# Patient Record
Sex: FEMALE | NOT HISPANIC OR LATINO
[De-identification: names, ages, dates, MRNs, and addresses within clinical notes are randomized per-mention and may not be internally consistent; named-entity substitution may affect disease eponyms.]

---

## 2017-07-22 ENCOUNTER — HEALTH MAINTENANCE LETTER (OUTPATIENT)
Age: 32
End: 2017-07-22

## 2024-01-22 ENCOUNTER — DOCUMENTATION ONLY (OUTPATIENT)
Dept: FAMILY MEDICINE | Facility: CLINIC | Age: 39
End: 2024-01-22

## 2024-01-25 ENCOUNTER — OFFICE VISIT (OUTPATIENT)
Dept: FAMILY MEDICINE | Facility: CLINIC | Age: 39
End: 2024-01-25
Payer: COMMERCIAL

## 2024-01-25 VITALS
HEIGHT: 65 IN | DIASTOLIC BLOOD PRESSURE: 70 MMHG | SYSTOLIC BLOOD PRESSURE: 120 MMHG | WEIGHT: 135 LBS | HEART RATE: 85 BPM | OXYGEN SATURATION: 100 % | BODY MASS INDEX: 22.49 KG/M2 | RESPIRATION RATE: 20 BRPM

## 2024-01-25 DIAGNOSIS — N89.8 VAGINAL DISCHARGE: ICD-10-CM

## 2024-01-25 DIAGNOSIS — Z13.9 SCREENING FOR CONDITION: ICD-10-CM

## 2024-01-25 DIAGNOSIS — Z32.00 ENCOUNTER FOR CONFIRMATION OF PREGNANCY TEST RESULT WITH PHYSICAL EXAMINATION: Primary | ICD-10-CM

## 2024-01-25 LAB
CLUE CELLS: ABNORMAL
HCG UR QL: POSITIVE
TRICHOMONAS, WET PREP: ABNORMAL
WBC'S/HIGH POWER FIELD, WET PREP: ABNORMAL
YEAST, WET PREP: ABNORMAL

## 2024-01-25 PROCEDURE — 81025 URINE PREGNANCY TEST: CPT

## 2024-01-25 PROCEDURE — 87210 SMEAR WET MOUNT SALINE/INK: CPT

## 2024-01-25 PROCEDURE — 99203 OFFICE O/P NEW LOW 30 MIN: CPT | Mod: GC

## 2024-01-25 RX ORDER — PYRIDOXINE HCL (VITAMIN B6) 25 MG
25 TABLET ORAL 4 TIMES DAILY PRN
Qty: 30 TABLET | Refills: 3 | Status: SHIPPED | OUTPATIENT
Start: 2024-01-25 | End: 2024-07-15

## 2024-01-25 RX ORDER — PRENATAL VIT/IRON FUM/FOLIC AC 27MG-0.8MG
1 TABLET ORAL DAILY
Qty: 90 TABLET | Refills: 3 | Status: SHIPPED | OUTPATIENT
Start: 2024-01-25 | End: 2024-07-15

## 2024-01-25 NOTE — PROGRESS NOTES
Assessment & Plan     (Z32.00) Encounter for confirmation of pregnancy test result with physical examination  (primary encounter diagnosis)  Comment: Pregnancy test positive, desires pregnancy. Father known and wishes to be involved.  with one spontaneous  in 2nd gestation. First baby vaginal, then twins born via horizontal c section at 32 weeks who stayed in the NICU for a month, and then an uncomplicated . LMP about 12/10/24. Gestation about 6.5 weeks. Will get 8 week dating US. Orders provided for prenatal vitamin and vitamin B6  Plan:   - HCG qualitative urine,  - Prenatal Vit-Fe Fumarate-FA (PRENATAL MULTIVITAMIN W/IRON) 27-0.8 MG tablet,   - US OB <14 WKS SINGLE OR FIRST GESTATION,   - pyridOXINE (VITAMIN B6) 25 MG tablet            (N89.8) Vaginal discharge  Comment: Patient with a couple weeks of stable white discharge. Not painful, irritating, malodorous, or itchy. Not changing over this time period. Wet prep negative.   Plan:    - Wet prep negative   - Continue to monitor   - Follow up if it becomes more bothersome or painful       No follow-ups on file.    Sharon Ricardo is a 38 year old, presenting for the following health issues:  Establish Care (And ob care ), STD (Discharge ), Patient Request for Note/Letter (Lft work early for appt), and OTHER (Work restricted )        2024     3:55 PM   Additional Questions   Roomed by torres CARRERO     Lives nearby. Work as  at [employer]. Living with twins 13 and son at 10. 18 year old as well lives with father. Cat litter.     3 positive tests started like 10 days ago.     LMP: around 12/10/23. Male partner and interested in caring for daughter.     Prenatal multivitamin. No regular medications.     Vaginal, twin , vaginal. Twin twins premature - 32 weeks. NICU for about a month. 1 misscarriage.     Lots of nausea throughout preganncy until 6 months.         Work note for leaving work at 1 PM today    White  "discharge - Couple weeks, stable, not itching or irritating. No pain with urination ro sex.         Review of Systems  Constitutional, HEENT, cardiovascular, pulmonary, gi and gu systems are negative, except as otherwise noted.      Objective    /70   Pulse 85   Resp 20   Ht 1.651 m (5' 5\")   Wt 61.2 kg (135 lb)   LMP 12/18/2023 (Approximate)   SpO2 100%   BMI 22.47 kg/m    Body mass index is 22.47 kg/m .  Physical Exam   GENERAL: Healthy, alert and no distress  EYES: Eyes grossly normal to inspection.  No discharge or erythema, or obvious scleral/conjunctival abnormalities.  RESP:  Lungs clear throughout. No wheeze or crackles.   CV: Heart RRR. No murmur  NEURO: Cranial nerves grossly intact.  Mentation and speech appropriate for age.  PSYCH: Mentation appears normal, affect normal/bright, judgement and insight intact, normal speech and appearance well-groomed.      Positive pregnancy. Negative wet prep.         Signed Electronically by: Santos Herndon MD    "

## 2024-01-29 NOTE — PROGRESS NOTES
Faculty Supervision of Residents   I have examined this patient and the medical care has been evaluated and discussed with the resident. See resident note outlining our discussion.    Denise Epstein MD

## 2024-02-02 NOTE — PROGRESS NOTES
OB intake completed via phone on 2024 with patient. Haleigh is a 39 yo, American female, , with history of one miscarriage, first trimester, in . All pregnancies were complicated by hyperemesis throughout most of pregnancy. First and third pregnancies were delivered via normal, spontaneous, vaginal deliveries. Second pregnancy was twin gestation, delivered via  at 32 weeks gestation, went into active labor.     Current pregnancy was unplanned, desired by only Haleigh. FOB is not in agreement to keep the pregnancy and is no longer in a relationship with patient. LMP reported as 2023. Haleigh will plan to request a paternity test be done once baby is born. Patient endorses use of THC, last use 2-3 weeks ago, social use. Lyubov EL

## 2024-02-08 ENCOUNTER — ANCILLARY PROCEDURE (OUTPATIENT)
Dept: ULTRASOUND IMAGING | Facility: CLINIC | Age: 39
End: 2024-02-08
Payer: COMMERCIAL

## 2024-02-08 ENCOUNTER — OFFICE VISIT (OUTPATIENT)
Dept: FAMILY MEDICINE | Facility: CLINIC | Age: 39
End: 2024-02-08
Payer: COMMERCIAL

## 2024-02-08 VITALS
BODY MASS INDEX: 23.17 KG/M2 | OXYGEN SATURATION: 99 % | RESPIRATION RATE: 20 BRPM | TEMPERATURE: 99.1 F | SYSTOLIC BLOOD PRESSURE: 106 MMHG | HEART RATE: 70 BPM | HEIGHT: 64 IN | DIASTOLIC BLOOD PRESSURE: 68 MMHG

## 2024-02-08 DIAGNOSIS — Z30.09 ENCOUNTER FOR COUNSELING REGARDING CONTRACEPTION: ICD-10-CM

## 2024-02-08 DIAGNOSIS — Z13.9 SCREENING FOR CONDITION: ICD-10-CM

## 2024-02-08 DIAGNOSIS — O20.0 THREATENED MISCARRIAGE IN EARLY PREGNANCY: Primary | ICD-10-CM

## 2024-02-08 PROCEDURE — 76817 TRANSVAGINAL US OBSTETRIC: CPT | Mod: TC | Performed by: RADIOLOGY

## 2024-02-08 PROCEDURE — 99214 OFFICE O/P EST MOD 30 MIN: CPT | Performed by: FAMILY MEDICINE

## 2024-02-08 PROCEDURE — 76801 OB US < 14 WKS SINGLE FETUS: CPT | Mod: TC | Performed by: RADIOLOGY

## 2024-02-08 NOTE — PROGRESS NOTES
"  Assessment & Plan       Threatened miscarriage in early pregnancy  Discussed most likely SAB in process given the higher risk sexual encounter matches later dates and aren't consistent with today's ultrasound as well as that it would be difficult for her ot have had + pregnancy test 1/16 after intercourse 1/10 and she does not usually miss menses. She is likely not planning to continue the pregnancy either way, but wants to know if it is a viable pregnancy for sure and the dating prior to decision. We discussed serial HCGs, but she preferred to get a repeat US next week. She would like to restart depo when this is done. Follow-up in 2 weeks with provider.  - US OB <14 Weeks w Transvaginal Single; Future      I spent a total of 35 minutes on the day of the visit.   Time spent by me doing chart review, history and exam, documentation and further activities per the note            No follow-ups on file.    Sharon Ricardo is a 38 year old, presenting for the following health issues:  Follow Up (U/S measurement)    HPI   Presents for concern for miscarriage. Had first dating US today. By estimated LMP would be 8-9+ weeks gestation. US had gestational sac and no cardiac activity.     LMP was at the beginning of December sometime.    Last time had unprotected intercourse was before Christmas, positive pregnancy test on Jan 16 after her period was a couple weeks late. She did also have sex with a different partner using a condom Travis 10. Typically her cycle is very regular with menses at the beginning of each month. She had one day of nausea a few days ago, some breast tenderness. No bleeding or cramping.                   Objective    /68   Pulse 70   Temp 99.1  F (37.3  C)   Resp 20   Ht 1.626 m (5' 4\")   LMP 12/18/2023 (Approximate)   SpO2 99%   BMI 23.17 kg/m    Body mass index is 23.17 kg/m .  Physical Exam               Signed Electronically by: Jaz Manzo MD    "

## 2024-02-22 ENCOUNTER — OFFICE VISIT (OUTPATIENT)
Dept: FAMILY MEDICINE | Facility: CLINIC | Age: 39
End: 2024-02-22
Payer: COMMERCIAL

## 2024-02-22 VITALS
OXYGEN SATURATION: 95 % | TEMPERATURE: 99.3 F | BODY MASS INDEX: 22.66 KG/M2 | HEART RATE: 93 BPM | WEIGHT: 136 LBS | DIASTOLIC BLOOD PRESSURE: 72 MMHG | SYSTOLIC BLOOD PRESSURE: 134 MMHG | HEIGHT: 65 IN

## 2024-02-22 DIAGNOSIS — Z12.4 CERVICAL CANCER SCREENING: ICD-10-CM

## 2024-02-22 DIAGNOSIS — O02.1 MISSED ABORTION: Primary | ICD-10-CM

## 2024-02-22 DIAGNOSIS — Z11.4 SCREENING FOR HIV (HUMAN IMMUNODEFICIENCY VIRUS): ICD-10-CM

## 2024-02-22 DIAGNOSIS — Z11.59 NEED FOR HEPATITIS C SCREENING TEST: ICD-10-CM

## 2024-02-22 PROCEDURE — S0190 MIFEPRISTONE, ORAL, 200 MG: HCPCS | Performed by: FAMILY MEDICINE

## 2024-02-22 PROCEDURE — 99214 OFFICE O/P EST MOD 30 MIN: CPT | Performed by: FAMILY MEDICINE

## 2024-02-22 RX ORDER — MISOPROSTOL 200 UG/1
800 TABLET ORAL ONCE
Qty: 4 TABLET | Refills: 1 | Status: CANCELLED | OUTPATIENT
Start: 2024-02-22 | End: 2024-02-22

## 2024-02-22 RX ORDER — MIFEPRISTONE 200 MG/1
200 TABLET ORAL ONCE
Status: COMPLETED | OUTPATIENT
Start: 2024-02-22 | End: 2024-02-22

## 2024-02-22 RX ORDER — ONDANSETRON 4 MG/1
4 TABLET, FILM COATED ORAL EVERY 6 HOURS PRN
Qty: 10 TABLET | Refills: 0 | Status: SHIPPED | OUTPATIENT
Start: 2024-02-22 | End: 2024-02-22 | Stop reason: SINTOL

## 2024-02-22 RX ORDER — IBUPROFEN 800 MG/1
800 TABLET, FILM COATED ORAL EVERY 8 HOURS PRN
Qty: 21 TABLET | Refills: 0 | Status: SHIPPED | OUTPATIENT
Start: 2024-02-22 | End: 2024-02-23

## 2024-02-22 RX ORDER — IBUPROFEN 800 MG/1
800 TABLET, FILM COATED ORAL EVERY 8 HOURS PRN
Qty: 21 TABLET | Refills: 0 | Status: CANCELLED | OUTPATIENT
Start: 2024-02-22

## 2024-02-22 RX ORDER — MISOPROSTOL 200 UG/1
800 TABLET ORAL ONCE
Qty: 4 TABLET | Refills: 1 | Status: SHIPPED | OUTPATIENT
Start: 2024-02-22 | End: 2024-02-22

## 2024-02-22 RX ADMIN — MIFEPRISTONE 200 MG: 200 TABLET ORAL at 16:06

## 2024-02-22 NOTE — PATIENT INSTRUCTIONS
MISCARRIAGE MANAGEMENT USING MEDICATIONS     HOW DOES IT WORK?   Pills called mifepristone and misoprostol can help an early pregnancy loss to end faster.     HOW WELL DOES THE MEDICATION WORK?   These pills work 84% of the time to complete the miscarriage within two days and 89% of the time within a week. If you can t get mifepristone, misoprostol works 67% of the time within two days and 84% of the time within a week when used alone.     IS IT SAFE?   Yes, pills for pregnancy loss are safe. Using pills does not lower your chance of getting pregnant again.     WHAT DO I DO?   You took one tablet of 200 mg mifepristone today during your visit.  About 24 hours after taking the mifepristone, use the misoprostol at home. Choose a time when you have had a good meal and plenty of rest.   Swallow ibuprofen (600 mg total) one hour before using the misoprostol. This will help with side effects.   Vaginal Use of Misoprostol  Wash your hands and lie down. Push the four misoprostol tablets one at a time up into the vagina as far as you can using your finger. It doesn t matter where in the vagina you put the pills. Each misoprostol pill contains 200 micrograms.   Keep lying down for 30 minutes.   After 30 minutes, you can move around as usual. If the tablets come out after 30 minutes, it is OK. Your body has absorbed the medication.        Buccal Use of Misoprostol  Place 2 tablets of misoprostol in each cheek (between your cheek and your gums)  Let these tablets dissolve, whatever has not dissolved within 30 minutes can be swallowed with a large glass of water  If you do not start to bleed within 24 hours give yourself a second dose of misoprostol. Do this by repeating step 4 or 5 above.     WHAT HAPPENS NEXT?  Cramps and bleeding with clots are an expected part of this process. The cramps and bleeding may be stronger than your normal period. The cramps usually start 2 to 4 hours after you use the misoprostol pills and may  last 3 to 5 hours. You may start to have vaginal bleeding before you use the pills I have given you. This heavy bleeding is not risky. It means the pills are working. The bleeding often lasts 1 to 2 weeks, and may stop and start a few times.   Nausea, diarrhea, or chills: These symptoms should get better a few hours after using the pills and should not last longer than 24 hours.   You need to have an appointment in the clinic in 1-2 weeks. This was made for you today. At this visit we will ensure that the treatment was effective. If it was not effective, we will talk about next steps.        WHAT CAN I TAKE FOR PAIN, NAUSEA, DIARRHEA?   Pain:   Take ibuprofen (Motrin or Advil) 800 mg every 8 hours as needed for pain. Take this with food to avoid an upset stomach.   You may receive a stronger, narcotic pain medicine. You can use this if you still have pain after taking ibuprofen. Follow the directions on the label.   Comfort: A hot pack may help with cramps. You can also drink some hot tea. Rest in a soothing place.   Nausea  Take ondansetron or promethazine as needed for nausea and vomiting as needed for nausea and vomiting.   Diarrhea  Take Loperamide as needed for diarrhea. Take 2 capsules after the first loose bowel movement. Can take 1 capsule after each additional loose stool. Do not take more than 8 capsules in a day.    WHAT IF IT TAKES TOO LONG OR DOESN T WORK?   If it doesn t work or you feel it is taking too long, please call the clinic and we can discuss the next steps.    WHEN SHOULD I CALL OR RETURN TO MY HEALTH CARE PROVIDER?   Your bleeding soaks through more than 2 maxi pads per hour for 2 hours in a row.  You have a fever on an oral thermometer of 100.4 degrees Fahrenheit   You have severe stomach area (abdominal) pain   You continue to feel sick 24 hours after taking the misoprostol, this includes stomach pain or discomfort, weakness, nausea, vomiting, or diarrhea    The clinic phone is answered 24  hours per day. If it is after clinic hours, leave a message with the answering service and a physician will call you back. Please call if you have any questions, think something is going wrong, or think you have an emergency. No question is too small. If you do not receive a call back within an hour, go to the nearest emergency room.     HOW SOON CAN I GET PREGNANT AGAIN?   You can get pregnant soon after the pregnancy loss ends. If you want to try again, take a prenatal vitamin daily. If you are not ready to be pregnant at this time, please see your clinician for your birth control options.     FEELINGS AFTER EARLY PREGNANCY LOSS   Most women feel mixed emotions after a miscarriage. It often depends upon how much time you had to adjust to the news of the pregnancy loss. These up and down feelings are partly from the changes in hormones. Please understand that nothing you did caused the pregnancy loss. It is NOT caused by stress, sports, food, or sex. Feeling emotional at this time is normal. If you think your emotions are not what they should be, please talk to us.

## 2024-02-22 NOTE — PROGRESS NOTES
"  Confirmed Early Pregnancy Loss - Outpatient Visit    Subjective:  Haleigh Caraballo is a 38 year old female coming today for management of likely early pregnancy loss. She has noticed the following:  - loss of pregnancy symptoms  - lower abdominal pain but no bleeding  Patient diagnosed with early pregnancy loss at the following time/location: [facility] earlier today confirmed via ultrasound, with fetal pole and no cardiac activity that is unchanged since 2/9/24    ROS: All other review of symptoms was otherwise negative except as noted in HPI    Dating:  LMP: Patient's last menstrual period was 12/18/2023 (approximate).  Ultrasound results:   vaginal  Intrauterine gestational sac seen: no  Gestational sac summary: fetal pole seen, CRL: 0.5 cm, EGA: 6 weeks + 2 days  Fetal/Embryonic cardiac activity: absent  Adnexa: Not evaluated    Serum beta hCG: was assessed x 1 2/9 and was consistent with presumed GA at that time  Hemoglobin: NA  RH Status (if >8wks GA): Not required      Objective:  /72   Pulse 93   Temp 99.3  F (37.4  C) (Tympanic)   Ht 1.648 m (5' 4.88\")   Wt 61.7 kg (136 lb)   LMP 12/18/2023 (Approximate)   SpO2 95%   Breastfeeding Unknown   BMI 22.71 kg/m    Vitals were reviewed and were normal    Gen: well-appearing, cooperative, well-groomed      Assessment and Plan:  First-trimester pregnancy loss - this based on meeting [place] ultrasound criteria     We reviewed the patient's options at length including the risks and benefits or expectant management, medical management using misoprostol alone, mifepristone and misoprostol, and aspiration procedure with local anesthesia and suction evacuation. Patient IS vitally stable and IS appropriate to manage as an outpatient.    The patient has elected and is appropriate for medical management with mifepristone and misoprostol.     Prior to the administration/prescribing of mifepristone, the patient received the medication guide and " signed the patient agreement.      Mifepristone administered in clinic. Patient plans to take misoprostol by buccal route. A refill was provided of misoprostol as patient has had minimal symptoms.    Medication Management  - confirmed patient is within gestational age limit (11w6d or less)  - obtain serum hCG (if applicable)  - obtain hemoglobin and type and screen (if patent is >8wk GA and if not already known, or if not performed within the window of onset of bleeding or diagnosis of EPL)  - reviewed expected and serious side effects from mifepristone  - reviewed how to take misoprostol and expected side effects  - reviewed that mifepristone/misoprostol may not be effective and patient may require surgical management of miscarriage.   - confirmed and addressed any drug-drug interactions with patient's concurrent medicines, including vitamins and herbal supplements  - provided prescription of ibuprofen for cramping, loperamide for diarrhea and a medicine for nausea  - call or return to clinic in 1-2 weeks if no bleeding or obvious passage of POC's has occurred  - chart routed to RN team who will follow up with patient via telephone in 1-2 days (or next business day) after clinic visit to assess patient     33 minutes spent on day of visit reviewing the chart, in documentation, and face to face counseling and evaluation with the patient.     Jaz Manzo MD

## 2024-02-22 NOTE — LETTER
RETURN TO WORK/SCHOOL FORM    2/22/2024    Re: Haleigh Caraballo  1985      To Whom It May Concern:     Haleigh Caraballo was seen in clinic today.  She may return to work without restrictions on 2/28/24.  Please excuse her from work until that time.             Jaz Manzo MD  2/22/2024 3:18 PM

## 2024-02-23 RX ORDER — IBUPROFEN 800 MG/1
800 TABLET, FILM COATED ORAL EVERY 8 HOURS PRN
Qty: 21 TABLET | Refills: 1 | Status: SHIPPED | OUTPATIENT
Start: 2024-02-23

## 2024-02-23 RX ORDER — ONDANSETRON 4 MG/1
4 TABLET, FILM COATED ORAL EVERY 6 HOURS PRN
Qty: 10 TABLET | Refills: 0 | Status: SHIPPED | OUTPATIENT
Start: 2024-02-23 | End: 2024-03-07

## 2024-02-23 RX ORDER — ONDANSETRON 4 MG/1
4 TABLET, FILM COATED ORAL EVERY 6 HOURS PRN
Qty: 10 TABLET | Refills: 0 | OUTPATIENT
Start: 2024-02-23 | End: 2024-08-08

## 2024-02-26 DIAGNOSIS — N89.8 VAGINAL DISCHARGE: Primary | ICD-10-CM

## 2024-02-26 RX ORDER — METRONIDAZOLE 7.5 MG/G
1 GEL VAGINAL DAILY
Qty: 35 G | Refills: 0 | Status: SHIPPED | OUTPATIENT
Start: 2024-02-26 | End: 2024-03-04

## 2024-02-26 NOTE — PROGRESS NOTES
BV diagnosed in hospital, desired vaginal gel treatment and sent for post SAB if needed.    Jaz Manzo MD on 2/26/2024 at 12:28 PM

## 2024-02-28 ENCOUNTER — TELEPHONE (OUTPATIENT)
Dept: FAMILY MEDICINE | Facility: CLINIC | Age: 39
End: 2024-02-28
Payer: COMMERCIAL

## 2024-02-28 NOTE — TELEPHONE ENCOUNTER
Writer did not receive note to follow up with patient, chart not routed to writer. Writer performing follow up on OB panel of patients,  had noticed patient was in on 2/22/2024. After review of office visit notes, writer called patient for follow up.  Per Haleigh, given one tablet of Mifeprisone 200mg while in clinic, went to pharmacy and picked up Misoprostol 200mg tablets x 4 tablets. Administered Misoprostol as prescribed. Following day, Friday, 2/23/2024 around 6pm, had minor abdominal cramping then noticed what felt like a large amount of gush from vagina while was on the toilet. Haleigh looked in toilet but was not able to identify if there were blood clots, tissue or products of conception.  Thereafter had normal, vaginal bleeding similar to menses cycle. Denies any further abdominal pain/cramping, no back pain or fever. Otherwise feel ok. Today, is at work, only experiencing light spotting but still using regular pad.  Per Dr Tompkins's recommendation for follow up in 1-2 weeks, was able to schedule with patient a follow up appointment for 3/7/2024 with Dr Gonzalez. Dr Tompkins was not available, Haleigh is fine with seeing another provider. Encounter routed to inform Dr Tompkins of the updated information above. Thank you. Lyubov EL

## 2024-03-07 ENCOUNTER — OFFICE VISIT (OUTPATIENT)
Dept: FAMILY MEDICINE | Facility: CLINIC | Age: 39
End: 2024-03-07
Payer: COMMERCIAL

## 2024-03-07 VITALS
OXYGEN SATURATION: 100 % | RESPIRATION RATE: 18 BRPM | BODY MASS INDEX: 22.2 KG/M2 | HEART RATE: 82 BPM | SYSTOLIC BLOOD PRESSURE: 111 MMHG | TEMPERATURE: 98.5 F | HEIGHT: 66 IN | DIASTOLIC BLOOD PRESSURE: 74 MMHG | WEIGHT: 138.12 LBS

## 2024-03-07 DIAGNOSIS — S39.94XA INJURY OF VULVA, INITIAL ENCOUNTER: ICD-10-CM

## 2024-03-07 DIAGNOSIS — O02.1 MISSED ABORTION: Primary | ICD-10-CM

## 2024-03-07 PROBLEM — H52.13 MYOPIC ASTIGMATISM OF BOTH EYES: Status: ACTIVE | Noted: 2019-03-11

## 2024-03-07 PROBLEM — A59.9 TRICHOMONIASIS: Status: ACTIVE | Noted: 2023-05-18

## 2024-03-07 PROBLEM — H52.203 MYOPIC ASTIGMATISM OF BOTH EYES: Status: ACTIVE | Noted: 2019-03-11

## 2024-03-07 PROCEDURE — 36415 COLL VENOUS BLD VENIPUNCTURE: CPT

## 2024-03-07 PROCEDURE — 99214 OFFICE O/P EST MOD 30 MIN: CPT | Mod: GC

## 2024-03-07 PROCEDURE — 84702 CHORIONIC GONADOTROPIN TEST: CPT

## 2024-03-07 RX ORDER — MISOPROSTOL 200 UG/1
TABLET ORAL
COMMUNITY
Start: 2024-02-23 | End: 2024-07-15

## 2024-03-07 NOTE — PROGRESS NOTES
Preceptor Attestation:  Patient's case reviewed and discussed with Nancy Gonzalez MD resident and I evaluated the patient. I agree with written assessment and plan of care.  Supervising Physician:  MICHAEL SYKES MD  [facility]

## 2024-03-07 NOTE — PROGRESS NOTES
"  Assessment & Plan     Missed   Treated for spontaneous miscarriage on . Passed clots and tissue two days afterwards. Mild bleeding for about a week after but this has now subsided. Will get a hcg quant to assure complete clearance of tissue.   - hCG Quantitative Pregnancy; Future  - hCG Quantitative Pregnancy    Injury of vulva, initial encounter  Left labial injury from shaving. Two small <1 cm lacerations in process of healing. No other lesions noted. Discussed likely healing in the next week. If not improving within the next week will return for further evaluation.       Sharon Ricardo is a 38 year old, presenting for the following health issues:  RECHECK (F/u )    HPI   Seen on  and diagnosed with first trimester pregnancy loss and prescribed mifepristone/misoprostol. Was called by our RN team and patient believed she passed clots on . Hadn't had any bleeding since that time.  with one spontaneous  in 2nd gestation. First baby vaginal, then twins born via horizontal c section at 32 weeks who stayed in the NICU for a month, and then an uncomplicated .     Since that time:  Bleeding or cramping: minor bleeding from cut but no period bleeding or cramping  Desire for more children: yes  Birth control: not interested    Bump on vagina -- shaving. Last week. Never had it before. Painful to touch. No fevers or chills. Wonders about razor bump or injury. Currently sexually active.           Objective    /74 (BP Location: Right arm, Patient Position: Sitting, Cuff Size: Adult Regular)   Pulse 82   Temp 98.5  F (36.9  C)   Resp 18   Ht 1.665 m (5' 5.55\")   Wt 62.7 kg (138 lb 1.9 oz)   LMP 2023 (Approximate)   SpO2 100%   BMI 22.60 kg/m    Body mass index is 22.6 kg/m .  Physical Exam     GENERAL: Healthy, alert and no distress  EYES: Eyes grossly normal to inspection.  No discharge or erythema, or obvious scleral/conjunctival abnormalities.  HENT: Normal " cephalic/atraumatic.  External ears, nose and mouth without ulcers or lesions.  No nasal drainage visible.  RESP: No audible wheeze, cough, or visible cyanosis.  No visible retractions or increased work of breathing.    MS: No gross musculoskeletal defects noted.  Normal range of motion.  No visible edema.  : two small lacerations to left lower labia in process of healing. No active bleeding. No other bumps or lesions noted.   NEURO: Cranial nerves grossly intact.  Mentation and speech appropriate for age.  PSYCH: Mentation appears normal, affect normal/bright, judgement and insight intact, normal speech and appearance well-groomed.            Signed Electronically by: Nancy Gonzalez MD

## 2024-03-08 LAB — HCG INTACT+B SERPL-ACNC: 146 MIU/ML

## 2024-05-20 ENCOUNTER — OFFICE VISIT (OUTPATIENT)
Dept: FAMILY MEDICINE | Facility: CLINIC | Age: 39
End: 2024-05-20
Payer: COMMERCIAL

## 2024-05-20 VITALS
TEMPERATURE: 98 F | BODY MASS INDEX: 21.83 KG/M2 | WEIGHT: 131 LBS | OXYGEN SATURATION: 98 % | RESPIRATION RATE: 18 BRPM | HEART RATE: 71 BPM | HEIGHT: 65 IN | SYSTOLIC BLOOD PRESSURE: 110 MMHG | DIASTOLIC BLOOD PRESSURE: 75 MMHG

## 2024-05-20 DIAGNOSIS — N89.8 VAGINAL DISCHARGE: Primary | ICD-10-CM

## 2024-05-20 LAB
CLUE CELLS: ABNORMAL
TRICHOMONAS, WET PREP: ABNORMAL
WBC'S/HIGH POWER FIELD, WET PREP: ABNORMAL
YEAST, WET PREP: ABNORMAL

## 2024-05-20 PROCEDURE — 87491 CHLMYD TRACH DNA AMP PROBE: CPT

## 2024-05-20 PROCEDURE — 87210 SMEAR WET MOUNT SALINE/INK: CPT

## 2024-05-20 PROCEDURE — 87591 N.GONORRHOEAE DNA AMP PROB: CPT

## 2024-05-20 PROCEDURE — 99213 OFFICE O/P EST LOW 20 MIN: CPT | Mod: GC

## 2024-05-20 NOTE — PROGRESS NOTES
Preceptor Attestation:  Patient's case reviewed and discussed with the resident, Elizabeth Montoya MD, and I personally evaluated the patient. I agree with written assessment and plan of care.    Supervising Physician:  Cheyenne Soria MD   [facility]

## 2024-05-20 NOTE — PROGRESS NOTES
"  Assessment & Plan     Vaginal discharge  Patient has had a few episodes of white thick vaginal discharge in the past two weeks, now resolved. No other symptoms. No new sexual partners. Wet prep negative. G/C in process. Likely physiologic discharge.   - Wet prep  - Gonorrhea/chlamydia     No follow-ups on file.    Sharon Ricardo is a 38 year old, presenting for the following health issues:  Vaginal Problem (Discharge is not concerned about other STD's)        5/20/2024     3:36 PM   Additional Questions   Roomed by Ety   Accompanied by Self         5/20/2024    Information    services provided? No     HPI     White vaginal discharge (thick, creamy) a couple times in the last 2 weeks.   No itchiness, pain, burning.   No pain with intercourse.   No fevers, chills.   No dysuria, abdominal pain.   Same sexual partner for the past several months.     Not on birth control \"because it doesn't work for me.\"   Was on Depo and patch but always had spotting.   Had IUD but took out due to pain/cramping.       Objective    /75 (BP Location: Right arm, Patient Position: Sitting, Cuff Size: Adult Regular)   Pulse 71   Temp 98  F (36.7  C) (Oral)   Resp 18   Ht 1.638 m (5' 4.5\")   Wt 59.4 kg (131 lb)   SpO2 98%   BMI 22.14 kg/m    Body mass index is 22.14 kg/m .  Physical Exam   GENERAL: alert and no distress  NECK: no adenopathy, no asymmetry, masses, or scars  RESP: lungs clear to auscultation - no rales, rhonchi or wheezes  CV: regular rate and rhythm, normal S1 S2, no S3 or S4, no murmur, click or rub, no peripheral edema  ABDOMEN: soft, nontender, no hepatosplenomegaly, no masses and bowel sounds normal  MS: no gross musculoskeletal defects noted, no edema        Signed Electronically by: Elizabeth Montoya MD    "

## 2024-05-21 LAB
C TRACH DNA SPEC QL PROBE+SIG AMP: NEGATIVE
N GONORRHOEA DNA SPEC QL NAA+PROBE: NEGATIVE

## 2024-07-15 ENCOUNTER — OFFICE VISIT (OUTPATIENT)
Dept: FAMILY MEDICINE | Facility: CLINIC | Age: 39
End: 2024-07-15
Payer: COMMERCIAL

## 2024-07-15 VITALS
DIASTOLIC BLOOD PRESSURE: 63 MMHG | SYSTOLIC BLOOD PRESSURE: 101 MMHG | TEMPERATURE: 98.9 F | OXYGEN SATURATION: 100 % | HEIGHT: 65 IN | WEIGHT: 131 LBS | HEART RATE: 76 BPM | BODY MASS INDEX: 21.83 KG/M2 | RESPIRATION RATE: 18 BRPM

## 2024-07-15 DIAGNOSIS — O02.1 MISSED ABORTION: ICD-10-CM

## 2024-07-15 DIAGNOSIS — Z13.9 SCREENING FOR CONDITION: ICD-10-CM

## 2024-07-15 DIAGNOSIS — Z32.00 ENCOUNTER FOR CONFIRMATION OF PREGNANCY TEST RESULT WITH PHYSICAL EXAMINATION: Primary | ICD-10-CM

## 2024-07-15 PROCEDURE — 84702 CHORIONIC GONADOTROPIN TEST: CPT

## 2024-07-15 PROCEDURE — 99214 OFFICE O/P EST MOD 30 MIN: CPT | Mod: GC

## 2024-07-15 PROCEDURE — 36415 COLL VENOUS BLD VENIPUNCTURE: CPT

## 2024-07-15 RX ORDER — PRENATAL VIT/IRON FUM/FOLIC AC 27MG-0.8MG
1 TABLET ORAL DAILY
Qty: 90 TABLET | Refills: 3 | Status: SHIPPED | OUTPATIENT
Start: 2024-07-15

## 2024-07-15 RX ORDER — ONDANSETRON 4 MG/1
4 TABLET, FILM COATED ORAL EVERY 8 HOURS PRN
Qty: 10 TABLET | Refills: 0 | Status: SHIPPED | OUTPATIENT
Start: 2024-07-15

## 2024-07-15 RX ORDER — PYRIDOXINE HCL (VITAMIN B6) 25 MG
25 TABLET ORAL 4 TIMES DAILY PRN
Qty: 30 TABLET | Refills: 3 | Status: SHIPPED | OUTPATIENT
Start: 2024-07-15

## 2024-07-15 NOTE — PROGRESS NOTES
"  Assessment & Plan     (Z32.00) Encounter for confirmation of pregnancy test result with physical examination  (primary encounter diagnosis)  Comment:  mom here for ED follow up and confirmation of pregnancy. Had HCG of 5k 4 days ago in ED. LMP 6/3/24, EDC 24, YAMILE 3/10/25. Patient has 4 boys, 18, twins at 14, and 10. Has had 2 1st trimester miscarriages.  Patient not currently having any symptoms of abdominal pain, chest pain, difficulty breathing, nausea or vomiting.  In the past she has struggled with first trimester nausea and would like to have something on hand for that.  This is a desired pregnancy.  Father is known but does not live with Haleigh.  Father is the same father is with her other 4 boys.  Will start her on a prenatal multivitamin, hCG quant, dating ultrasound, routine visit at 12 weeks.  Plan:   - hCG Quantitative Pregnancy,   - ondansetron (ZOFRAN) 4 MG tablet,   - US OB < 14 weeks, single, for dating ([id number])   -vitamin b6 for nausea up to 4 times daily     -will have RN team call to schedule 12 week initial OB visit      No follow-ups on file.    Subjective   Haleigh is a 39 year old, presenting for the following health issues:  RECHECK (Follow-up, from emergency dept.) and Pregnancy Test (positive)      7/15/2024     8:09 AM   Additional Questions   Roomed by Royce   Accompanied by self         7/15/2024    Information    services provided? No        HPI         LMP: 6/3/23    3/10/2025  2024    4 boys - 18, twins 14, 10.  3 boys at home -   Father known-      6w0d.     No abodminal pain or cramping. No nausea. No chest pain or difficulty breathing.           Objective    /63 (BP Location: Right arm, Patient Position: Sitting)   Pulse 76   Temp 98.9  F (37.2  C) (Oral)   Resp 18   Ht 1.65 m (5' 4.96\")   Wt 59.4 kg (131 lb)   LMP 2023 (Approximate)   SpO2 100%   BMI 21.83 kg/m    Body mass index is 21.83 kg/m .  Physical Exam "   GENERAL: Healthy, alert and no distress  EYES: Eyes grossly normal to inspection.  No discharge or erythema, or obvious scleral/conjunctival abnormalities.  RESP:  Lungs clear throughout. No wheeze or crackles.   CV: Heart RRR. No murmur  Abdomen: Soft, nontender, nondistended.  MSK: No gross deformity. Normal tone.  SKIN: Visible skin clear. No significant rash, abnormal pigmentation or lesions.  NEURO: Cranial nerves grossly intact.  Mentation and speech appropriate for age.  PSYCH: Mentation appears normal, affect normal/bright, judgement and insight intact, normal speech and appearance well-groomed.      No results found for this or any previous visit (from the past 24 hour(s)).        Signed Electronically by: Santos Herndon MD

## 2024-07-15 NOTE — PROGRESS NOTES
Preceptor Attestation:  Patient's case reviewed and discussed with the resident, Santos Herndon MD, and I personally evaluated the patient. I agree with written assessment and plan of care.    Supervising Physician:  Cheyenne Soria MD   [facility]

## 2024-07-16 ENCOUNTER — TELEPHONE (OUTPATIENT)
Dept: FAMILY MEDICINE | Facility: CLINIC | Age: 39
End: 2024-07-16
Payer: COMMERCIAL

## 2024-07-16 LAB — HCG INTACT+B SERPL-ACNC: ABNORMAL MIU/ML

## 2024-07-16 NOTE — TELEPHONE ENCOUNTER
Writer called, no answer, left message for Haleigh to call me back. Need to completed OB intake and schedule NOB with Dr Herndon or Dr Otoole. Thank you. Lyubov EL

## 2024-07-16 NOTE — TELEPHONE ENCOUNTER
Haleigh walked into clinic, beta quant results reviewed with patient. OB intake completed while patient was present in clinic.    Name: Haleigh  Age: 39  : , first pregnancy- full term,,  second preg- twin gestation, premature rupture of membranes at 32 weeks gestation, third preg- full term, , one miscarriage 2024, no .  Preferred language: English,  female  Birthplace: [place]  Level of education: 12th   status: single, has been FOB/Boyfriend x 1 year, diff FOB of other four children. 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    FOB name: would prefer to not disclose at this time  FOB age: in his 30's  FOB occupation: n/a  FOB phone number: n/a    Emergency contact name: Sherrie Wagoner  Emergency contact number: [phone]  Emergency contact relation: mother of patient    Previous pregnancy complications: hyperemesis requiring IV hydration. 1st preg- nvsd, 2nd-  @ 32wks,premature rom, 3rd- nvsd. Would like to attempt trial of labor with this preg.    Unplanned, Desired- by patient and FOB/boyfriend, per ADAM Ricardo states he will be supportive.       Financial support: Yes  Support system:Yes: patient's mother and father of baby.    Substance/Alcohol use prior to pregnancy or knowing pregnant:Yes: end of 2024, Juanito Diaz and Serg, 2 drinks, no longer drinking since found out pregnant.  Current substance/alcohol use:No  History of psychiatric concerns:No  No genetic risk identified in either side of the family.  Rlee RN    Average Risk Category  No significant risk factors: No    At Risk Category (up to 3)  Teen pregnancy: No  Poor social situation: No  Domestic abuse: No  Financial difficulties: No  Smoker: No  H/O  deliver: Yes  H/O drug abuse: No  Non-English speaking: No  Advanced maternal  age: Yes  GDM risks: No  Previous C/S: Yes  H/O PIH: No  H/O STIs: No  H/O mental health concerns: No  Onset care > 20 weeks: No  Other: AT RISK     High Risk Category (4 or more At Risk or)  Diabetes/GDM: No  Multiple gestation: No  Chronic hypertension: No  Significant hx of asthma: No  Fetal demise > 20 weeks: No  Positive tox screen: No  Current mental health treatment: No  Other: NONE    Risk: At Risk   Date determined: 7/16/2024  Lyubov EL

## 2024-07-26 ENCOUNTER — TELEPHONE (OUTPATIENT)
Dept: NURSING | Facility: CLINIC | Age: 39
End: 2024-07-26
Payer: COMMERCIAL

## 2024-07-26 NOTE — TELEPHONE ENCOUNTER
*US is indicated prior to MTOP due to patient being on OCP. She is starting a new job on Monday and will be working M-F 8am-4pm. Offered many US appointments, unable to schedule due to patient declining all options that RN was able to see. Gave her central scheduling phone number to see if they can assist her in finding an US. Also gave her [facility] cost of care department phone number as she had many questions about cost and payment plans.  [facility] phone number also provided to patient.     She see's Dr Herndon at [facility] and is requesting to see him. Did see him on 7/15, pregnancy was desired at that time. Has upcoming appointment with him on 24 and prior to hanging up the call the patient stated she will just go see him. Will route chart to Dr Herndon as FYI.     **NOTE: at this time (24 10:29am) patient has not been scheduled for US or MTOP, please connect her with [facility] MTOP RN group if she calls back so we can make sure she is scheduled appropriately      Pre-Medication  Assessment:    Haleigh Caraballo    When was the first day of your last period? Patient's last menstrual period was 2024. Patient is sure of LMP date. 7w4d    When was your positive pregnancy test? unsure       Was the test more than 52 days ago (before 24)? No    Were you using hormonal birth control, an IUD or emergency contraception when you got pregnant? YES - ULTRASOUND NEEDED    Have you ever been diagnosed with an ectopic pregnancy? No    Have you ever had a tubal ligation or sterilization procedure? No    Have you ever been diagnosed with pelvic inflammatory disease? No    Are you having one-sided pelvic pain? No    How long are your typical menstrual cycles /  How many days from the start of one period to the start of the next one?  Bleeds for 5-7 days, cycle comes once a month, always on time/schedule     During the past 3 months, has the time between periods varied from your typical cycles by  more than a few days? No, cycles have been regular.    Was LMP more than 10 weeks (70 days) ago (before 5/17/24)? No    Did your last period start earlier or later than you would have expected? No    Was your last period shorter than usual? No    Was the amount of bleeding/cramping in your last period normal for you? Yes    Have you had any other recent bleeding that was not normal for you? No      Have you ever been diagnosed with:    An allergy or intolerance to mifepristone or misoprostol?  No    Chronic renal failure?  No    Hemorrhagic disorders? No    Inherited porphyria? No    Have you used systemic corticosteroid therapy long term?  No    Are you currently on anticoagulation therapy (excluding aspirin)?  No    Based on the responses given by the patient, an ultrasound is indicated.    Patient denies all contraindications, will proceed with scheduling medication termination of pregnancy appointment and ultrasound appointment.      Anitha Roach RN

## 2024-07-30 ENCOUNTER — PATIENT OUTREACH (OUTPATIENT)
Dept: CARE COORDINATION | Facility: CLINIC | Age: 39
End: 2024-07-30
Payer: COMMERCIAL

## 2024-07-30 NOTE — PROGRESS NOTES
Clinic Care Coordination Contact  Follow Up Progress Note      Assessment:  The pt was recently in the ED, I called to check up on the pt and help the pt setup a ED follow up. I called and talked to the pt, pt stated that she still the same, pt wanted a follow up. I was able to get the pt in on 08/01/2024 at 4:00pm with .       Care Gaps:    Health Maintenance Due   Topic Date Due    ADVANCE CARE PLANNING  Never done    GLUCOSE  Never done    HIV SCREENING  Never done    HEPATITIS C SCREENING  Never done    YEARLY PREVENTIVE VISIT  11/13/2008    HPV IMMUNIZATION (3 - 3-dose series) 07/20/2016    PAP  11/22/2019    DTAP/TDAP/TD IMMUNIZATION (5 - Td or Tdap) 05/07/2023    COVID-19 Vaccine (1 - 2023-24 season) Never done

## 2024-08-01 ENCOUNTER — TELEPHONE (OUTPATIENT)
Dept: FAMILY MEDICINE | Facility: CLINIC | Age: 39
End: 2024-08-01

## 2024-08-01 ENCOUNTER — ANCILLARY PROCEDURE (OUTPATIENT)
Dept: ULTRASOUND IMAGING | Facility: CLINIC | Age: 39
End: 2024-08-01
Attending: FAMILY MEDICINE
Payer: COMMERCIAL

## 2024-08-01 ENCOUNTER — OFFICE VISIT (OUTPATIENT)
Dept: FAMILY MEDICINE | Facility: CLINIC | Age: 39
End: 2024-08-01
Payer: COMMERCIAL

## 2024-08-01 VITALS
RESPIRATION RATE: 22 BRPM | OXYGEN SATURATION: 100 % | BODY MASS INDEX: 21.51 KG/M2 | TEMPERATURE: 98.8 F | DIASTOLIC BLOOD PRESSURE: 77 MMHG | HEIGHT: 64 IN | HEART RATE: 67 BPM | WEIGHT: 126 LBS | SYSTOLIC BLOOD PRESSURE: 117 MMHG

## 2024-08-01 DIAGNOSIS — Z32.00 ENCOUNTER FOR CONFIRMATION OF PREGNANCY TEST RESULT WITH PHYSICAL EXAMINATION: ICD-10-CM

## 2024-08-01 DIAGNOSIS — K59.00 CONSTIPATION, UNSPECIFIED CONSTIPATION TYPE: ICD-10-CM

## 2024-08-01 DIAGNOSIS — O21.9 PREGNANCY RELATED NAUSEA AND VOMITING, ANTEPARTUM: Primary | ICD-10-CM

## 2024-08-01 PROCEDURE — 99213 OFFICE O/P EST LOW 20 MIN: CPT | Mod: GC

## 2024-08-01 PROCEDURE — 76801 OB US < 14 WKS SINGLE FETUS: CPT | Mod: TC | Performed by: RADIOLOGY

## 2024-08-01 RX ORDER — ONDANSETRON 8 MG/1
8 TABLET, FILM COATED ORAL EVERY 8 HOURS PRN
Qty: 90 TABLET | Refills: 2 | Status: SHIPPED | OUTPATIENT
Start: 2024-08-01

## 2024-08-01 NOTE — LETTER
August 1, 2024      Re: Haleigh Caraballo   1985    To Whom It May Concern:    This is to confirm that the above patient was seen on 8/1/2024.  Please excuse Haleigh for missing today and tomorrow.     Haleigh Caraballo is able to return to work on Monday August 5.    Thank you for your cooperation in this matter.  Please do not hesitate to contact me if you have any further questions.    Sincerely,      MELANIE KWONG

## 2024-08-01 NOTE — PROGRESS NOTES
Assessment & Plan     (O21.9) Pregnancy related nausea and vomiting, antepartum  (primary encounter diagnosis)  Comment: 8 weeks gestation complaining of nausea and vomiting frequently that started few days ago.  Patient was seen in the ED 7/29 for the same concern.  She has been taking metoclopramide and Zofran 4 mg and does not feel like it was helping.  She notes history of hyperemesis during her prior pregnancy requiring IV fluid infusions.  She notes at this point with her previous pregnancies her N/V was worse.  She is currently able to take in fluids but struggles with food.  Advised to continue frequent hydration and avoid getting hungry by eating small bland snacks like crackers frequently and small meals.  Return precautions and signs of dehydration discussed.  She has follow-up with her primary OB.  Will increase dose of zofran in the meantime. Anticipate she will need home IV fluid infusion set up if N/v continue to worsen over the next few weeks.  Plan: ondansetron (ZOFRAN) 8 MG tablet            (K59.00) Constipation, unspecified constipation type  Comment: Lower abdominal pain and constipation.  Likely given low oracowl intake and Zofran.  Advised to take MiraLAX.    Plan: polyethylene glycol (MIRALAX) 17 GM/Dose powder            Sharon Ricardo is a 39 year old, presenting for the following health issues:  ER F/U      8/1/2024     4:15 PM   Additional Questions   Roomed by Surendra   Accompanied by FLORECITA         8/1/2024    Information    services provided? No        HPI   Patient presented to the ED 7/9/24 with right lower abdominal pain and increased vaginal discharge where she was found to have a positive urine and blood pregnancy test. Since then, she had continued abdominal pain. She has OB follow-up scheduled with her PCP, Dr. Herndon, upcoming on 8/9/24.  She denies vaginal discharge.  Today she presents c/o nausea and vomiting frequently.  Patient reports history of  "hyperemesis with her previous pregnancies.  She states she is currently around 8 weeks and feels like the nausea and emesis has been less significant so far compared to her prior pregnancy.  Of note her last pregnancy was 11 years ago.  She has required IV fluid infusions with most of her pregnancies.         Review of Systems  Constitutional, HEENT, cardiovascular, pulmonary, gi and gu systems are negative, except as otherwise noted.      Objective    /77   Pulse 67   Temp 98.8  F (37.1  C)   Resp 22   Ht 1.626 m (5' 4\")   Wt 57.2 kg (126 lb)   LMP 06/03/2024   SpO2 100%   BMI 21.63 kg/m    Body mass index is 21.63 kg/m .  Physical Exam   GENERAL: alert and no distress  RESP: lungs clear to auscultation - no rales, rhonchi or wheezes  CV: regular rate and rhythm, normal S1 S2, no murmur  ABDOMEN: soft, nontender, no masses and bowel sounds normal  MS: no gross musculoskeletal defects noted, no edema          Signed Electronically by: BASSEM Lind PGY3    "

## 2024-08-01 NOTE — TELEPHONE ENCOUNTER
As per chart review, writer called patient for further discussion. Per RN note 7/26/2024 patient considering MTOP. Haleigh's clinic appointment for this afternoon is scheduled incorrectly (type of appt, reason for appt, MD), in addition provider is not a MTOP participant. Left a message for Haleigh to call writer back for further discussion. Anticipate rescheduling this afternoon's appointment if above information is correct. If would like to continue pregnancy, Haleigh already has NOB appointment scheduled with Dr Herndon, her assigned OB provider at [facility] 8/9/2024 at recommended gestational age. Thank you. Lyubov EL

## 2024-08-02 NOTE — TELEPHONE ENCOUNTER
8/1/2024 @ 425pm discussed in person, while in clinic with Haleigh, who has decided to keep the pregnancy. Will complete ultrasound today to assess gestational age of pregnancy. Will also be seen for nausea following ED visit, nausea not much improved even with medication given. Lyubov EL

## 2024-08-03 RX ORDER — POLYETHYLENE GLYCOL 3350 17 G/17G
17 POWDER, FOR SOLUTION ORAL DAILY
Qty: 510 G | Refills: 2 | Status: SHIPPED | OUTPATIENT
Start: 2024-08-03

## 2024-08-05 NOTE — PROGRESS NOTES
Faculty Supervision of Residents   I have examined this patient and the medical care has been evaluated and discussed with the resident. See resident note outlining our discussion.    Denise Eptsein MD

## 2024-08-14 ENCOUNTER — OFFICE VISIT (OUTPATIENT)
Dept: FAMILY MEDICINE | Facility: CLINIC | Age: 39
End: 2024-08-14
Payer: COMMERCIAL

## 2024-08-14 VITALS
WEIGHT: 131 LBS | DIASTOLIC BLOOD PRESSURE: 64 MMHG | HEART RATE: 81 BPM | BODY MASS INDEX: 21.83 KG/M2 | HEIGHT: 65 IN | RESPIRATION RATE: 22 BRPM | SYSTOLIC BLOOD PRESSURE: 102 MMHG | OXYGEN SATURATION: 100 % | TEMPERATURE: 98.3 F

## 2024-08-14 DIAGNOSIS — Z11.59 NEED FOR HEPATITIS C SCREENING TEST: ICD-10-CM

## 2024-08-14 DIAGNOSIS — Z12.4 CERVICAL CANCER SCREENING: ICD-10-CM

## 2024-08-14 DIAGNOSIS — Z11.4 SCREENING FOR HIV (HUMAN IMMUNODEFICIENCY VIRUS): Primary | ICD-10-CM

## 2024-08-14 DIAGNOSIS — Z34.81 ENCOUNTER FOR SUPERVISION OF OTHER NORMAL PREGNANCY IN FIRST TRIMESTER: ICD-10-CM

## 2024-08-14 LAB
ABO/RH(D): NORMAL
ANTIBODY SCREEN: NEGATIVE
ERYTHROCYTE [DISTWIDTH] IN BLOOD BY AUTOMATED COUNT: 13.3 % (ref 10–15)
GROUP B STREPTOCOCCUS (EXTERNAL): POSITIVE
HBV SURFACE AG SERPL QL IA: NONREACTIVE
HCT VFR BLD AUTO: 36 % (ref 35–47)
HCV AB SERPL QL IA: NONREACTIVE
HGB BLD-MCNC: 11.5 G/DL (ref 11.7–15.7)
HIV 1+2 AB+HIV1 P24 AG SERPL QL IA: NONREACTIVE
MCH RBC QN AUTO: 30.6 PG (ref 26.5–33)
MCHC RBC AUTO-ENTMCNC: 31.9 G/DL (ref 31.5–36.5)
MCV RBC AUTO: 96 FL (ref 78–100)
PLATELET # BLD AUTO: 325 10E3/UL (ref 150–450)
RBC # BLD AUTO: 3.76 10E6/UL (ref 3.8–5.2)
RUBV IGG SERPL QL IA: 3.27 INDEX
RUBV IGG SERPL QL IA: POSITIVE
SPECIMEN EXPIRATION DATE: NORMAL
SPECIMEN EXPIRATION DATE: NORMAL
T PALLIDUM AB SER QL: NONREACTIVE
WBC # BLD AUTO: 6.4 10E3/UL (ref 4–11)

## 2024-08-14 PROCEDURE — 86762 RUBELLA ANTIBODY: CPT

## 2024-08-14 PROCEDURE — 86780 TREPONEMA PALLIDUM: CPT

## 2024-08-14 PROCEDURE — 99207 PR FIRST OB VISIT: CPT | Mod: GC

## 2024-08-14 PROCEDURE — 85027 COMPLETE CBC AUTOMATED: CPT

## 2024-08-14 PROCEDURE — 87340 HEPATITIS B SURFACE AG IA: CPT

## 2024-08-14 PROCEDURE — 86900 BLOOD TYPING SEROLOGIC ABO: CPT

## 2024-08-14 PROCEDURE — 36415 COLL VENOUS BLD VENIPUNCTURE: CPT

## 2024-08-14 PROCEDURE — 83655 ASSAY OF LEAD: CPT | Mod: 90

## 2024-08-14 PROCEDURE — 87389 HIV-1 AG W/HIV-1&-2 AB AG IA: CPT

## 2024-08-14 PROCEDURE — 99000 SPECIMEN HANDLING OFFICE-LAB: CPT

## 2024-08-14 PROCEDURE — 86850 RBC ANTIBODY SCREEN: CPT

## 2024-08-14 PROCEDURE — 87591 N.GONORRHOEAE DNA AMP PROB: CPT

## 2024-08-14 PROCEDURE — 87086 URINE CULTURE/COLONY COUNT: CPT

## 2024-08-14 PROCEDURE — 87491 CHLMYD TRACH DNA AMP PROBE: CPT

## 2024-08-14 PROCEDURE — 86901 BLOOD TYPING SEROLOGIC RH(D): CPT

## 2024-08-14 PROCEDURE — 87088 URINE BACTERIA CULTURE: CPT

## 2024-08-14 PROCEDURE — 86803 HEPATITIS C AB TEST: CPT

## 2024-08-14 NOTE — LETTER
August 21, 2024      Haleigh Caraballo  [address] [place] [address]        Dear ,    We are writing to inform you of your test results.    No abnormal results on the labs that we have done.     Resulted Orders   Culture Urine   Result Value Ref Range    Culture (A)      <1,000 CFU/mL Streptococcus agalactiae (Group B Streptococcus)      Comment:      This organism is susceptible to ampicillin, penicillin, vancomycin and the cephalosporins. If treatment is required AND your patient is allergic to penicillin, contact the Microbiology Lab within 5 days to request susceptibility testing.   Chlamydia trachomatis PCR  URINE   Result Value Ref Range    Chlamydia trachomatis Negative Negative      Comment:      A negative result by transcription mediated amplification does not preclude the presence of C. trachomatis infection because results are dependent on proper and adequate collection, absence of inhibitors and sufficient rRNA to be detected.   Neisseria gonorrhoeae PCR  URINE   Result Value Ref Range    Neisseria gonorrhoeae Negative Negative      Comment:      Negative for N. gonorrhoeae rRNA by transcription mediated amplification. A negative result by transcription mediated amplification does not preclude the presence of C. trachomatis infection because results are dependent on proper and adequate collection, absence of inhibitors and sufficient rRNA to be detected.   Hepatitis C Screen Reflex to HCV RNA Quant and Genotype   Result Value Ref Range    Hepatitis C Antibody Nonreactive Nonreactive      Comment:      A nonreactive screening test result does not exclude the possibility of exposure to or infection with HCV. Nonreactive screening test results in individuals with prior exposure to HCV may be due to antibody levels below the limit of detection of this assay or lack of reactivity to the HCV antigens used in this assay. Patients with recent HCV infections (<3 months from time of  "exposure) may have false-negative HCV antibody results due to the time needed for seroconversion (average of 8 to 9 weeks).   ABO/Rh Type-HML   Result Value Ref Range    ABO/RH(D) O POS     SPECIMEN EXPIRATION DATE [id number]    Antibody Screen   Result Value Ref Range    Antibody Screen Negative Negative    SPECIMEN EXPIRATION DATE [id number]    CBC with Plt   Result Value Ref Range    WBC Count 6.4 4.0 - 11.0 10e3/uL    RBC Count 3.76 (L) 3.80 - 5.20 10e6/uL    Hemoglobin 11.5 (L) 11.7 - 15.7 g/dL    Hematocrit 36.0 35.0 - 47.0 %    MCV 96 78 - 100 fL    MCH 30.6 26.5 - 33.0 pg    MCHC 31.9 31.5 - 36.5 g/dL    RDW 13.3 10.0 - 15.0 %    Platelet Count 325 150 - 450 10e3/uL   Hepatitis B Surface Ag   Result Value Ref Range    Hepatitis B Surface Antigen Nonreactive Nonreactive   HIV Ag/Ab Screen Cascade   Result Value Ref Range    HIV Antigen Antibody Combo Nonreactive Nonreactive      Comment:      Negative HIV-1 p24 antigen and HIV-1/2 antibody screening test results usually indicate the absence of HIV-1 and HIV-2 infection. However, such negative results do not rule-out acute HIV infection.  If acute HIV-1 or HIV-2 infection is suspected, detection of HIV-1 or HIV-2 RNA  is recommended.    Lead, Blood   Result Value Ref Range    Lead Venous Blood <2.0 <=4.9 ug/dL      Comment:      INTERPRETIVE INFORMATION: Lead, Blood (Venous)    Analysis performed by Inductively Coupled Plasma-Mass   Spectrometry (ICP-MS).    Elevated results may be due to skin or collection-related   contamination, including the use of a noncertified   lead-free tube. If contamination concerns exist due to   elevated levels of blood lead, confirmation with a second   specimen collected in a certified lead-free tube is   recommended.    Information sources for blood lead reference intervals and   interpretive comments include the CDC's \"Childhood Lead   Poisoning Prevention: Recommended Actions Based on Blood   Lead Level\" and the " "\"Adult Blood Lead Epidemiology and   Surveillance: Reference Blood Lead Levels (BLLs) for Adults   in the [place].\" Thresholds and time intervals for retesting,   medical evaluation, and response vary by state and   regulatory body. Contact your State Department of Health   and/or applicable regulatory agency for specific guidance   on medical management  recommendations.    This test was developed and its performance characteristics   determined by [facility]. It has not been cleared or   approved by the U.S. Food and Drug Administration. This   test was performed in a CLIA-certified laboratory and is   intended for clinical purposes.         Group          Concentration   Comment    Children       3.5-19.9 ug/dL  Children under the age of 6                                 years are the most vulnerable                                 to the harmful effects of                                  lead exposure. Environmental                                  investigation and exposure                                  history to identify potential                                 sources of lead. Biological                                  and nutritional monitoring                                 are recommended. Follow-up                                  blood lead monitoring is                                  recommended.                                 20-44.9 ug/dL   Lead hazard reduction and                                  prompt medical evaluation are                                 recommended. Contact a                                  Pediatric Environmental                                  Health Specialty Unit or                                  poison control center for                                  guidance.                   Greater than    Critical. Immediate medical                  44.9 ug/dL      evaluation, including                                  detailed neurological exam is                  "                recommended. Consider                                  chelation therapy when                                 symptoms of lead toxicity   are                                  present. Contact a Pediatric                                  Environmental Health                                  Specialty Unit or poison                                  control center for                                   assistance.    Adult          5-19.9 ug/dL    Medical removal is                                  recommended for pregnant                                  women or those who are trying                                 or may become pregnant.                                  Adverse health effects are                                  possible. Reduced lead                                  exposure and increased blood                                  lead monitoring are                                  recommended.                    20-69.9 ug/dL   Adverse health effects are                                  indicated. Medical removal                                  from lead exposure is                                  required by OSHA if blood                                  lead level exceeds 50 ug/dL.                                 Prompt medical evaluation is                                 recommended.                    Greater than    Critical. Immediate medical                   69.9 ug/dL      evaluation is recommended.                                  Consider chelation therapy                                 when symptoms of lead                                  toxicity are present.  Performed By: [email or web address]  [address] [place] [address]  : Sachin Flaherty MD, PhD  CLIA Number: [id number]   Rubella Antibody IgG   Result Value Ref Range    Rubella Cierra IgG Instrument Value 3.27 <0.90 Index    Rubella Antibody IgG Positive       Comment:      Suggests  previous exposure or immunization and probable immunity.   Syphilis Screen Cascade   Result Value Ref Range    Treponema Antibody Total Nonreactive Nonreactive       If you have any questions or concerns, please call the clinic at the number listed above.       Sincerely,      Robin Ryan MD

## 2024-08-14 NOTE — LETTER
RETURN TO WORK/SCHOOL FORM    8/14/2024    Re: Haleigh Caraballo  1985      To Whom It May Concern:     Haleigh Caraballo was seen in clinic today..  She may return to work with restrictions on 8/15/24          Restrictions:  limit the amount of time on feet, heavy lifting (>20lbs), and prolonged periods of standing.      Grant Hassan MD  8/14/2024 9:28 AM

## 2024-08-14 NOTE — PROGRESS NOTES
Haleigh Caraballo is a 39 year old  female who presents to clinic for a new OB visit.      Her last menstrual period was Beginning of  ().  She had a dating US on 24 and was estimated to be at 8w3d gestation.    Estimated Date of Delivery: Mar 10, 2025 via 8w1d US    This was not a planned pregnancy    She has not had bleeding since her LMP. She has not had any abdominal pain.  She has had mild cramping since her LMP. She has had nausea and has been taking doxylamine with some relief. Weigh loss has not occurred.     OTHER CONCERNS:   none    Social History  Occupation: [employer]ans [employer]  Other children: 4 children at home, youngest is 11  FOB: In a non-abusive monogamous sexual relationship with FOB but he is Not involved    Obstetric History     No history of hypertensive disorders of pregnancy, GDM, PPH.       Pre Term Labor Risk Assessment  Is the patient's age <18 or >40? No  Patint's BMI is Body mass index is 21.8 kg/m .. Does patient have a BMI < 18.5? No  If previous pregnancy, was delivery within previous 6 months? No  Have you ever delivered a baby prior to 37 weeks gestation? Yes Twins C/S at 32weeks  Did conception for this pregnancy occur via In Vitro Fertilization? No  Summary: Patient is not high risk for  Labor for  labor.    Patient Active Problem List   Diagnosis    Marijuana abuse    Multiple gestation with malpresentation of one fetus or more    Myopic astigmatism of both eyes     delivery    Previous  delivery affecting pregnancy    Trichomoniasis       OB History    Para Term  AB Living   5 3 2 1 1 4   SAB IAB Ectopic Multiple Live Births   1 0 0 1 4      # Outcome Date GA Lbr Ras/2nd Weight Sex Type Anes PTL Lv   5 Current            4 SAB 2024 6w0d    SAB      3 Term  40w0d   M    ELLA   2A   32w0d   M -SEC  Y ELLA      Birth Comments: PROM, Twin gestation, delivered via    2B    32w0d   M -SEC  Y ELLA   1 Term  40w0d   M   N ELLA       No past medical history on file.    No past surgical history on file.    Current Outpatient Medications   Medication Sig Dispense Refill    ibuprofen (ADVIL/MOTRIN) 800 MG tablet Take 1 tablet (800 mg) by mouth every 8 hours as needed for other (Cramping) Take with food (Patient not taking: Reported on 2024) 21 tablet 1    ondansetron (ZOFRAN) 4 MG tablet Take 1 tablet (4 mg) by mouth every 8 hours as needed for nausea (Patient not taking: Reported on 2024) 10 tablet 0    ondansetron (ZOFRAN) 8 MG tablet Take 1 tablet (8 mg) by mouth every 8 hours as needed for nausea (Patient not taking: Reported on 2024) 90 tablet 2    polyethylene glycol (MIRALAX) 17 GM/Dose powder Take 17 g by mouth daily (Patient not taking: Reported on 2024) 510 g 2    Prenatal Vit-Fe Fumarate-FA (PRENATAL MULTIVITAMIN W/IRON) 27-0.8 MG tablet Take 1 tablet by mouth daily (Patient not taking: Reported on 2024) 90 tablet 3    pyridOXINE (VITAMIN B6) 25 MG tablet Take 1 tablet (25 mg) by mouth 4 times daily as needed (Patient not taking: Reported on 2024) 30 tablet 3       Do you have a history of any of the following medical conditions?     Does get frequent UTIs    Condition Yes/No   Hypertension No   Heart disease, mitral valve prolapse, rheumatic fever No   Asthma or another chronic lung disease No   An autoimmune disorder No   Kidney disease No   Frequent urinary tract infections No   Migraine headaches No   Stroke, loss of sensation/function, seizures, or other neuro problem No   Diabetes No   Thyroid problems or have you taken thyroid medication No   Hepatitis, liver disease, jaundice No   Blood clots, phlebitis, pulmonary embolism or varicose veins No   Excessive bleeding after surgery or dental work No   Heavy menstrual periods requiring treatment No   Anemia No   Blood transfusions No        Would you refuse a blood transfusion? No   Breast  problems No   Abnormalities of the uterus No   Abnormal pap smear No   Have you been treated for an emotional disturbance? No   Have you been physically, sexually, or emotionally hurt by someone? No   Have you been in a major accident or suffered serious trauma? No   Have you had surgical procedures? No        Have you ever had any complications from anesthesia? No   Have you ever been hospitalized for a nonsurgical reason? No     Notes for positives:  Frequent UTIs    Prenatal Genetic Screening   Do you have a history of any of the following Yes/No        A metabolic disorder (e.g. Insulin-dependent DM, PKU) No        Recurrent pregnancy loss No     Do you, the baby's father, or anyone in your families have Yes/No        Thalassemia AND MCV <80 No        Hemophilia No        Neural tube defect No        Congenital heart defect No        Sickle cell disease or trait No        Muscular dystrophy No        Cystic fibrosis No        Mental retardation or autism No        Down's syndrome No        Syd-Sach's disease No        Yasir's chorea No        Any other inherited genetic or chromosomal disorder No        A child with birth defects not listed above No     Infection History   At high risk for coming in contact with HIV No   Ever treated for tuberculosis No   Ever received the BCG vaccine for tuberculosis No   Ever had genital herpes (or has your partner) No   Had a rash or viral illness since LMP No   Ever had a sexually transmitted infection No   Ever had chicken pox or the vaccine Yes   Ever had any other serious infectious disease No   Up to date with immunizations Yes   STI History None          PERSONAL/SOCIAL HISTORY  Social History     Socioeconomic History    Marital status: Single     Spouse name: None    Number of children: None    Years of education: None    Highest education level: None   Tobacco Use    Smoking status: Never     Passive exposure: Never    Smokeless tobacco: Never     Social  "Determinants of Health     Interpersonal Safety: Low Risk  (2024)    Interpersonal Safety     Do you feel physically and emotionally safe where you currently live?: Yes     Within the past 12 months, have you been hit, slapped, kicked or otherwise physically hurt by someone?: No     Within the past 12 months, have you been humiliated or emotionally abused in other ways by your partner or ex-partner?: No     Have you used any tobacco products, alcohol or any other drugs during this pregnancy before or after your knew you were pregnant? No    REVIEW OF SYSTEMS    No other significant symptoms    PHYSICAL EXAM:  /64   Pulse 81   Temp 98.3  F (36.8  C)   Resp 22   Ht 1.651 m (5' 5\")   Wt 59.4 kg (131 lb)   LMP 2024   SpO2 100%   BMI 21.80 kg/m       GENERAL:  Pleasant pregnant female, alert, well groomed.  SKIN:  Warm and dry, without lesions or rashes  EYES:  no obvious abnormalities  NECK:  Thyroid without enlargement and nodules.  No cervical lymphadenopathy.  LUNGS:  Clear to auscultation.  HEART:  RRR without murmur.  ABDOMEN: Soft without masses , tenderness or organomegaly.    MUSCULOSKELETAL:  Full range of motion.  EXTREMITIES:  No edema. No significant varicosities on limited skin exam  UTERUS: Unable to palpate above pubic symphysis       ASSESSMENT/PLAN  Patient Active Problem List   Diagnosis    Marijuana abuse    Multiple gestation with malpresentation of one fetus or more    Myopic astigmatism of both eyes     delivery    Previous  delivery affecting pregnancy    Trichomoniasis       Haleigh Caraballo is a 39 year old  female at Unknown here for initial OB visit. Prenatal course uncomplicated thus far. OB history significant for twins delivered by  followed by one  and one spontaneous  in 2024    Routine prenatal care   - Routine prenatal labs today: CBC with platelets, type and screen, rubella, HIV, RPR, hepatitis B, " gonorrhea/chlamydia, urinalysis with culture.   - Consider A1C if obesity or history of GDM  - Initiate ASA 81 mg daily at 12 weeks gestation for preeclampsia ppx if one high risk factor or two moderate risk factors   - Pap smear due, declined by patient today.   - Early ultrasound for dating yes.   - Anatomy scan ordered for 18-20 weeks gestation `no    Referral(s): none    2010  A Pfannenstiel-type transverse lower abdominal incision was made and carried through the subcutaneous tissue to the rectus fascia. The rectus fascia was incised in a curvilinear transverse fashion and elevated from the underlying rectus muscles. The rectus muscles were  in the midline. The peritoneal cavity was identified and entered without difficulty.   A transverse curvilinear incision was made in the vesicouterine peritoneum. The bladder flap was developed and the bladder was retracted free from the intact lower uterine segment. A transverse curvilinear incision was made in the lower uterine segment.   The mother was taken to the recovery room in good condition. The infants were taken to the NICU in stable condition.     2013  She progressed along a normal labor pattern to have a  of a 6# 2oz male infant. Postpartum course was uncomplicated.       TOLAC counseling was not done, Predicted chance of vaginal birth after :94.8% (via [email or web address]). Previous  for twin gestation.  Operative report reviewed today and transverse uterine incision confirmed. Unable to determine appropriateness at this time for TOLAC     Discussed:  - Influenza vaccine not available at this time.  - Genetic screening with NIPT (cell-free DNA) desired by patient.  - Discussed taking prenatal vitamin daily. Patient has not been because they make her feel more sick.  Discussed the importance, and she was open to trying a flintstone vitamin.  - Healthy habits including not  using tobacco or alcohol, exercising regularly, and maintaining healthy diet  - Information given on tips for dealing with nausea, healthy habits, exposures, safety, prenatal appointment schedule, and when to call the doctor.      Will follow up in 4 weeks for routine prenatal care.    Grant Hassan MD   [facility]     Precepted with: Dr. Ryan

## 2024-08-14 NOTE — LETTER
August 15, 2024      Haleigh Caraballo  [address] [place] [address]        Dear ,    We are writing to inform you of your test results.    gonorrhea and chlamydia screens are negative     Resulted Orders   Culture Urine   Result Value Ref Range    Culture Culture in progress    Chlamydia trachomatis PCR  URINE   Result Value Ref Range    Chlamydia trachomatis Negative Negative      Comment:      A negative result by transcription mediated amplification does not preclude the presence of C. trachomatis infection because results are dependent on proper and adequate collection, absence of inhibitors and sufficient rRNA to be detected.   Neisseria gonorrhoeae PCR  URINE   Result Value Ref Range    Neisseria gonorrhoeae Negative Negative      Comment:      Negative for N. gonorrhoeae rRNA by transcription mediated amplification. A negative result by transcription mediated amplification does not preclude the presence of C. trachomatis infection because results are dependent on proper and adequate collection, absence of inhibitors and sufficient rRNA to be detected.   Hepatitis C Screen Reflex to HCV RNA Quant and Genotype   Result Value Ref Range    Hepatitis C Antibody Nonreactive Nonreactive      Comment:      A nonreactive screening test result does not exclude the possibility of exposure to or infection with HCV. Nonreactive screening test results in individuals with prior exposure to HCV may be due to antibody levels below the limit of detection of this assay or lack of reactivity to the HCV antigens used in this assay. Patients with recent HCV infections (<3 months from time of exposure) may have false-negative HCV antibody results due to the time needed for seroconversion (average of 8 to 9 weeks).   ABO/Rh Type-HML   Result Value Ref Range    ABO/RH(D) O POS     SPECIMEN EXPIRATION DATE [id number]    Antibody Screen   Result Value Ref Range    Antibody Screen Negative Negative     SPECIMEN EXPIRATION DATE [id number]    CBC with Plt   Result Value Ref Range    WBC Count 6.4 4.0 - 11.0 10e3/uL    RBC Count 3.76 (L) 3.80 - 5.20 10e6/uL    Hemoglobin 11.5 (L) 11.7 - 15.7 g/dL    Hematocrit 36.0 35.0 - 47.0 %    MCV 96 78 - 100 fL    MCH 30.6 26.5 - 33.0 pg    MCHC 31.9 31.5 - 36.5 g/dL    RDW 13.3 10.0 - 15.0 %    Platelet Count 325 150 - 450 10e3/uL   Hepatitis B Surface Ag   Result Value Ref Range    Hepatitis B Surface Antigen Nonreactive Nonreactive   HIV Ag/Ab Screen Cascade   Result Value Ref Range    HIV Antigen Antibody Combo Nonreactive Nonreactive      Comment:      Negative HIV-1 p24 antigen and HIV-1/2 antibody screening test results usually indicate the absence of HIV-1 and HIV-2 infection. However, such negative results do not rule-out acute HIV infection.  If acute HIV-1 or HIV-2 infection is suspected, detection of HIV-1 or HIV-2 RNA  is recommended.    Rubella Antibody IgG   Result Value Ref Range    Rubella Cierra IgG Instrument Value 3.27 <0.90 Index    Rubella Antibody IgG Positive       Comment:      Suggests previous exposure or immunization and probable immunity.   Syphilis Screen Cascade   Result Value Ref Range    Treponema Antibody Total Nonreactive Nonreactive       If you have any questions or concerns, please call the clinic at the number listed above.       Sincerely,      Robin Ryan MD

## 2024-08-15 LAB
C TRACH DNA SPEC QL NAA+PROBE: NEGATIVE
N GONORRHOEA DNA SPEC QL NAA+PROBE: NEGATIVE

## 2024-08-16 LAB
BACTERIA UR CULT: ABNORMAL
LEAD BLDV-MCNC: <2 UG/DL

## 2024-08-19 ENCOUNTER — TELEPHONE (OUTPATIENT)
Dept: FAMILY MEDICINE | Facility: CLINIC | Age: 39
End: 2024-08-19
Payer: COMMERCIAL

## 2024-08-19 NOTE — TELEPHONE ENCOUNTER
Test Results        Who ordered the test:  Dr. Hassan    Type of test: Lab    Date of test:  08/14/2024      What are your questions/concerns?:  Patient has questions about labs - wants to know what other results are pending, please review and call back thank you.    Okay to leave a detailed message?: No

## 2024-08-21 NOTE — TELEPHONE ENCOUNTER
"Patient returned PCS call - relayed results from Dr. Hassan:    \" Team, please update our patient     No abnormal results on the labs that we have done.       The prenatal genetic screen has not resulted yet.     Thanks!\"    No questions. Hung up.  "

## 2024-08-26 LAB — SCANNED LAB RESULT: NORMAL

## 2024-09-18 ENCOUNTER — OFFICE VISIT (OUTPATIENT)
Dept: FAMILY MEDICINE | Facility: CLINIC | Age: 39
End: 2024-09-18
Payer: COMMERCIAL

## 2024-09-18 VITALS
OXYGEN SATURATION: 100 % | SYSTOLIC BLOOD PRESSURE: 99 MMHG | DIASTOLIC BLOOD PRESSURE: 63 MMHG | HEART RATE: 83 BPM | RESPIRATION RATE: 18 BRPM

## 2024-09-18 DIAGNOSIS — Z34.82 ENCOUNTER FOR SUPERVISION OF OTHER NORMAL PREGNANCY IN SECOND TRIMESTER: ICD-10-CM

## 2024-09-18 DIAGNOSIS — Z12.4 CERVICAL CANCER SCREENING: Primary | ICD-10-CM

## 2024-09-18 PROCEDURE — 99207 PR PRENATAL VISIT: CPT | Mod: GC

## 2024-09-18 NOTE — PROGRESS NOTES
Preceptor Attestation:   Patient seen, evaluated and discussed with the resident. I have verified the content of the note, which accurately reflects my assessment of the patient and the plan of care.    Supervising Physician:Jaz Manzo MD    [facility]

## 2024-09-18 NOTE — PROGRESS NOTES
"Haleigh Caraballo is a 39 year old  female who presents to clinic for a follow up OB visit. She is currently 15w2d with an estimated date of delivery of Mar 10, 2025 via 1st trimester US. She denies headache, chest pain, shortness of breath, abdominal pain/contractions, vaginal bleeding, or abnormal discharge. Prenatal multivitamin - 1/7 days. Flinestone multivitamin gummies if she can't.    New concerns today:   No concerns. A little nauseous once in a while.     OB History    Para Term  AB Living   5 3 2 1 1 4   SAB IAB Ectopic Multiple Live Births   1 0 0 1 4      # Outcome Date GA Lbr Ras/2nd Weight Sex Type Anes PTL Lv   5 Current            4 SAB 2024 6w0d    SAB      3 Term  40w0d   M    ELLA   2A   32w0d   M -SEC  Y ELLA      Birth Comments: PROM, Twin gestation, delivered via    2B   32w0d   M -SEC  Y ELLA   1 Term  40w0d   M   N ELLA       Physical exam:  BP 99/63   Pulse 83   Resp 18   LMP 2024   SpO2 100%   Wt Readings from Last 2 Encounters:   24 59.4 kg (131 lb)   24 57.2 kg (126 lb)       General: alert, female in no acute distress  Abdomen: gravid uterus appropriate for gestation age above pubic symphysis, non-tender, FHTs 140s with accel into the 150s  Extremities: no edema    Prenatal labs  Blood type: O POS  Hgb:   Hemoglobin   Date Value Ref Range Status   2024 11.5 (L) 11.7 - 15.7 g/dL Final     Pap: No results found for: \"PAP\"  Hep B, HIV, syphilis, gonorrhea, chlamydia negative  Rubella immune  UA/Ucx positive for GBS < 1K    Assessment/Plan:  Patient Active Problem List   Diagnosis    Marijuana abuse    Multiple gestation with malpresentation of one fetus or more    Myopic astigmatism of both eyes     delivery    Previous  delivery affecting pregnancy    Trichomoniasis       Haleigh Caraballo is a 39 year old  female at 15w2d here for initial OB visit. Prenatal course " complicated by GBS positive Urine Cx. OB history significant for  twin delivery, GBS positive urine culture this pregnancy.     Routine prenatal care   - Reviewed prenatal labs. Normal other than positive urine culture for GBS  - NIPT normal   - Discussed warning signs to watch for and expectations for second trimester.   - Anatomy US ordered for 19 weeks gestation  - Continue to try to take prenatal daily   If you have any bleeding or leakage of fluids.   If you have uterine cramping that does not resolve with rest and fluids.  If you have a headache not resolved with tylenol, right upper abdominal pain, or sudden onset of swelling.      Follow up in 4 weeks for routine prenatal visit.     Santos Herndon MD   [facility]    Precepted with: Dr. Manzo

## 2024-09-24 ENCOUNTER — PATIENT OUTREACH (OUTPATIENT)
Dept: CARE COORDINATION | Facility: CLINIC | Age: 39
End: 2024-09-24
Payer: COMMERCIAL

## 2024-09-24 NOTE — PROGRESS NOTES
Clinic Care Coordination Contact  Follow Up Progress Note      Assessment:  The pt was recently in the ED, I called to check up on the pt and help the pt setup a ED follow up. I called the pt,but got her vm, so I left a vm for the pt to give me a call back. The pt has a follow up on 10/01/2024 at 9:40am with .    Care Gaps:    Health Maintenance Due   Topic Date Due    ADVANCE CARE PLANNING  Never done    GLUCOSE  Never done    YEARLY PREVENTIVE VISIT  11/13/2008    HPV IMMUNIZATION (3 - 3-dose series) 07/20/2016    PAP  11/22/2019    DTAP/TDAP/TD IMMUNIZATION (5 - Td or Tdap) 05/07/2023    INFLUENZA VACCINE (1) 09/01/2024    COVID-19 Vaccine (1 - 2024-25 season) Never done

## 2024-10-17 ENCOUNTER — ANCILLARY PROCEDURE (OUTPATIENT)
Dept: ULTRASOUND IMAGING | Facility: CLINIC | Age: 39
End: 2024-10-17
Attending: FAMILY MEDICINE
Payer: COMMERCIAL

## 2024-10-17 DIAGNOSIS — Z34.82 ENCOUNTER FOR SUPERVISION OF OTHER NORMAL PREGNANCY IN SECOND TRIMESTER: ICD-10-CM

## 2024-10-17 PROCEDURE — 76805 OB US >/= 14 WKS SNGL FETUS: CPT | Mod: TC | Performed by: RADIOLOGY

## 2024-10-18 ENCOUNTER — OFFICE VISIT (OUTPATIENT)
Dept: FAMILY MEDICINE | Facility: CLINIC | Age: 39
End: 2024-10-18
Payer: COMMERCIAL

## 2024-10-18 ENCOUNTER — PATIENT OUTREACH (OUTPATIENT)
Dept: CARE COORDINATION | Facility: CLINIC | Age: 39
End: 2024-10-18

## 2024-10-18 VITALS
SYSTOLIC BLOOD PRESSURE: 102 MMHG | OXYGEN SATURATION: 98 % | HEIGHT: 64 IN | TEMPERATURE: 98 F | WEIGHT: 142.08 LBS | HEART RATE: 93 BPM | BODY MASS INDEX: 24.26 KG/M2 | DIASTOLIC BLOOD PRESSURE: 63 MMHG | RESPIRATION RATE: 20 BRPM

## 2024-10-18 DIAGNOSIS — O21.0 HYPEREMESIS GRAVIDARUM: Primary | ICD-10-CM

## 2024-10-18 DIAGNOSIS — Z34.82 ENCOUNTER FOR SUPERVISION OF OTHER NORMAL PREGNANCY IN SECOND TRIMESTER: ICD-10-CM

## 2024-10-18 PROCEDURE — 99207 PR PRENATAL VISIT: CPT | Mod: GC

## 2024-10-18 RX ORDER — METOCLOPRAMIDE 5 MG/1
10 TABLET ORAL 3 TIMES DAILY PRN
Qty: 90 TABLET | Refills: 1 | Status: SHIPPED | OUTPATIENT
Start: 2024-10-18

## 2024-10-18 RX ORDER — PYRIDOXINE HCL (VITAMIN B6) 25 MG
25 TABLET ORAL 4 TIMES DAILY PRN
Qty: 30 TABLET | Refills: 3 | Status: SHIPPED | OUTPATIENT
Start: 2024-10-18

## 2024-10-18 NOTE — PROGRESS NOTES
Preceptor Attestation:  Patient's case reviewed and discussed with the resident, Santos Herndon MD, and I personally evaluated the patient. I agree with written assessment and plan of care.    Supervising Physician:  Racheal Pina MD   [facility]

## 2024-10-18 NOTE — PATIENT INSTRUCTIONS
Nausea:    Take vitamin b6 four times daily  Take unisom at night  Take reglan 1-2 tablets up to three times daily  Zofran as needed after that    Consider signing up for text messages to learn about how you and your baby are developing during pregnancy: [email or web address]      Fetal Survey Ultrasound  We will arrange for an ultrasound around 18 weeks gestation. These are done in clinic on . Many families look forward to this visit as a chance to try and determine the babies gender - however, it is an important exam to ensure that baby is developing and growing normally!    Consider childbirth education classes  Childbirth classes are a great way to learn what to expect from the remainder of pregnancy, tips for preparing and handling the stresses of labor, and learning more about your . Classes are also great for learning more about breastfeeding! Discuss options for classes with your doctor if you are interested.       [facility] Information  If you have any further concerns or wish to schedule another appointment, please call our office at [phone] during normal business hours (8-5, M-F).     For uncomplicated pregnancies, you will be seeing your doctor once a month until you are 28 weeks, then you will see your doctor twice a month. You will begin weekly visits at 36 weeks until you deliver.     If you have urgent medical questions that cannot wait, you may call [phone] at any time of day. Call your doctor if you experience any bleeding or abdominal cramping early in pregnancy.     If you have a medical emergency, please call [phone].    When to call or come in to the hospital  If you have any bleeding or leakage of fluids.   If you have lower abdominal/uterine cramping not relieved with rest and fluids.  If you have a headache not resolved with tylenol, right upper abdominal pain, or sudden onset of swelling.  You know your body best. Never hesitate to call or go to the  hospital if something doesn't feel right!  [facility]  Address: [address] [place] [address]  Phone: [phone]

## 2024-10-18 NOTE — PROGRESS NOTES
"Haleigh Caraballo is a 39 year old  female who presents to clinic for a follow up OB visit. She is currently 19w4d with an estimated date of delivery of Mar 10, 2025 via 1st trimester US. She denies headache, chest pain, shortness of breath, abdominal pain/contractions, vaginal bleeding, or abnormal discharge. Taking multivitamin daily .     New concerns today:   Nausea and vomiting  ED yesterday - united     Zofran, reglan    Something rectal for nausea.     ED twice in one month.  Once the month previous    Nausea/vomit - worst in the morning. Yellow. Sometimes some blood.     Urinalysis - UTI previously - 3-4 weeks ago infection.     Sprite,   Vitamin B6.     Nausea: Zofran and reglan  - rectal agent -     B6 - high doses - qid  Doxylamine    Crackers.      Wt Readings from Last 10 Encounters:   10/18/24 64.4 kg (142 lb 1.3 oz)   24 59.4 kg (131 lb)   24 57.2 kg (126 lb)   07/15/24 59.4 kg (131 lb)   24 59.4 kg (131 lb)   24 62.7 kg (138 lb 1.9 oz)   24 61.7 kg (136 lb)   24 61.2 kg (135 lb)   02 63.7 kg (140 lb 8 oz) (80%, Z= 0.84)*     * Growth percentiles are based on CDC (Girls, 2-20 Years) data.         OB History    Para Term  AB Living   5 3 2 1 1 4   SAB IAB Ectopic Multiple Live Births   1 0 0 1 4      # Outcome Date GA Lbr Ras/2nd Weight Sex Type Anes PTL Lv   5 Current            4 SAB 2024 6w0d    SAB      3 Term  40w0d   M    ELLA   2A   32w0d   M -SEC  Y ELLA      Birth Comments: PROM, Twin gestation, delivered via    2B   32w0d   M -SEC  Y ELLA   1 Term  40w0d   M   N ELLA       Physical exam:  /63   Pulse 93   Temp 98  F (36.7  C) (Tympanic)   Resp 20   Ht 1.635 m (5' 4.37\")   Wt 64.4 kg (142 lb 1.3 oz)   LMP 2024   SpO2 98%   BMI 24.11 kg/m    Wt Readings from Last 2 Encounters:   10/18/24 64.4 kg (142 lb 1.3 oz)   24 59.4 kg (131 lb)       General: alert, " Conjunctivae and eyelids appear normal,  Sclerae : White without injection  "female in no acute distress  Abdomen: gravid uterus appropriate for gestation age above pubic symphysis, non-tender, FHTs 140s  19-20  Extremities: no edema    Prenatal labs  Blood type: O POS  Hgb:   Hemoglobin   Date Value Ref Range Status   2024 11.5 (L) 11.7 - 15.7 g/dL Final     Pap: No results found for: \"PAP\"  Hep B, HIV, syphilis, gonorrhea, chlamydia, HIV negative  Rubella immune  UA/Ucx normal positive for GBS < 1k    Assessment/Plan:  Patient Active Problem List   Diagnosis    Marijuana abuse    Multiple gestation with malpresentation of one fetus or more    Myopic astigmatism of both eyes     delivery    Previous  delivery affecting pregnancy    Trichomoniasis       Haleigh Caraballo is a 39 year old  female at 19w4d here for initial OB visit. Prenatal course complicated by hyperemesis gravidum and lack of prenatal multivitamin use . OB history significant for  delivery of twins     Routine prenatal care   - Reviewed pre- labs. Follow up as indicated but does have anemia of pregnancy now.  -Normal genetic screening, indicated female  -Growth ultrasound at 20 weeks consistent with dates, normal but unable to fully visualize left outflow tract and will repeat ultrasound in 2 weeks  -Hyperemesis complicating pregnancy: Experiences another pregnancies up until 6 months.  Has been to the ER a few times in the last month for this.   -Vitamin B6 4 times a day   -Unisom at night   -Reglan 5 to 10 mg 3 times daily.  Recommended scheduling this to start and then tapering back as needed   -Zofran as fourth agent   -Advised to eat more frequent small snacks to not exacerbate her nausea and to keep her blood sugar up   -Closer follow-up for this, 2-week follow-up    Patient wants an epidural for pain control during labor    Follow up in 2 weeks for routine prenatal visit.     Santos Herndon MD   [facility]    Precepted with: Dr. Pina          "

## 2024-10-18 NOTE — PROGRESS NOTES
Clinic Care Coordination Contact  Follow Up Progress Note      Assessment:  The pt was recently in the ED, I called to check up on the pt, and help the pt setup a ED follow up. I called the pt,but got her vm, so I left a vm for the pt to give me a call back. The pt has a follow up today at 9:20am with .     Care Gaps:    Health Maintenance Due   Topic Date Due    ADVANCE CARE PLANNING  Never done    GLUCOSE  Never done    YEARLY PREVENTIVE VISIT  11/13/2008    HPV IMMUNIZATION (3 - 3-dose series) 07/20/2016    PAP  11/22/2019    DTAP/TDAP/TD IMMUNIZATION (5 - Td or Tdap) 05/07/2023    INFLUENZA VACCINE (1) 09/01/2024    COVID-19 Vaccine (1 - 2024-25 season) Never done

## 2024-10-21 ENCOUNTER — TELEPHONE (OUTPATIENT)
Dept: FAMILY MEDICINE | Facility: CLINIC | Age: 39
End: 2024-10-21
Payer: COMMERCIAL

## 2024-10-21 NOTE — TELEPHONE ENCOUNTER
[facility] phone call message- patient requesting results:    Test: Ultrasound    Date of test: 10/17/24    Additional Comments: Patient called requesting for ultrasound results. No annotation yet. Please call with results thank you.    OK to leave a message on voice mail? Yes    Primary language: English      needed? No    Call taken on October 21, 2024 at 11:37 AM by Tamika Canales

## 2024-10-31 ENCOUNTER — ANCILLARY PROCEDURE (OUTPATIENT)
Dept: ULTRASOUND IMAGING | Facility: CLINIC | Age: 39
End: 2024-10-31
Attending: FAMILY MEDICINE
Payer: COMMERCIAL

## 2024-10-31 DIAGNOSIS — O21.0 HYPEREMESIS GRAVIDARUM: ICD-10-CM

## 2024-10-31 PROCEDURE — 76816 OB US FOLLOW-UP PER FETUS: CPT | Mod: TC | Performed by: RADIOLOGY

## 2024-11-08 ENCOUNTER — OFFICE VISIT (OUTPATIENT)
Dept: FAMILY MEDICINE | Facility: CLINIC | Age: 39
End: 2024-11-08
Payer: COMMERCIAL

## 2024-11-08 VITALS
BODY MASS INDEX: 30.19 KG/M2 | HEIGHT: 60 IN | RESPIRATION RATE: 22 BRPM | DIASTOLIC BLOOD PRESSURE: 65 MMHG | SYSTOLIC BLOOD PRESSURE: 101 MMHG | WEIGHT: 153.8 LBS | OXYGEN SATURATION: 98 % | TEMPERATURE: 98.3 F | HEART RATE: 94 BPM

## 2024-11-08 DIAGNOSIS — K21.9 GASTROESOPHAGEAL REFLUX DISEASE WITHOUT ESOPHAGITIS: ICD-10-CM

## 2024-11-08 DIAGNOSIS — Z34.82 ENCOUNTER FOR SUPERVISION OF OTHER NORMAL PREGNANCY IN SECOND TRIMESTER: Primary | ICD-10-CM

## 2024-11-08 RX ORDER — FAMOTIDINE 20 MG/1
20 TABLET, FILM COATED ORAL 2 TIMES DAILY
Qty: 90 TABLET | Refills: 0 | Status: SHIPPED | OUTPATIENT
Start: 2024-11-08

## 2024-11-08 RX ORDER — FAMOTIDINE 20 MG/1
20 TABLET, FILM COATED ORAL 2 TIMES DAILY
Qty: 30 TABLET | Refills: 0 | Status: SHIPPED | OUTPATIENT
Start: 2024-11-08 | End: 2024-11-08

## 2024-11-08 NOTE — PROGRESS NOTES
Haleigh Caraballo is a 39 year old  female who presents to clinic for a follow up OB visit. She is currently 22w4d with an estimated date of delivery of Mar 10, 2025 via  [facility] [[email or web address]]. She denies headache, chest pain, shortness of breath, abdominal pain/contractions, vaginal bleeding, or abnormal discharge. She has felt baby move. Taking multivitamin daily.     New concerns today:     Reflux type pains.     Nausea much better - Vitamin B6. Unisom at night. Reglan 2 tablets daily.     OB History    Para Term  AB Living   5 3 2 1 1 4   SAB IAB Ectopic Multiple Live Births   1 0 0 1 4      # Outcome Date GA Lbr Ras/2nd Weight Sex Type Anes PTL Lv   5 Current            4 SAB 2024 6w0d    SAB      3 Term  40w0d   M    ELLA   2A   32w0d   M -SEC  Y ELLA      Birth Comments: PROM, Twin gestation, delivered via    2B   32w0d   M -SEC  Y ELLA   1 Term  40w0d   M   N ELLA       Physical exam:  /65   Pulse 94   Temp 98.3  F (36.8  C) (Oral)   Resp 22   Ht 1.524 m (5')   Wt 69.8 kg (153 lb 12.8 oz)   LMP 2024   SpO2 98%   Breastfeeding No   BMI 30.04 kg/m      General: alert, female in no acute distress  Abdomen: gravid uterus appropriate for gestation age at 21-22 cm above pubic symphysis, non-tender, FHTs 140s with accel to 150s.   Extremities: no edema    Assessment/Plan:  Patient Active Problem List   Diagnosis    Marijuana abuse    Multiple gestation with malpresentation of one fetus or more    Myopic astigmatism of both eyes     delivery    Previous  delivery affecting pregnancy    Trichomoniasis       Haleigh Caraballo is a 39 year old  female at 22w4d here for OB visit. Prenatal course complicated by hyperemesis gravidum and lack of prenatal multivitamin use . OB history significant for  delivery of twins.     Routine prenatal care   - Information given on gestational diabetes.   - Reviewed pre-fredi  labs. Follow up as indicated but does have anemia of pregnancy now.  -Normal genetic screening, indicated female  -Growth ultrasound at 20 weeks consistent with dates, normal but unable to fully visualize left outflow tract and will repeat ultrasound in 2 weeks  -Hyperemesis complicating pregnancy: Experiences another pregnancies up until 6 months.  Has been to the ER a few times in the last month for this.               -Vitamin B6 4 times a day               -Unisom at night               -Reglan 5 to 10 mg 3 times daily prn (only using 1 time daily right now).                 -Zofran as fourth agent               -Advised to eat more frequent small snacks to not exacerbate her nausea and to keep her blood sugar up  - CBC and RPR normal last visit   - Blood type O+. Rhogam not indicated.  - TDAP to be given at 26-36 wks  - Discussed signs and symptoms of  labor.   - Evaluate for decreased fetal movement.   If you notice a decrease in your baby's movement. As a rule, if she perceives fewer than 10 movements in two hours at rest  If your begin to experience contractions that are 5 minutes or less apart and lasting for over 45 seconds, or if contractions are becoming more painful.  If you have any bleeding or leakage of fluids.   If you have a headache not resolved with tylenol, right upper abdominal pain, or sudden onset of swelling.    Follow up in 4 weeks for routine prenatal visit.     Santos Herndon MD   [facility] Family Medicine Resident    Precepted with: Dr. Pina

## 2024-11-08 NOTE — PATIENT INSTRUCTIONS
[facility]  [email or web address]  [facility]    [address], [place] [address]    Consider signing up for text messages to learn about how you and your baby are developing during pregnancy: [email or web address]        Gestational Diabetes Screen  At your next visit, you will be screened for gestational diabetes. You will drink a sugary drink and then have your blood drawn to see how your body responds to a sugar load. This test takes about an hour to complete - please plan your schedule accordingly!    The Benefits of Breastfeeding   Breastfeeding gives your new baby the very best start. It supplies nutrition, comfort, and love. Breastfeeding is the healthiest choice for babies during the first year of life and beyond. It s healthy for Mom, too. Breastfeeding may be challenging at first. But with support, you and your baby can succeed together.     Good for Baby   Breastmilk is the ideal food for babies. It has all the nutrients your little one needs to grow healthy and strong. Here are some of the many benefits for your baby:   Breastfeeding provides contact that babies love. Frequent skin-to-skin time with Mom is calming and comforting.   Breastmilk is full of antibodies. These are substances that help your baby fight infection. Breastmilk reduces your baby's risk of respiratory illnesses, ear infections, and diarrhea.   Breastfeeding reduces your baby s risk of allergies, colds, and many other diseases.   Breastmilk changes as your baby grows, meeting her changing needs.   Breastmilk is easy for your baby to digest.   Breastmilk contains DHA, a fat that is good for your baby s developing brain and eyes.   Breastmilk decreases the risk of sudden infant death syndrome (SIDS).    Good for Mom   For many women, breastfeeding is an amazing experience. It creates a strong bond between mother and baby. Other benefits for Mom include:   You can feel proud knowing that  your baby is growing healthy and strong because of your milk.   Breastmilk is convenient. It s free, clean, and always the right temperature.   Breastfeeding burns calories. This can help you lose pregnancy weight faster.   Breastfeeding releases hormones that contract the uterus. This helps the uterus return to its normal size after childbirth.   Mothers who breastfeed have a decreased risk of ovarian and breast cancers.    There are many people who can help you learn to breastfeed. A lactation consultant is a healthcare provider specially trained to help breastfeeding moms. A lactation consultant will visit you after delivery and is available after your baby is born - if you'd like to meet with lactation prior to delivery, let your doctor know!       [facility] Information  If you have any further concerns or wish to schedule another appointment, please call our office at [phone] during normal business hours (8-5, M-F).     For uncomplicated pregnancies, you will be seeing your doctor once a month until you are 28 weeks, then you will see your doctor twice a month. You will begin weekly visits at 36 weeks until you deliver.     If you have urgent medical questions that cannot wait, you may call [phone] at any time of day.     If you have a medical emergency, please call [phone].    When to call or come in to the hospital  If you notice a decrease in your baby's movement.   If your begin to experience contractions that are 5 minutes or less apart and lasting for over 45 seconds, or if contractions are becoming more painful.  If you have any bleeding or leakage of fluids.   If you have a headache not resolved with tylenol, right upper abdominal pain, or sudden onset of swelling.  You know your body best. Never hesitate to call or go to the hospital if something doesn't feel right!  [facility]  Address: [address] [place] [address]. [place] [address]  Phone: [phone]

## 2024-11-08 NOTE — Clinical Note
[facility] & Dr. Herndon -   Do you know if this patient got the flu shot that was ordered? I don't see that it was documented and want to clarify before removing it from the visit.   Thanks!  Racheal Pina MD

## 2024-11-11 ENCOUNTER — TELEPHONE (OUTPATIENT)
Dept: FAMILY MEDICINE | Facility: CLINIC | Age: 39
End: 2024-11-11
Payer: COMMERCIAL

## 2024-11-11 NOTE — TELEPHONE ENCOUNTER
Calling patient to clarify if she was giving the FluShot or not on her visit 11/09/24 - 4pm visit with Katrina.

## 2024-11-18 ENCOUNTER — HOSPITAL ENCOUNTER (EMERGENCY)
Facility: HOSPITAL | Age: 39
Discharge: HOME | End: 2024-11-18
Attending: EMERGENCY MEDICINE | Admitting: EMERGENCY MEDICINE
Payer: COMMERCIAL

## 2024-11-18 VITALS
TEMPERATURE: 98.4 F | WEIGHT: 150 LBS | HEART RATE: 79 BPM | OXYGEN SATURATION: 99 % | SYSTOLIC BLOOD PRESSURE: 90 MMHG | DIASTOLIC BLOOD PRESSURE: 59 MMHG | BODY MASS INDEX: 25.61 KG/M2 | RESPIRATION RATE: 18 BRPM | HEIGHT: 64 IN

## 2024-11-18 DIAGNOSIS — O21.0 HYPEREMESIS GRAVIDARUM: ICD-10-CM

## 2024-11-18 LAB
ANION GAP SERPL CALCULATED.3IONS-SCNC: 10 MMOL/L (ref 7–15)
BASOPHILS # BLD AUTO: 0 10E3/UL (ref 0–0.2)
BASOPHILS NFR BLD AUTO: 0 %
BUN SERPL-MCNC: 7.2 MG/DL (ref 6–20)
CALCIUM SERPL-MCNC: 8.6 MG/DL (ref 8.8–10.4)
CHLORIDE SERPL-SCNC: 105 MMOL/L (ref 98–107)
CREAT SERPL-MCNC: 0.6 MG/DL (ref 0.51–0.95)
EGFRCR SERPLBLD CKD-EPI 2021: >90 ML/MIN/1.73M2
EOSINOPHIL # BLD AUTO: 0 10E3/UL (ref 0–0.7)
EOSINOPHIL NFR BLD AUTO: 0 %
ERYTHROCYTE [DISTWIDTH] IN BLOOD BY AUTOMATED COUNT: 13.2 % (ref 10–15)
GLUCOSE SERPL-MCNC: 83 MG/DL (ref 70–99)
HCO3 SERPL-SCNC: 24 MMOL/L (ref 22–29)
HCT VFR BLD AUTO: 34.1 % (ref 35–47)
HGB BLD-MCNC: 11.1 G/DL (ref 11.7–15.7)
HOLD SPECIMEN: NORMAL
HOLD SPECIMEN: NORMAL
IMM GRANULOCYTES # BLD: 0.1 10E3/UL
IMM GRANULOCYTES NFR BLD: 1 %
LYMPHOCYTES # BLD AUTO: 2.4 10E3/UL (ref 0.8–5.3)
LYMPHOCYTES NFR BLD AUTO: 29 %
MAGNESIUM SERPL-MCNC: 1.7 MG/DL (ref 1.7–2.3)
MCH RBC QN AUTO: 30.7 PG (ref 26.5–33)
MCHC RBC AUTO-ENTMCNC: 32.6 G/DL (ref 31.5–36.5)
MCV RBC AUTO: 94 FL (ref 78–100)
MONOCYTES # BLD AUTO: 0.5 10E3/UL (ref 0–1.3)
MONOCYTES NFR BLD AUTO: 6 %
NEUTROPHILS # BLD AUTO: 5.3 10E3/UL (ref 1.6–8.3)
NEUTROPHILS NFR BLD AUTO: 64 %
NRBC # BLD AUTO: 0 10E3/UL
NRBC BLD AUTO-RTO: 0 /100
PLATELET # BLD AUTO: 271 10E3/UL (ref 150–450)
POTASSIUM SERPL-SCNC: 3.6 MMOL/L (ref 3.4–5.3)
RBC # BLD AUTO: 3.62 10E6/UL (ref 3.8–5.2)
SODIUM SERPL-SCNC: 139 MMOL/L (ref 135–145)
WBC # BLD AUTO: 8.3 10E3/UL (ref 4–11)

## 2024-11-18 PROCEDURE — 36415 COLL VENOUS BLD VENIPUNCTURE: CPT | Performed by: EMERGENCY MEDICINE

## 2024-11-18 PROCEDURE — 82565 ASSAY OF CREATININE: CPT | Performed by: EMERGENCY MEDICINE

## 2024-11-18 PROCEDURE — 84132 ASSAY OF SERUM POTASSIUM: CPT | Performed by: EMERGENCY MEDICINE

## 2024-11-18 PROCEDURE — 83735 ASSAY OF MAGNESIUM: CPT | Performed by: EMERGENCY MEDICINE

## 2024-11-18 PROCEDURE — 96374 THER/PROPH/DIAG INJ IV PUSH: CPT

## 2024-11-18 PROCEDURE — 96361 HYDRATE IV INFUSION ADD-ON: CPT

## 2024-11-18 PROCEDURE — 96375 TX/PRO/DX INJ NEW DRUG ADDON: CPT

## 2024-11-18 PROCEDURE — 85041 AUTOMATED RBC COUNT: CPT | Performed by: EMERGENCY MEDICINE

## 2024-11-18 PROCEDURE — 85004 AUTOMATED DIFF WBC COUNT: CPT | Performed by: EMERGENCY MEDICINE

## 2024-11-18 PROCEDURE — 80048 BASIC METABOLIC PNL TOTAL CA: CPT | Performed by: EMERGENCY MEDICINE

## 2024-11-18 PROCEDURE — 99284 EMERGENCY DEPT VISIT MOD MDM: CPT | Mod: 25

## 2024-11-18 PROCEDURE — 258N000003 HC RX IP 258 OP 636: Performed by: EMERGENCY MEDICINE

## 2024-11-18 PROCEDURE — 250N000011 HC RX IP 250 OP 636: Performed by: EMERGENCY MEDICINE

## 2024-11-18 RX ORDER — METOCLOPRAMIDE HYDROCHLORIDE 5 MG/ML
10 INJECTION INTRAMUSCULAR; INTRAVENOUS ONCE
Status: COMPLETED | OUTPATIENT
Start: 2024-11-18 | End: 2024-11-18

## 2024-11-18 RX ORDER — DIPHENHYDRAMINE HYDROCHLORIDE 50 MG/ML
12.5 INJECTION INTRAMUSCULAR; INTRAVENOUS ONCE
Status: COMPLETED | OUTPATIENT
Start: 2024-11-18 | End: 2024-11-18

## 2024-11-18 RX ADMIN — DIPHENHYDRAMINE HYDROCHLORIDE 12.5 MG: 50 INJECTION, SOLUTION INTRAMUSCULAR; INTRAVENOUS at 14:21

## 2024-11-18 RX ADMIN — SODIUM CHLORIDE 1000 ML: 9 INJECTION, SOLUTION INTRAVENOUS at 14:17

## 2024-11-18 RX ADMIN — METOCLOPRAMIDE 10 MG: 5 INJECTION, SOLUTION INTRAMUSCULAR; INTRAVENOUS at 14:16

## 2024-11-18 ASSESSMENT — COLUMBIA-SUICIDE SEVERITY RATING SCALE - C-SSRS
1. IN THE PAST MONTH, HAVE YOU WISHED YOU WERE DEAD OR WISHED YOU COULD GO TO SLEEP AND NOT WAKE UP?: NO
6. HAVE YOU EVER DONE ANYTHING, STARTED TO DO ANYTHING, OR PREPARED TO DO ANYTHING TO END YOUR LIFE?: NO
2. HAVE YOU ACTUALLY HAD ANY THOUGHTS OF KILLING YOURSELF IN THE PAST MONTH?: NO

## 2024-11-18 ASSESSMENT — ACTIVITIES OF DAILY LIVING (ADL)
ADLS_ACUITY_SCORE: 0
ADLS_ACUITY_SCORE: 0

## 2024-11-18 NOTE — ED NOTES
Mother states they moved here in July and daughter is having a hard time. She reports pt has been depressed and anxious. Superficial cuts noted to left wrist. Pt denies any SI.    Pt able to eat and drink. States feeling better. Dr. Moody notified

## 2024-11-18 NOTE — ED PROVIDER NOTES
EMERGENCY DEPARTMENT ENCOUNTER      NAME: Haleigh Caraballo  AGE: 39 year old female  YOB: 1985  MRN: [id number]  EVALUATION DATE & TIME: 11/18/2024  1:34 PM    PCP: Santos Herndon    ED PROVIDER: Parisa Moody MD      Chief Complaint   Patient presents with    Vomiting         FINAL IMPRESSION:  1. Hyperemesis gravidarum          ED COURSE & MEDICAL DECISION MAKING:    Pertinent Labs & Imaging studies reviewed. (See chart for details)  39 year old female presents to the Emergency Department for evaluation of nausea and vomiting, inability to keep anything down.  Patient is a G4, P3 currently 24 weeks pregnant.  She develops lower abdominal cramping when she is vomiting, otherwise has no overt abdominal pain.  She has had no diarrhea, no abnormal vaginal bleeding.  She reports that she has rectal Phenergan, oral Reglan, oral ondansetron, oral hydroxyzine at home.  She has been using these without relief of her symptoms.  She reports that she has had to come to the ER every other month or so for symptom control.  She reports that IV Reglan is usually helpful.  Plan for liter of fluids, IV Reglan, IV diphenhydramine, reassess for improvement.  She has no abdominal tenderness on exam.  Patient is comfortable with discharge home with antiemetics and antivirals to help with the symptoms given normal laboratory studies.    2:01 PM I met with the patient, obtained history, performed an initial exam, and discussed options and plan for diagnostics and treatment here in the ED.   3:05 PM I reassessed the patient.  She is feeling improved.  She is willing to try p.o. challenge to see if she can keep things down and discharged home.  3:37 PM patient able to tolerate oral intake, will discharge home with recommendations to follow-up closely with primary care provider.    At the conclusion of the encounter I discussed the results of all of the tests and the disposition. The questions were answered. The  patient or family acknowledged understanding and was agreeable with the care plan.       Medical Decision Making  Obtained supplemental history:Supplemental history obtained?: Documented in chart  Reviewed external records: External records reviewed?: Documented in chart  Care impacted by chronic illness:Documented in Chart  Did you consider but not order tests?: Work up considered but not performed and documented in chart, if applicable  Did you interpret images independently?: Independent interpretation of ECG and images noted in documentation, when applicable.  Consultation discussion with other provider:Did you involve another provider (consultant, , pharmacy, etc.)?: No  Discharge. No recommendations on prescription strength medication(s). See documentation for any additional details.    MIPS: Not Applicable        MEDICATIONS GIVEN IN THE EMERGENCY:  Medications   metoclopramide (REGLAN) injection 10 mg (10 mg Intravenous $Given 24 1416)   diphenhydrAMINE (BENADRYL) injection 12.5 mg (12.5 mg Intravenous $Given 24 1421)   sodium chloride 0.9% BOLUS 1,000 mL (1,000 mLs Intravenous $New Bag 24 1417)       NEW PRESCRIPTIONS STARTED AT TODAY'S ER VISIT  New Prescriptions    No medications on file          =================================================================    HPI    Patient information was obtained from: patient     Use of : N/A         Haleigh Caraballo is a 39 year old female currently pregnant who presents to this ED via walk-in for evaluation of nausea and vomiting.    Patient is  currently 24 weeks pregnant and has had persistent nausea and vomiting throughout the duration of her pregnancy.  She has been prescribed an assortment of antiemetics, which she has tried without relief.  Patient notes that sometimes her symptoms become so unmanageable that she seeks evaluation in the ED, approximately once every other month throughout her pregnancy.  With her  "nausea and vomiting, she endorses lower abdominal cramping but otherwise denies any abdominal pain.  Patient also endorses some intermittent streaks of bright red blood in her emesis.    Denies abnormal vaginal bleeding, black or bright red bloody stools, or any other concerns at this time.  Patient does not take any blood thinners.      PAST MEDICAL HISTORY:  No past medical history on file.    PAST SURGICAL HISTORY:  No past surgical history on file.        CURRENT MEDICATIONS:    doxylamine (UNISOM) 25 MG TABS tablet  famotidine (PEPCID) 20 MG tablet  metoclopramide (REGLAN) 5 MG tablet  ondansetron (ZOFRAN) 8 MG tablet  pyridOXINE (VITAMIN B6) 25 MG tablet        ALLERGIES:  No Known Allergies    FAMILY HISTORY:  No family history on file.    SOCIAL HISTORY:   Social History     Socioeconomic History    Marital status: Single   Tobacco Use    Smoking status: Never     Passive exposure: Never    Smokeless tobacco: Never     Social Drivers of Health     Interpersonal Safety: Low Risk  (8/14/2024)    Interpersonal Safety     Do you feel physically and emotionally safe where you currently live?: Yes     Within the past 12 months, have you been hit, slapped, kicked or otherwise physically hurt by someone?: No     Within the past 12 months, have you been humiliated or emotionally abused in other ways by your partner or ex-partner?: No       VITALS:  /59   Pulse 92   Temp 98.4  F (36.9  C) (Oral)   Resp 16   Ht 1.626 m (5' 4\")   Wt 68 kg (150 lb)   LMP 06/03/2024   SpO2 99%   BMI 25.75 kg/m      PHYSICAL EXAM    Constitutional: Well developed, Well nourished, NAD  HENT: Normocephalic, Atraumatic, Bilateral external ears normal, Oropharynx normal, mucous membranes moist, Nose normal.   Neck- Normal range of motion, No tenderness, Supple, No stridor.  Eyes: PERRL, EOMI, Conjunctiva normal, No discharge.   Respiratory: Normal breath sounds, No respiratory distress  Cardiovascular: Normal heart rate, Regular " rhythm  GI: Bowel sounds normal, Soft, Mild diffuse abdominal tenderness.  Musculoskeletal: No edema. Good range of motion in all major joints. No tenderness to palpation or major deformities noted.   Integument: Warm, Dry, No erythema, No rash  Neurologic: Alert & oriented x 3, Normal motor function, Normal sensory function, No focal deficits noted. Normal gait.   Psychiatric: Affect normal, Judgment normal, Mood normal.      LAB:  All pertinent labs reviewed and interpreted.  Results for orders placed or performed during the hospital encounter of 11/18/24   Basic metabolic panel   Result Value Ref Range    Sodium 139 135 - 145 mmol/L    Potassium 3.6 3.4 - 5.3 mmol/L    Chloride 105 98 - 107 mmol/L    Carbon Dioxide (CO2) 24 22 - 29 mmol/L    Anion Gap 10 7 - 15 mmol/L    Urea Nitrogen 7.2 6.0 - 20.0 mg/dL    Creatinine 0.60 0.51 - 0.95 mg/dL    GFR Estimate >90 >60 mL/min/1.73m2    Calcium 8.6 (L) 8.8 - 10.4 mg/dL    Glucose 83 70 - 99 mg/dL   CBC with platelets and differential   Result Value Ref Range    WBC Count 8.3 4.0 - 11.0 10e3/uL    RBC Count 3.62 (L) 3.80 - 5.20 10e6/uL    Hemoglobin 11.1 (L) 11.7 - 15.7 g/dL    Hematocrit 34.1 (L) 35.0 - 47.0 %    MCV 94 78 - 100 fL    MCH 30.7 26.5 - 33.0 pg    MCHC 32.6 31.5 - 36.5 g/dL    RDW 13.2 10.0 - 15.0 %    Platelet Count 271 150 - 450 10e3/uL    % Neutrophils 64 %    % Lymphocytes 29 %    % Monocytes 6 %    % Eosinophils 0 %    % Basophils 0 %    % Immature Granulocytes 1 %    NRBCs per 100 WBC 0 <1 /100    Absolute Neutrophils 5.3 1.6 - 8.3 10e3/uL    Absolute Lymphocytes 2.4 0.8 - 5.3 10e3/uL    Absolute Monocytes 0.5 0.0 - 1.3 10e3/uL    Absolute Eosinophils 0.0 0.0 - 0.7 10e3/uL    Absolute Basophils 0.0 0.0 - 0.2 10e3/uL    Absolute Immature Granulocytes 0.1 <=0.4 10e3/uL    Absolute NRBCs 0.0 10e3/uL   Extra Blue Top Tube   Result Value Ref Range    Hold Specimen JIC    Extra Red Top Tube   Result Value Ref Range    Hold Specimen JIC    Result Value  Ref Range    Magnesium 1.7 1.7 - 2.3 mg/dL       RADIOLOGY:  Reviewed all pertinent imaging. Please see official radiology report.  No orders to display           I, Ro Patterson, am serving as a scribe to document services personally performed by Parisa Moody MD, based on my observation and the provider's statements to me. I, Parisa Moody MD, attest that Ro Patterson is acting in a scribe capacity, has observed my performance of the services and has documented them in accordance with my direction.    Parisa Moody MD  Emergency Medicine  [facility] EMERGENCY DEPARTMENT  [address] [place] [id number] [phone]       Parisa Moody MD  11/18/24 [id number]

## 2024-11-18 NOTE — ED TRIAGE NOTES
Patient presents here for evaluation of intractable vomiting that has occurred over the past 24 hours. She is 24 weeks gestation and has had frequent vomiting throughout the pregnancy. Zofran has not been effective recently. No vaginal bleeding or drainage.      Triage Assessment (Adult)       Row Name 11/18/24 1315          Triage Assessment    Airway WDL WDL        Respiratory WDL    Respiratory WDL WDL        Skin Circulation/Temperature WDL    Skin Circulation/Temperature WDL WDL        Cardiac WDL    Cardiac WDL WDL        Peripheral/Neurovascular WDL    Peripheral Neurovascular WDL WDL        Cognitive/Neuro/Behavioral WDL    Cognitive/Neuro/Behavioral WDL WDL

## 2024-12-04 ENCOUNTER — APPOINTMENT (OUTPATIENT)
Dept: ULTRASOUND IMAGING | Facility: HOSPITAL | Age: 39
End: 2024-12-04
Attending: EMERGENCY MEDICINE
Payer: COMMERCIAL

## 2024-12-04 ENCOUNTER — HOSPITAL ENCOUNTER (EMERGENCY)
Facility: HOSPITAL | Age: 39
Discharge: LEFT BEFORE DISCHARGE | End: 2024-12-04
Attending: EMERGENCY MEDICINE
Payer: COMMERCIAL

## 2024-12-04 VITALS
RESPIRATION RATE: 16 BRPM | WEIGHT: 154 LBS | OXYGEN SATURATION: 98 % | BODY MASS INDEX: 26.43 KG/M2 | SYSTOLIC BLOOD PRESSURE: 114 MMHG | HEART RATE: 84 BPM | TEMPERATURE: 98.5 F | DIASTOLIC BLOOD PRESSURE: 64 MMHG

## 2024-12-04 DIAGNOSIS — R10.31 RIGHT LOWER QUADRANT ABDOMINAL PAIN: ICD-10-CM

## 2024-12-04 LAB
ALBUMIN UR-MCNC: 100 MG/DL
ANION GAP SERPL CALCULATED.3IONS-SCNC: 14 MMOL/L (ref 7–15)
APPEARANCE UR: ABNORMAL
BASOPHILS # BLD AUTO: 0 10E3/UL (ref 0–0.2)
BASOPHILS NFR BLD AUTO: 0 %
BILIRUB UR QL STRIP: NEGATIVE
BUN SERPL-MCNC: 10.5 MG/DL (ref 6–20)
CALCIUM SERPL-MCNC: 8.7 MG/DL (ref 8.8–10.4)
CHLORIDE SERPL-SCNC: 100 MMOL/L (ref 98–107)
CLUE CELLS: ABNORMAL
COLOR UR AUTO: YELLOW
CREAT SERPL-MCNC: 0.72 MG/DL (ref 0.51–0.95)
EGFRCR SERPLBLD CKD-EPI 2021: >90 ML/MIN/1.73M2
EOSINOPHIL # BLD AUTO: 0 10E3/UL (ref 0–0.7)
EOSINOPHIL NFR BLD AUTO: 0 %
ERYTHROCYTE [DISTWIDTH] IN BLOOD BY AUTOMATED COUNT: 13.2 % (ref 10–15)
FLUAV RNA SPEC QL NAA+PROBE: NEGATIVE
FLUBV RNA RESP QL NAA+PROBE: NEGATIVE
GLUCOSE SERPL-MCNC: 81 MG/DL (ref 70–99)
GLUCOSE UR STRIP-MCNC: NEGATIVE MG/DL
HCO3 SERPL-SCNC: 23 MMOL/L (ref 22–29)
HCT VFR BLD AUTO: 35.4 % (ref 35–47)
HGB BLD-MCNC: 11.8 G/DL (ref 11.7–15.7)
HGB UR QL STRIP: NEGATIVE
IMM GRANULOCYTES # BLD: 0.1 10E3/UL
IMM GRANULOCYTES NFR BLD: 1 %
KETONES UR STRIP-MCNC: 100 MG/DL
LACTATE SERPL-SCNC: 1.3 MMOL/L (ref 0.7–2)
LEUKOCYTE ESTERASE UR QL STRIP: ABNORMAL
LIPASE SERPL-CCNC: 18 U/L (ref 13–60)
LYMPHOCYTES # BLD AUTO: 2 10E3/UL (ref 0.8–5.3)
LYMPHOCYTES NFR BLD AUTO: 28 %
MCH RBC QN AUTO: 31 PG (ref 26.5–33)
MCHC RBC AUTO-ENTMCNC: 33.3 G/DL (ref 31.5–36.5)
MCV RBC AUTO: 93 FL (ref 78–100)
MONOCYTES # BLD AUTO: 0.4 10E3/UL (ref 0–1.3)
MONOCYTES NFR BLD AUTO: 6 %
MUCOUS THREADS #/AREA URNS LPF: PRESENT /LPF
NEUTROPHILS # BLD AUTO: 4.8 10E3/UL (ref 1.6–8.3)
NEUTROPHILS NFR BLD AUTO: 65 %
NITRATE UR QL: NEGATIVE
NRBC # BLD AUTO: 0 10E3/UL
NRBC BLD AUTO-RTO: 0 /100
PH UR STRIP: 6.5 [PH] (ref 5–7)
PLATELET # BLD AUTO: 292 10E3/UL (ref 150–450)
POTASSIUM SERPL-SCNC: 4 MMOL/L (ref 3.4–5.3)
RBC # BLD AUTO: 3.81 10E6/UL (ref 3.8–5.2)
RBC URINE: 0 /HPF
RSV RNA SPEC NAA+PROBE: NEGATIVE
SARS-COV-2 RNA RESP QL NAA+PROBE: NEGATIVE
SODIUM SERPL-SCNC: 137 MMOL/L (ref 135–145)
SP GR UR STRIP: 1.02 (ref 1–1.03)
SQUAMOUS EPITHELIAL: 27 /HPF
TRICHOMONAS, WET PREP: ABNORMAL
UROBILINOGEN UR STRIP-MCNC: 6 MG/DL
WBC # BLD AUTO: 7.4 10E3/UL (ref 4–11)
WBC URINE: 2 /HPF
WBC'S/HIGH POWER FIELD, WET PREP: ABNORMAL
YEAST, WET PREP: ABNORMAL

## 2024-12-04 PROCEDURE — 81001 URINALYSIS AUTO W/SCOPE: CPT | Performed by: EMERGENCY MEDICINE

## 2024-12-04 PROCEDURE — 87591 N.GONORRHOEAE DNA AMP PROB: CPT | Performed by: EMERGENCY MEDICINE

## 2024-12-04 PROCEDURE — 87637 SARSCOV2&INF A&B&RSV AMP PRB: CPT | Performed by: EMERGENCY MEDICINE

## 2024-12-04 PROCEDURE — 36415 COLL VENOUS BLD VENIPUNCTURE: CPT | Performed by: EMERGENCY MEDICINE

## 2024-12-04 PROCEDURE — 99285 EMERGENCY DEPT VISIT HI MDM: CPT | Mod: 25

## 2024-12-04 PROCEDURE — 87210 SMEAR WET MOUNT SALINE/INK: CPT | Performed by: EMERGENCY MEDICINE

## 2024-12-04 PROCEDURE — 83690 ASSAY OF LIPASE: CPT | Performed by: EMERGENCY MEDICINE

## 2024-12-04 PROCEDURE — 85004 AUTOMATED DIFF WBC COUNT: CPT | Performed by: EMERGENCY MEDICINE

## 2024-12-04 PROCEDURE — 96361 HYDRATE IV INFUSION ADD-ON: CPT

## 2024-12-04 PROCEDURE — 250N000011 HC RX IP 250 OP 636: Performed by: EMERGENCY MEDICINE

## 2024-12-04 PROCEDURE — 80048 BASIC METABOLIC PNL TOTAL CA: CPT | Performed by: EMERGENCY MEDICINE

## 2024-12-04 PROCEDURE — 93976 VASCULAR STUDY: CPT

## 2024-12-04 PROCEDURE — 76705 ECHO EXAM OF ABDOMEN: CPT

## 2024-12-04 PROCEDURE — 250N000013 HC RX MED GY IP 250 OP 250 PS 637: Performed by: EMERGENCY MEDICINE

## 2024-12-04 PROCEDURE — 96374 THER/PROPH/DIAG INJ IV PUSH: CPT

## 2024-12-04 PROCEDURE — 258N000003 HC RX IP 258 OP 636: Performed by: EMERGENCY MEDICINE

## 2024-12-04 PROCEDURE — 83605 ASSAY OF LACTIC ACID: CPT | Performed by: EMERGENCY MEDICINE

## 2024-12-04 PROCEDURE — 76815 OB US LIMITED FETUS(S): CPT

## 2024-12-04 PROCEDURE — 85041 AUTOMATED RBC COUNT: CPT | Performed by: EMERGENCY MEDICINE

## 2024-12-04 PROCEDURE — 87491 CHLMYD TRACH DNA AMP PROBE: CPT | Performed by: EMERGENCY MEDICINE

## 2024-12-04 RX ORDER — ACETAMINOPHEN 325 MG/1
975 TABLET ORAL ONCE
Status: COMPLETED | OUTPATIENT
Start: 2024-12-04 | End: 2024-12-04

## 2024-12-04 RX ORDER — METOCLOPRAMIDE HYDROCHLORIDE 5 MG/ML
10 INJECTION INTRAMUSCULAR; INTRAVENOUS ONCE
Status: COMPLETED | OUTPATIENT
Start: 2024-12-04 | End: 2024-12-04

## 2024-12-04 RX ORDER — ONDANSETRON 2 MG/ML
4 INJECTION INTRAMUSCULAR; INTRAVENOUS ONCE
Status: COMPLETED | OUTPATIENT
Start: 2024-12-04 | End: 2024-12-04

## 2024-12-04 RX ADMIN — ACETAMINOPHEN 975 MG: 325 TABLET ORAL at 12:29

## 2024-12-04 RX ADMIN — METOCLOPRAMIDE 10 MG: 5 INJECTION, SOLUTION INTRAMUSCULAR; INTRAVENOUS at 13:36

## 2024-12-04 RX ADMIN — SODIUM CHLORIDE 1000 ML: 9 INJECTION, SOLUTION INTRAVENOUS at 12:28

## 2024-12-04 ASSESSMENT — ACTIVITIES OF DAILY LIVING (ADL)
ADLS_ACUITY_SCORE: 41

## 2024-12-04 NOTE — ED TRIAGE NOTES
The Patient is 26 weeks pregnant. She report 5-6 days of n/v and abdominal cramping. She denies diarrhea. The last time she vomited was 0930

## 2024-12-04 NOTE — ED PROVIDER NOTES
EMERGENCY DEPARTMENT ENCOUNTER      NAME: Haleigh Caraballo  AGE: 39 year old female  YOB: 1985  MRN: [id number]  EVALUATION DATE & TIME: No admission date for patient encounter.    PCP: Santos Herndon    ED PROVIDER: Judit Butler M.D.      Chief Complaint   Patient presents with    Nausea & Vomiting         FINAL IMPRESSION:  1. Right lower quadrant abdominal pain          ED COURSE & MEDICAL DECISION MAKING:    ED Course as of 12/04/24 1442   Wed Dec 04, 2024   1053 Pt pregnant at 26 weeks gestation here with RLQ abdominal pain, denies vaginal bleeding or discharge or urinary symptoms, no medications taken, with some nausea. She has had c-sections but no other surgeries, VS WNL and examination with RLQ tender near MCBurney's point. While this could represent round ligament pain, could also be appendicitis. Pending UA, STI screen, wet prep, LFTs/lipase, US appendix with ob ultrasound to ensure no fetal anomalies at this time detected or pathology, and if US appendix nondiagnostic, then will MRI to ensure no appendicitis. No urinary symptoms ie hematuria or flank pain to suggest kidney stone, no stool or flatus limitation to suggest SBO reassuringly, no pelvic complaints to suggest ascending infection which would be very unlikely in pregnancy at this point, patient ok with plan, given zofran for nausea and tylenol for initial mild discomfort, and will serially reassess.    1308 I spoke with [facility] to clarify we dowant US to ensure no appendicitis is present.   1313 Pt declined zofran (Which RN noted as completed) as she notes zofran doesn't typically work for her, given reglan instead   1424 Pt with negative wet prep for yeast and clue cells, normal lactic acid and CBC and BMP and lipase, viral PCR negative for flu and covid19. UA with many squames but not with WBC or RBC to suggest UTI. Pelvic US WNL and unfotunately unable to visualize appendix. Given RLQ pain, MRI appendix pending.   1438 After  initially agreeing to MRI< patient now changed her mind and notes she doesn't want to stay in [facility] any longer for the MRI, wants to leave. I did have conversation with her about how she would be leaving against medical advice, my concern is that she could have acute appendicitis, and if she does have appendicitis or other pelvic/abdominal pathology undetected without MRI completed, that could worsen and cause injury in her bdy or cause injury to her unborn child including loss of pregnancy due to injury to her own body, and if appendicitis is present she would need IV antibiotic therapy or surgery to treat appendicitis, it would not get better on its own. Patient notes she understands concern and elects to leave against medical advice, and will return to [facility] if she changes her mind and is open to evaluation for appendicitis. Patient discharged after being provided with extensive anticipatory guidance and given return precautions, importance of PMD follow-up emphasized.        Pertinent Labs & Imaging studies reviewed. (See chart for details)    Medical Decision Making  Obtained supplemental history:Supplemental history obtained?: Documented in chart  Reviewed external records: External records reviewed?: Other: Documented in chart, if applicable  Care impacted by chronic illness:Documented in Chart  Did you consider but not order tests?: Work up considered but not performed and documented in chart, if applicable  Did you interpret images independently?: Independent interpretation of ECG and images noted in documentation, when applicable.  Consultation discussion with other provider:Did you involve another provider (consultant, , pharmacy, etc.)?: No  Patient left against medical advice without completion of recommended testing to ensure safety to her and fetus.    MIPS: Not Applicable      At the conclusion of the encounter I discussed the results of all of the tests and the disposition. The questions were  answered. The patient or family acknowledged understanding and was agreeable with the care plan.     MEDICATIONS GIVEN IN THE EMERGENCY:  Medications   ondansetron (ZOFRAN) injection 4 mg (4 mg Intravenous Not Given 12/4/24 1225)   acetaminophen (TYLENOL) tablet 975 mg (975 mg Oral $Given 12/4/24 1229)   sodium chloride 0.9% BOLUS 1,000 mL (0 mLs Intravenous Stopped 12/4/24 1421)   metoclopramide (REGLAN) injection 10 mg (10 mg Intravenous $Given 12/4/24 1336)       NEW PRESCRIPTIONS STARTED AT TODAY'S ER VISIT  New Prescriptions    No medications on file          =================================================================    HPI      Haleigh Caraballo is a 39 year old female with no contributory PMHx, who presents to the ED today via walk-in with nausea and vomiting.    Patient reports she's been experiencing vomiting and RLQ abdominal pain / cramping ongoing the past 5 days. She rates this abdominal pain a 5/10 in regards to the pain level. She also has been dealing with a cough the last few days. No previous history of cramping similar to this with her prior pregnancies. This is her 5th pregnancy. She denies a prior history of complications with her abdomen like appendicitis or gallbladder issues with her previous pregnancies. This abdominal pain improves with lying down. Notes onset of abdominal pain was gradual. No dysuria or hematuria. No vaginal bleeding or greenish vaginal discharge / abnormal discharge. No concerns for STDs/STIs. She denies having any contractions. She denies a prior history of abdominal surgeries. Her son has been sick the last couple of weeks but notes he hasn't been tested for COVID. She has a history of C-sections but no other abdominal surgeries. She feels nauseated currently. No other reported complaints or concerns at this time.    REVIEW OF SYSTEMS   All other systems reviewed and are negative except as noted above in HPI.    PAST MEDICAL HISTORY:  No past medical history  on file.    PAST SURGICAL HISTORY:  No past surgical history on file.    CURRENT MEDICATIONS:    doxylamine (UNISOM) 25 MG TABS tablet  famotidine (PEPCID) 20 MG tablet  metoclopramide (REGLAN) 5 MG tablet  ondansetron (ZOFRAN) 8 MG tablet  pyridOXINE (VITAMIN B6) 25 MG tablet        ALLERGIES:  No Known Allergies    FAMILY HISTORY:  No family history on file.    SOCIAL HISTORY:   Social History     Socioeconomic History    Marital status: Single   Tobacco Use    Smoking status: Never     Passive exposure: Never    Smokeless tobacco: Never     Social Drivers of Health     Interpersonal Safety: Low Risk  (8/14/2024)    Interpersonal Safety     Do you feel physically and emotionally safe where you currently live?: Yes     Within the past 12 months, have you been hit, slapped, kicked or otherwise physically hurt by someone?: No     Within the past 12 months, have you been humiliated or emotionally abused in other ways by your partner or ex-partner?: No       VITALS:  Patient Vitals for the past 24 hrs:   BP Temp Temp src Pulse Resp SpO2 Weight   12/04/24 1430 114/64 -- -- 84 -- 98 % --   12/04/24 1415 -- -- -- 75 -- 100 % --   12/04/24 1400 114/62 -- -- 74 -- 100 % --   12/04/24 1340 115/55 -- -- -- -- -- --   12/04/24 1017 116/66 98.5  F (36.9  C) Oral 89 16 98 % 69.9 kg (154 lb)       PHYSICAL EXAM    GENERAL: Awake, alert.  In no acute distress.   HEENT: Normocephalic, atraumatic.  Pupils equal, round and reactive.  Conjunctiva normal.  EOMI.  NECK: No stridor or apparent deformity.  PULMONARY: Symmetrical breath sounds without distress.  Lungs clear to auscultation bilaterally without wheezes, rhonchi or rales.  CARDIO: Regular rate and rhythm.  No significant murmur, rub or gallop.  Radial pulses strong and symmetrical.  ABDOMINAL: Abdomen soft, non-distended and RLQ tenderness near MCBurney's point to palpation. No CVAT, no palpable hepatosplenomegaly.  EXTREMITIES: No lower extremity swelling or edema.    NEURO:  Alert and oriented to person, place and time.  Cranial nerves grossly intact.  No focal motor deficit.  PSYCH: Normal mood and affect  SKIN: No rashes      LAB:  All pertinent labs reviewed and interpreted.  Results for orders placed or performed during the hospital encounter of 12/04/24   US OB >14 Weeks Limited wo Fetal Measurement    Impression     IMPRESSION:  1.  Single, intrauterine gestation with cardiac activity is in cephalic presentation.  2.  No abnormalities are seen to explain pain.  3.  Specifically, the placenta is normal without a subplacental hemorrhage.  4.  Both ovaries are seen and normal in appearance.     Basic metabolic panel   Result Value Ref Range    Sodium 137 135 - 145 mmol/L    Potassium 4.0 3.4 - 5.3 mmol/L    Chloride 100 98 - 107 mmol/L    Carbon Dioxide (CO2) 23 22 - 29 mmol/L    Anion Gap 14 7 - 15 mmol/L    Urea Nitrogen 10.5 6.0 - 20.0 mg/dL    Creatinine 0.72 0.51 - 0.95 mg/dL    GFR Estimate >90 >60 mL/min/1.73m2    Calcium 8.7 (L) 8.8 - 10.4 mg/dL    Glucose 81 70 - 99 mg/dL   Result Value Ref Range    Lipase 18 13 - 60 U/L   Lactic acid whole blood with 1x repeat in 2 hr when >2   Result Value Ref Range    Lactic Acid, Initial 1.3 0.7 - 2.0 mmol/L   UA with Microscopic reflex to Culture    Specimen: Urine, Clean Catch   Result Value Ref Range    Color Urine Yellow Colorless, Straw, Light Yellow, Yellow    Appearance Urine Turbid (A) Clear    Glucose Urine Negative Negative mg/dL    Bilirubin Urine Negative Negative    Ketones Urine 100 (A) Negative mg/dL    Specific Gravity Urine 1.020 1.003 - 1.035    Blood Urine Negative Negative    pH Urine 6.5 5.0 - 7.0    Protein Albumin Urine 100 (A) Negative mg/dL    Urobilinogen Urine 6.0 (A) <2.0 mg/dL    Nitrite Urine Negative Negative    Leukocyte Esterase Urine 25 Jeremie/uL (A) Negative    Mucus Urine Present (A) None Seen /LPF    RBC Urine 0 <=2 /HPF    WBC Urine 2 <=5 /HPF    Squamous Epithelials Urine 27 (H) <=1 /HPF   Influenza  A/B, RSV and SARS-CoV2 PCR (COVID-19) Nose    Specimen: Nose; Swab   Result Value Ref Range    Influenza A PCR Negative Negative    Influenza B PCR Negative Negative    RSV PCR Negative Negative    SARS CoV2 PCR Negative Negative   CBC with platelets and differential   Result Value Ref Range    WBC Count 7.4 4.0 - 11.0 10e3/uL    RBC Count 3.81 3.80 - 5.20 10e6/uL    Hemoglobin 11.8 11.7 - 15.7 g/dL    Hematocrit 35.4 35.0 - 47.0 %    MCV 93 78 - 100 fL    MCH 31.0 26.5 - 33.0 pg    MCHC 33.3 31.5 - 36.5 g/dL    RDW 13.2 10.0 - 15.0 %    Platelet Count 292 150 - 450 10e3/uL    % Neutrophils 65 %    % Lymphocytes 28 %    % Monocytes 6 %    % Eosinophils 0 %    % Basophils 0 %    % Immature Granulocytes 1 %    NRBCs per 100 WBC 0 <1 /100    Absolute Neutrophils 4.8 1.6 - 8.3 10e3/uL    Absolute Lymphocytes 2.0 0.8 - 5.3 10e3/uL    Absolute Monocytes 0.4 0.0 - 1.3 10e3/uL    Absolute Eosinophils 0.0 0.0 - 0.7 10e3/uL    Absolute Basophils 0.0 0.0 - 0.2 10e3/uL    Absolute Immature Granulocytes 0.1 <=0.4 10e3/uL    Absolute NRBCs 0.0 10e3/uL   Wet prep    Specimen: Vagina; Swab   Result Value Ref Range    Trichomonas Absent Absent    Yeast Absent Absent    Clue Cells Absent Absent    WBCs/high power field 2+ (A) None       RADIOLOGY:  Reviewed all pertinent imaging. Please see official radiology report.  US OB >14 Weeks Limited wo Fetal Measurement   Final Result    IMPRESSION:   1.  Single, intrauterine gestation with cardiac activity is in cephalic presentation.   2.  No abnormalities are seen to explain pain.   3.  Specifically, the placenta is normal without a subplacental hemorrhage.   4.  Both ovaries are seen and normal in appearance.         US Appendix Only   Final Result      MR Appendix wo Contrast    (Results Pending)         EKG:    N/A        I, Princess Cooney, am serving as a scribe to document services personally performed by Dr. Judit Butler based on my observation and the provider's statements to me. I,  Judit Butler MD attest that Princess Cooney is acting in a scribe capacity, has observed my performance of the services and has documented them in accordance with my direction.      Judit Butler MD  12/04/24 6909

## 2024-12-04 NOTE — DISCHARGE INSTRUCTIONS
You are leaving the ER against medical advice without testing completed to make sure your abdominal pain is safe and without our recommended MRI test to make sure that you do not have appendicitis. If you have appendicitis or other medical issues that we don't diagnose because we didnt' get all of the recommended imaging tests, that could worsen and cause injury to you or your baby or cause you to have a stillbirth or miscarriage. If you change your mind and would like for us to do the recommended tests to make sure you and your baby are safe and not having a medical emergency, please come back to the ER.

## 2024-12-05 LAB
C TRACH DNA SPEC QL NAA+PROBE: NEGATIVE
N GONORRHOEA DNA SPEC QL NAA+PROBE: NEGATIVE

## 2024-12-06 ENCOUNTER — OFFICE VISIT (OUTPATIENT)
Dept: FAMILY MEDICINE | Facility: CLINIC | Age: 39
End: 2024-12-06
Payer: COMMERCIAL

## 2024-12-06 VITALS
SYSTOLIC BLOOD PRESSURE: 110 MMHG | WEIGHT: 158 LBS | HEART RATE: 96 BPM | TEMPERATURE: 98.1 F | HEIGHT: 64 IN | DIASTOLIC BLOOD PRESSURE: 66 MMHG | RESPIRATION RATE: 20 BRPM | OXYGEN SATURATION: 97 % | BODY MASS INDEX: 26.98 KG/M2

## 2024-12-06 DIAGNOSIS — Z12.4 CERVICAL CANCER SCREENING: Primary | ICD-10-CM

## 2024-12-06 DIAGNOSIS — Z34.02 ENCOUNTER FOR SUPERVISION OF NORMAL FIRST PREGNANCY IN SECOND TRIMESTER: ICD-10-CM

## 2024-12-06 DIAGNOSIS — O21.0 HYPEREMESIS GRAVIDARUM: ICD-10-CM

## 2024-12-06 LAB — GLUCOSE 1H P 50 G GLC PO SERPL-MCNC: 134 MG/DL (ref 70–129)

## 2024-12-06 RX ORDER — METOCLOPRAMIDE 5 MG/1
10 TABLET ORAL 3 TIMES DAILY PRN
Qty: 90 TABLET | Refills: 1 | Status: SHIPPED | OUTPATIENT
Start: 2024-12-06

## 2024-12-06 NOTE — PROGRESS NOTES
Prior to immunization administration, verified patients identity using patient s name and date of birth. Please see Immunization Activity for additional information.     Screening Questionnaire for Adult Immunization    Are you sick today?   No   Do you have allergies to medications, food, a vaccine component or latex?   No   Have you ever had a serious reaction after receiving a vaccination?   No   Do you have a long-term health problem with heart, lung, kidney, or metabolic disease (e.g., diabetes), asthma, a blood disorder, no spleen, complement component deficiency, a cochlear implant, or a spinal fluid leak?  Are you on long-term aspirin therapy?   No   Do you have cancer, leukemia, HIV/AIDS, or any other immune system problem?   No   Do you have a parent, brother, or sister with an immune system problem?   No   In the past 3 months, have you taken medications that affect  your immune system, such as prednisone, other steroids, or anticancer drugs; drugs for the treatment of rheumatoid arthritis, Crohn s disease, or psoriasis; or have you had radiation treatments?   No   Have you had a seizure, or a brain or other nervous system problem?   No   During the past year, have you received a transfusion of blood or blood    products, or been given immune (gamma) globulin or antiviral drug?   No   For women: Are you pregnant or is there a chance you could become       pregnant during the next month?   No   Have you received any vaccinations in the past 4 weeks?   No     Immunization questionnaire answers were all negative.      Patient instructed to remain in clinic for 15 minutes afterwards, and to report any adverse reactions.     Screening performed by Logan Shelton CMA on 12/6/2024 at 2:27 PM.

## 2024-12-06 NOTE — PROGRESS NOTES
"Haleigh Caraballo is a 39 year old  female who presents to clinic for a follow up OB visit. She is currently 26w4d with an estimated date of delivery of Mar 10, 2025 via  [facility]. She denies headache, chest pain, shortness of breath, abdominal pain/contractions, vaginal bleeding, or abnormal discharge. Taking multivitamin. She has felt baby move.     New concerns today:   ER visit x2 our last visit.     Reglan -     Does have reflux.     OB History    Para Term  AB Living   5 3 2 1 1 4   SAB IAB Ectopic Multiple Live Births   1 0 0 1 4      # Outcome Date GA Lbr Ras/2nd Weight Sex Type Anes PTL Lv   5 Current            4 SAB 2024 6w0d    SAB      3 Term  40w0d   M    ELLA   2A   32w0d   M -SEC  Y ELLA      Birth Comments: PROM, Twin gestation, delivered via    2B   32w0d   M -SEC  Y ELLA   1 Term  40w0d   M   N ELLA       Physical exam:  LMP 2024   /66   Pulse 96   Temp 98.1  F (36.7  C) (Oral)   Resp 20   Ht 1.626 m (5' 4\")   Wt 71.7 kg (158 lb)   LMP 2024   SpO2 97%   BMI 27.12 kg/m     General: alert, female in no acute distress  Abdomen: gravid uterus appropriate for gestation age at 27 cm above pubic symphysis, non-tender, FHTs 130s with accel  Extremities: no edema    Assessment/Plan:  Patient Active Problem List   Diagnosis    Marijuana abuse    Multiple gestation with malpresentation of one fetus or more    Myopic astigmatism of both eyes     delivery    Previous  delivery affecting pregnancy    Trichomoniasis       Haleigh Caraballo is a 39 year old  female at 26w4d here for OB visit. Prenatal course complicated by hyperemesis gravidum and lack of prenatal multivitamin use . OB history significant for  delivery of twins.       - Information given on gestational diabetes.   - Reviewed pre-fredi labs. Follow up as indicated but does have anemia of pregnancy now.  -Normal genetic " screening, indicated female  -Growth ultrasound at 20 weeks consistent with dates, repeat normal  -Hyperemesis complicating pregnancy: Experiences another pregnancies up until 6 months.  Has been to the ER a few times in the last month for this.               -Vitamin B6 4 times a day               -Unisom at night               -Reglan 5 to 10 mg 3 times daily prn (only using 1 time daily right now).                 -Zofran as fourth agent               -Advised to eat more frequent small snacks to not exacerbate her nausea and to keep her blood sugar up  - CBC and RPR normal   - Blood type O+. Rhogam not indicated.  - TDAP given 24  - Ordered 1 hour GTT  - Weight gain and fundal heights have been appropriate   - Discussed signs and symptoms of  labor.   - Evaluate for decreased fetal movement.   If you notice a decrease in your baby's movement. As a rule, if she perceives fewer than 10 movements in two hours at rest  If your begin to experience contractions that are 5 minutes or less apart and lasting for over 45 seconds, or if contractions are becoming more painful.  If you have any bleeding or leakage of fluids.   If you have a headache not resolved with tylenol, right upper abdominal pain, or sudden onset of swelling.  Labor Prep Discussion  Clinic/Hospital: [facility]  Partner Name: Don't know if he will be involved Anyone else attending birth: Don't know  Ultrasound predicts sex: Female  Childrens names/ages: galdino(19), issaic and adam (14), richmond (11)   Previous labor experiences: 2 vaginal deliveries. No issues. [place] pushed 3-4.   Pertinent History: No GDM, HTN, PreE   (GDM, HTN, PreE)  Plans for labor pain management: Wants epidural  Would like depoprovera.     Follow up in 4 weeks for routine prenatal visit.     Santos Herndon MD   [facility] Medicine Resident    Precepted with: Dr. Manzo

## 2024-12-20 ENCOUNTER — HOSPITAL ENCOUNTER (OUTPATIENT)
Facility: HOSPITAL | Age: 39
Discharge: HOME | End: 2024-12-20
Attending: FAMILY MEDICINE | Admitting: FAMILY MEDICINE
Payer: COMMERCIAL

## 2024-12-20 ENCOUNTER — HOSPITAL ENCOUNTER (EMERGENCY)
Facility: HOSPITAL | Age: 39
Discharge: STILL ADMITTED OR ADMINISTRATIVE | End: 2024-12-20
Payer: COMMERCIAL

## 2024-12-20 VITALS — SYSTOLIC BLOOD PRESSURE: 97 MMHG | TEMPERATURE: 99.3 F | RESPIRATION RATE: 18 BRPM | DIASTOLIC BLOOD PRESSURE: 61 MMHG

## 2024-12-20 DIAGNOSIS — O98.819 CANDIDIASIS OF VAGINA DURING PREGNANCY: Primary | ICD-10-CM

## 2024-12-20 DIAGNOSIS — B37.31 CANDIDIASIS OF VAGINA DURING PREGNANCY: Primary | ICD-10-CM

## 2024-12-20 LAB
ALBUMIN UR-MCNC: 100 MG/DL
ANION GAP SERPL CALCULATED.3IONS-SCNC: 17 MMOL/L (ref 7–15)
APPEARANCE UR: ABNORMAL
BACTERIA #/AREA URNS HPF: ABNORMAL /HPF
BILIRUB UR QL STRIP: NEGATIVE
BUN SERPL-MCNC: 8.8 MG/DL (ref 6–20)
CALCIUM SERPL-MCNC: 9.1 MG/DL (ref 8.8–10.4)
CHLORIDE SERPL-SCNC: 102 MMOL/L (ref 98–107)
CLUE CELLS: ABNORMAL
COLOR UR AUTO: ABNORMAL
CREAT SERPL-MCNC: 0.62 MG/DL (ref 0.51–0.95)
EGFRCR SERPLBLD CKD-EPI 2021: >90 ML/MIN/1.73M2
ERYTHROCYTE [DISTWIDTH] IN BLOOD BY AUTOMATED COUNT: 13.1 % (ref 10–15)
GLUCOSE SERPL-MCNC: 85 MG/DL (ref 70–99)
GLUCOSE UR STRIP-MCNC: NEGATIVE MG/DL
HCO3 SERPL-SCNC: 20 MMOL/L (ref 22–29)
HCT VFR BLD AUTO: 34.5 % (ref 35–47)
HGB BLD-MCNC: 11.4 G/DL (ref 11.7–15.7)
HGB UR QL STRIP: NEGATIVE
KETONES UR STRIP-MCNC: 100 MG/DL
LEUKOCYTE ESTERASE UR QL STRIP: ABNORMAL
MCH RBC QN AUTO: 31 PG (ref 26.5–33)
MCHC RBC AUTO-ENTMCNC: 33 G/DL (ref 31.5–36.5)
MCV RBC AUTO: 94 FL (ref 78–100)
MUCOUS THREADS #/AREA URNS LPF: PRESENT /LPF
NITRATE UR QL: NEGATIVE
PH UR STRIP: 7 [PH] (ref 5–7)
PLATELET # BLD AUTO: 253 10E3/UL (ref 150–450)
POTASSIUM SERPL-SCNC: 3.6 MMOL/L (ref 3.4–5.3)
RBC # BLD AUTO: 3.68 10E6/UL (ref 3.8–5.2)
RBC URINE: 3 /HPF
SODIUM SERPL-SCNC: 139 MMOL/L (ref 135–145)
SP GR UR STRIP: 1.03 (ref 1–1.03)
SQUAMOUS EPITHELIAL: 12 /HPF
TRICHOMONAS, WET PREP: ABNORMAL
UROBILINOGEN UR STRIP-MCNC: 3 MG/DL
WBC # BLD AUTO: 8.9 10E3/UL (ref 4–11)
WBC URINE: 6 /HPF
WBC'S/HIGH POWER FIELD, WET PREP: ABNORMAL
YEAST, WET PREP: PRESENT

## 2024-12-20 PROCEDURE — 85018 HEMOGLOBIN: CPT | Performed by: FAMILY MEDICINE

## 2024-12-20 PROCEDURE — 87210 SMEAR WET MOUNT SALINE/INK: CPT

## 2024-12-20 PROCEDURE — 85041 AUTOMATED RBC COUNT: CPT | Performed by: FAMILY MEDICINE

## 2024-12-20 PROCEDURE — 250N000011 HC RX IP 250 OP 636

## 2024-12-20 PROCEDURE — 87086 URINE CULTURE/COLONY COUNT: CPT

## 2024-12-20 PROCEDURE — 36415 COLL VENOUS BLD VENIPUNCTURE: CPT | Performed by: FAMILY MEDICINE

## 2024-12-20 PROCEDURE — 81003 URINALYSIS AUTO W/O SCOPE: CPT

## 2024-12-20 PROCEDURE — 999N000248 HC STATISTIC IV INSERT WITH US BY RN

## 2024-12-20 PROCEDURE — 80048 BASIC METABOLIC PNL TOTAL CA: CPT | Performed by: FAMILY MEDICINE

## 2024-12-20 PROCEDURE — 82374 ASSAY BLOOD CARBON DIOXIDE: CPT | Performed by: FAMILY MEDICINE

## 2024-12-20 PROCEDURE — 99214 OFFICE O/P EST MOD 30 MIN: CPT | Mod: GE

## 2024-12-20 PROCEDURE — 258N000003 HC RX IP 258 OP 636

## 2024-12-20 RX ORDER — METOCLOPRAMIDE 10 MG/1
10 TABLET ORAL EVERY 6 HOURS PRN
Status: DISCONTINUED | OUTPATIENT
Start: 2024-12-20 | End: 2024-12-20 | Stop reason: HOSPADM

## 2024-12-20 RX ORDER — PROCHLORPERAZINE MALEATE 10 MG
10 TABLET ORAL EVERY 6 HOURS PRN
Status: DISCONTINUED | OUTPATIENT
Start: 2024-12-20 | End: 2024-12-20 | Stop reason: HOSPADM

## 2024-12-20 RX ORDER — ONDANSETRON 4 MG/1
4 TABLET, ORALLY DISINTEGRATING ORAL EVERY 6 HOURS PRN
Status: DISCONTINUED | OUTPATIENT
Start: 2024-12-20 | End: 2024-12-20 | Stop reason: HOSPADM

## 2024-12-20 RX ORDER — CLOTRIMAZOLE 1 %
CREAM (GRAM) TOPICAL AT BEDTIME
Qty: 45 G | Refills: 0 | Status: SHIPPED | OUTPATIENT
Start: 2024-12-20 | End: 2024-12-27

## 2024-12-20 RX ORDER — ONDANSETRON 2 MG/ML
4 INJECTION INTRAMUSCULAR; INTRAVENOUS EVERY 6 HOURS PRN
Status: DISCONTINUED | OUTPATIENT
Start: 2024-12-20 | End: 2024-12-20 | Stop reason: HOSPADM

## 2024-12-20 RX ORDER — METOCLOPRAMIDE HYDROCHLORIDE 5 MG/ML
10 INJECTION INTRAMUSCULAR; INTRAVENOUS EVERY 6 HOURS PRN
Status: DISCONTINUED | OUTPATIENT
Start: 2024-12-20 | End: 2024-12-20 | Stop reason: HOSPADM

## 2024-12-20 RX ADMIN — METOCLOPRAMIDE 10 MG: 5 INJECTION, SOLUTION INTRAMUSCULAR; INTRAVENOUS at 17:06

## 2024-12-20 RX ADMIN — SODIUM CHLORIDE, POTASSIUM CHLORIDE, SODIUM LACTATE AND CALCIUM CHLORIDE 1000 ML: 600; 310; 30; 20 INJECTION, SOLUTION INTRAVENOUS at 17:02

## 2024-12-20 ASSESSMENT — ACTIVITIES OF DAILY LIVING (ADL)
ADLS_ACUITY_SCORE: 15
ADLS_ACUITY_SCORE: 41

## 2024-12-20 NOTE — PROGRESS NOTES
OBSTETRICS TRIAGE ASSESSMENT NOTE    Haleigh Caraballo is a 39 year old  at 28w4d gestation based on  1st trimester ultrasound who has presented to maternity care for further evaluation of nausea, vomiting, pelvic pressure. No bleeding, leakage of fluids, contractions. Baby is moving normally.    Patient presents to OB triage for 3-day history of nausea, vomiting, and pelvic pressure.  Patient reports intermittent vomiting for the past 3 days.  She has been unable to tolerate p.o. intake today and limited p.o. intake yesterday.  Patient also reports 3-day history of pelvic pressure.  Patient states the pressure is worse at night, and is constant.  States that it does not feel like contractions.  Denies leakage of fluid, vaginal bleeding, abdominal pain, headache, vision changes, epigastric pain, recent illness, dysuria, vaginal discharge.  Patient reports fetal movement is normal.    [place] recently? No       PRENATAL CARE  Seen by Dr. Herndon at [facility].  Last seen .  Prenatal course complicated by hyperemesis gravidarum, AMA, amd lack of prenatal multivitamin use.  OB history significant for  delivery of twins.       PAST MEDICAL HISTORY  No past medical history on file.    PAST SURGICAL HISTORY   No past surgical history on file.    MEDICATIONS    Current Facility-Administered Medications:     metoclopramide (REGLAN) tablet 10 mg, 10 mg, Oral, Q6H PRN **OR** metoclopramide (REGLAN) injection 10 mg, 10 mg, Intravenous, Q6H PRN, Omaira Conway MD, 10 mg at 24 1706    ondansetron (ZOFRAN ODT) ODT tab 4 mg, 4 mg, Oral, Q6H PRN **OR** ondansetron (ZOFRAN) injection 4 mg, 4 mg, Intravenous, Q6H PRN, Omaira Conway MD    prochlorperazine (COMPAZINE) tablet 10 mg, 10 mg, Oral, Q6H PRN **OR** prochlorperazine (COMPAZINE) injection 10 mg, 10 mg, Intravenous, Q6H PRN, Omaira Conway MD    SOCIAL HISTORY:   Social History     Socioeconomic History    Marital status: Single      Spouse name: Not on file    Number of children: Not on file    Years of education: Not on file    Highest education level: Not on file   Occupational History    Not on file   Tobacco Use    Smoking status: Never     Passive exposure: Never    Smokeless tobacco: Never   Substance and Sexual Activity    Alcohol use: Not on file    Drug use: Yes     Types: Marijuana    Sexual activity: Not on file   Other Topics Concern    Not on file   Social History Narrative    Not on file     Social Drivers of Health     Financial Resource Strain: Not on file   Food Insecurity: Not on file   Transportation Needs: Not on file   Physical Activity: Not on file   Stress: Not on file   Social Connections: Not on file   Interpersonal Safety: Low Risk  (8/14/2024)    Interpersonal Safety     Do you feel physically and emotionally safe where you currently live?: Yes     Within the past 12 months, have you been hit, slapped, kicked or otherwise physically hurt by someone?: No     Within the past 12 months, have you been humiliated or emotionally abused in other ways by your partner or ex-partner?: No   Housing Stability: Not on file       PHYSICAL EXAMINATION   BP 97/61 (BP Location: Left arm, Patient Position: Semi-Fleming's, Cuff Size: Adult Regular)   Temp 99.3  F (37.4  C) (Oral)   Resp 18   LMP 06/03/2024   Gen: appears comfortable, no acute distress  CV: regular rate and rhythm, no murmur appreciated  Lungs: clear to ausculation, good air movement throughout  Abdomen: Gravid, non-tender fundus  Extremities: no lower extremity edema  Cervix: 1/40%/-3   Membranes: are intact  FHT: Category 1 tracing; baseline 145 with moderate variability and accelerations, no decelerations  Contractions: none    LAB RESULTS  Personally reviewed.  No results found for this or any previous visit (from the past 24 hours).    ASSESSMENT:  Haleigh Caraballo is a 39 year old year old at 28w4d weeks not in labor, presenting with nausea, vomiting, and  vaginal pressure consistent with hyperemesis gravidarum & yeast infection. Patient received 10 mg Reglan & 1L LR bolus, with improvement of nausea. UA and Wet prep obtained due to pelvic pressure.  UA positive for leukocyte esterase, negative nitrates inconclusive for UTI.  Will add on urine culture.  Found to have yeast on wet prep.  Workup otherwise negative.  Cervical exam unchanged.  Patient able to tolerate p.o. intake prior to discharge.    PLAN:  Discharged to home  Urine culture pending.  Will call with results.  Close follow-up with OB provider.  Has appointment scheduled with Dr. Herndon 12/30.  Encouraged to utilize prescribed Reglan and Zofran to control nausea  Clotrimazole 1% cream at bedtime for 7 days for treatment of vaginal candidiasis in pregnancy      Omaira Conway MD PGY-1  [facility]      Precepted patient with Dr. Jaz Manzo  who agrees with the plan above.

## 2024-12-20 NOTE — PROGRESS NOTES
Data: Patient presented to [facility]: 2024  4:00 PM.  Reason for maternal/fetal assessment is nausea and vomiting and pelvic pressure. Patient reports intermittent vomiting and pelvic pressure for the past three days. Patient denies uterine contractions, leaking of vaginal fluid/rupture of membranes, vaginal bleeding, abdominal pain, headache, visual disturbances, epigastric or RUQ pain, significant edema. Patient reports fetal movement is normal. Patient is a 28w4d .  Prenatal record reviewed. Pregnancy has been complicated by advanced maternal age (>=36yo).    Vital signs wnl. Support person is not present.     Action: Verbal consent for EFM. Triage assessment completed.     Response: Patient verbalized agreement with plan. Will contact residents with update and further orders.

## 2024-12-20 NOTE — PROGRESS NOTES
"Patient denies feeling abdominal pain, cramping or contractions. Reports pelvic pressure only. FHR Category I. Patient denies diarrhea, denies being around anyone with recent illness. Reports that she has had nausea intermittently throughout pregnancy and is prescribed Zofran and Vitamin B12. Patient requests reglan now, reports \"the zofran doesn't work.\" Abdomen palpates soft with assessment. Irritability and occasional contraction noted on monitor. SVE /-3. Of note, thinning palpated at external os, plan to recheck 2 hours from initial SVE. History of  with twins at 32 weeks, reports that her water broke. IV fluids started, reglan administered. Emesis occurrence while RN at bedside. UA and wet prep collected and sent.   "

## 2024-12-21 NOTE — PROGRESS NOTES
Positive for yeast, urine sent for culture. SVE remains unchanged. Patient reports feeling better, nausea significantly decreased, denies feeling pelvic pressure now. Patient drinking sprite and keeping it down, encouraged to take a few bites of avni crackers.

## 2024-12-21 NOTE — PROGRESS NOTES
Late entry:  1910 report received from Nimco Yanez RN.  Writer sees Haleigh for 10 minutes going over discharge instructions.  Haleigh verbalizes understanding of all instructions and feels comfortable going home. 1920 Writer takes Haleigh back down to the ED via wheelchair.

## 2024-12-21 NOTE — PROGRESS NOTES
Data: Patient assessed in the [facility]place for uterine contractions and abdominal pressure . Cervix 1 cm dilated and 40% effaced. Fetal station -3. Membranes intact. Contractions are not present. See flowsheets for fetal assessment documentation.     Action: Presumed adequate fetal oxygenation documented. Discharge instructions reviewed. Patient instructed to report change in fetal movement, vaginal leaking of fluid or bleeding, abdominal pain, or any concerns related to the pregnancy to provider/clinic.      Response: Orders to discharge home per Dr. Marin. Patient verbalized understanding of education and agreement with plan. Discharged to home at 1920.

## 2024-12-22 LAB — BACTERIA UR CULT: NORMAL

## 2024-12-23 NOTE — LETTER
[facility] [address]  [place] [address]  Phone: [phone]  Fax: [phone]    January 25, 2024        Haleigh Caraballo  [address] [place] [address]          To whom it may concern:    RE: Haleigh Caraballo    Patient was seen and treated today at our clinic. She is excused from work afternoon of 1/25/24    Please contact me for questions or concerns.      Sincerely,      Santos Herndon MD         0 (no pain/absence of nonverbal indicators of pain)

## 2024-12-27 ENCOUNTER — HOSPITAL ENCOUNTER (OUTPATIENT)
Facility: HOSPITAL | Age: 39
End: 2024-12-27
Admitting: FAMILY MEDICINE
Payer: COMMERCIAL

## 2024-12-27 ENCOUNTER — HOSPITAL ENCOUNTER (OUTPATIENT)
Facility: HOSPITAL | Age: 39
Discharge: HOME | End: 2024-12-27
Attending: FAMILY MEDICINE | Admitting: FAMILY MEDICINE
Payer: COMMERCIAL

## 2024-12-27 VITALS
TEMPERATURE: 99.2 F | OXYGEN SATURATION: 100 % | SYSTOLIC BLOOD PRESSURE: 118 MMHG | RESPIRATION RATE: 18 BRPM | DIASTOLIC BLOOD PRESSURE: 59 MMHG

## 2024-12-27 DIAGNOSIS — O21.0 HYPEREMESIS GRAVIDARUM: Primary | ICD-10-CM

## 2024-12-27 PROCEDURE — 250N000013 HC RX MED GY IP 250 OP 250 PS 637

## 2024-12-27 PROCEDURE — 258N000003 HC RX IP 258 OP 636

## 2024-12-27 PROCEDURE — 99214 OFFICE O/P EST MOD 30 MIN: CPT | Mod: GE

## 2024-12-27 PROCEDURE — 250N000011 HC RX IP 250 OP 636

## 2024-12-27 RX ORDER — LIDOCAINE 40 MG/G
CREAM TOPICAL
Status: DISCONTINUED | OUTPATIENT
Start: 2024-12-27 | End: 2024-12-27 | Stop reason: HOSPADM

## 2024-12-27 RX ORDER — ONDANSETRON 2 MG/ML
4 INJECTION INTRAMUSCULAR; INTRAVENOUS EVERY 6 HOURS PRN
Status: DISCONTINUED | OUTPATIENT
Start: 2024-12-27 | End: 2024-12-27 | Stop reason: HOSPADM

## 2024-12-27 RX ORDER — METOCLOPRAMIDE 10 MG/1
10 TABLET ORAL EVERY 6 HOURS PRN
Status: DISCONTINUED | OUTPATIENT
Start: 2024-12-27 | End: 2024-12-27 | Stop reason: HOSPADM

## 2024-12-27 RX ORDER — PROCHLORPERAZINE MALEATE 10 MG
10 TABLET ORAL EVERY 6 HOURS PRN
Status: DISCONTINUED | OUTPATIENT
Start: 2024-12-27 | End: 2024-12-27 | Stop reason: HOSPADM

## 2024-12-27 RX ORDER — METOCLOPRAMIDE HYDROCHLORIDE 5 MG/ML
10 INJECTION INTRAMUSCULAR; INTRAVENOUS EVERY 6 HOURS PRN
Status: DISCONTINUED | OUTPATIENT
Start: 2024-12-27 | End: 2024-12-27 | Stop reason: HOSPADM

## 2024-12-27 RX ORDER — PREDNISONE 20 MG/1
20 TABLET ORAL DAILY
Qty: 3 TABLET | Refills: 0 | Status: SHIPPED | OUTPATIENT
Start: 2024-12-27

## 2024-12-27 RX ORDER — PYRIDOXINE HCL (VITAMIN B6) 25 MG
25 TABLET ORAL 2 TIMES DAILY PRN
Status: DISCONTINUED | OUTPATIENT
Start: 2024-12-27 | End: 2024-12-27 | Stop reason: HOSPADM

## 2024-12-27 RX ORDER — HYDROXYZINE HYDROCHLORIDE 25 MG/ML
50 INJECTION, SOLUTION INTRAMUSCULAR ONCE
Status: COMPLETED | OUTPATIENT
Start: 2024-12-27 | End: 2024-12-27

## 2024-12-27 RX ORDER — METHYLPREDNISOLONE SODIUM SUCCINATE 125 MG/2ML
125 INJECTION INTRAMUSCULAR; INTRAVENOUS ONCE
Status: COMPLETED | OUTPATIENT
Start: 2024-12-27 | End: 2024-12-27

## 2024-12-27 RX ORDER — ONDANSETRON 4 MG/1
4 TABLET, ORALLY DISINTEGRATING ORAL EVERY 6 HOURS PRN
Status: DISCONTINUED | OUTPATIENT
Start: 2024-12-27 | End: 2024-12-27 | Stop reason: HOSPADM

## 2024-12-27 RX ORDER — HYDROXYZINE HYDROCHLORIDE 50 MG/1
50 TABLET, FILM COATED ORAL ONCE
Status: COMPLETED | OUTPATIENT
Start: 2024-12-27 | End: 2024-12-27

## 2024-12-27 RX ADMIN — Medication 25 MG: at 21:02

## 2024-12-27 RX ADMIN — METOCLOPRAMIDE 10 MG: 5 INJECTION, SOLUTION INTRAMUSCULAR; INTRAVENOUS at 20:32

## 2024-12-27 RX ADMIN — METHYLPREDNISOLONE SODIUM SUCCINATE 125 MG: 125 INJECTION, POWDER, FOR SOLUTION INTRAMUSCULAR; INTRAVENOUS at 19:45

## 2024-12-27 RX ADMIN — ONDANSETRON 4 MG: 2 INJECTION INTRAMUSCULAR; INTRAVENOUS at 17:52

## 2024-12-27 RX ADMIN — SODIUM CHLORIDE 1000 ML: 9 INJECTION, SOLUTION INTRAVENOUS at 17:57

## 2024-12-27 RX ADMIN — HYDROXYZINE HYDROCHLORIDE 50 MG: 50 TABLET, FILM COATED ORAL at 21:02

## 2024-12-27 ASSESSMENT — ACTIVITIES OF DAILY LIVING (ADL)
ADLS_ACUITY_SCORE: 15

## 2024-12-27 NOTE — PROGRESS NOTES
Data: Patient presented to [facility]place: 2024  4:36 PM.  Reason for maternal/fetal assessment is nausea and vomiting, headache. Patient reports cough and headache started 2 days ago and spots in vision occurring intermittently today. Also reports nausea and vomiting has been present throughout pregnancy. She states she hasn't been able to keep much food or liquids down. Patient denies uterine contractions, leaking of vaginal fluid/rupture of membranes, vaginal bleeding, pelvic pressure, epigastric or RUQ pain, significant edema. Patient reports fetal movement is normal. Patient is a 29w4d .  Prenatal record reviewed. Pregnancy has been complicated by AMA.    Vital signs wnl except tachycardia. Support person is not present.     Action: Verbal consent for EFM. Triage assessment completed.     Response: Patient verbalized agreement with plan. Will contact Dr. Santana Arreola with update and further orders.

## 2024-12-27 NOTE — LETTER
To whom it may concern,    This letter is to inform you that Smitha Caraballo was seen here in the hospital 12/27/2024 for ongoing complications of pregnancy. She is being seen in clinic 12/31 by per primary prenatal provider at which time return to work timeline will be discussed, please excuse her from work duties until this appointment.     Sincerely,  Santana Arreola MD

## 2024-12-28 NOTE — PROGRESS NOTES
Data: Patient assessed in the [facility]place for nausea and vomiting and , headache, cough and spots in vision . IVF bolus, IV reglan, IV zofran, vitamin B6, hydroxyzine, and IV solumedrol given. Patient reports improvement in her headache and nausea and feels ready to discharge home. SVE performed per patient request: Cervix 1 cm dilated and 30% effaced. Fetal station -3. Membranes intact. Contractions are occasional, palpate mild, and uterine assessment is soft at rest. Patient denies feeling contractions. See flowsheets for fetal assessment documentation.     Action: Presumed adequate fetal oxygenation documented. Discharge instructions reviewed. Patient instructed to report change in fetal movement, vaginal leaking of fluid or bleeding, abdominal pain, or any concerns related to the pregnancy to provider/clinic.      Response: Orders to discharge home per Dr. Santana Arreola. Patient verbalized understanding of education and agreement with plan. Discharged to home at 2113.

## 2024-12-28 NOTE — PROGRESS NOTES
OBSTETRICS TRIAGE ASSESSMENT NOTE  Haleigh Caraballo is a 39 year old  at 29w4d gestation based on first trimester dating US who has presented to maternity care for further evaluation of nausea and vomiting. This has been an issue in her last two pregnancies as well as through duration of current pregnancy. Was evaluated in L&D  for similar, which at that time improved w/ IVFs and Reglan. Was discharged home w/ reglan/zofran which she has been taking consistently w/o improvement in symptoms. States she has been able to keep very little PO intake down the last three days, now having HA and spots in her vision on standing.     No bleeding, leakage of fluids, contractions. Baby is moving normally.     PRENATAL CARE  Seen by Dr. Hernandez at [facility].         PAST MEDICAL HISTORY  History reviewed. No pertinent past medical history.    PAST SURGICAL HISTORY   Past Surgical History:   Procedure Laterality Date     SECTION       SECTION       MEDICATIONS    Current Facility-Administered Medications:     lidocaine (LMX4) cream, , Topical, Q1H PRN, Tracy Davis MD    lidocaine 1 % 0.1-1 mL, 0.1-1 mL, Other, Q1H PRN, Tracy Davis MD    ondansetron (ZOFRAN) injection 4 mg, 4 mg, Intravenous, Q6H PRN, Santana Arreola MD, 4 mg at 24 1752    sodium chloride (PF) 0.9% PF flush 3 mL, 3 mL, Intracatheter, Q8H, Tracy Davis MD, 3 mL at 24 1751    sodium chloride (PF) 0.9% PF flush 3 mL, 3 mL, Intracatheter, q1 min prn, Tracy Davis MD    SOCIAL HISTORY:   Social History     Socioeconomic History    Marital status: Single     Spouse name: Not on file    Number of children: Not on file    Years of education: Not on file    Highest education level: Not on file   Occupational History    Not on file   Tobacco Use    Smoking status: Never     Passive exposure: Never    Smokeless tobacco: Never   Substance and Sexual Activity    Alcohol use: Not on file     Drug use: Yes     Types: Marijuana    Sexual activity: Not on file   Other Topics Concern    Not on file   Social History Narrative    Not on file     Social Drivers of Health     Financial Resource Strain: Not on file   Food Insecurity: Not on file   Transportation Needs: Not on file   Physical Activity: Not on file   Stress: Not on file   Social Connections: Not on file   Interpersonal Safety: Low Risk  (2024)    Interpersonal Safety     Do you feel physically and emotionally safe where you currently live?: Yes     Within the past 12 months, have you been hit, slapped, kicked or otherwise physically hurt by someone?: No     Within the past 12 months, have you been humiliated or emotionally abused in other ways by your partner or ex-partner?: No   Housing Stability: Not on file     PHYSICAL EXAMINATION   /59   Temp 99.2  F (37.3  C) (Oral)   Resp 18   LMP 2024   SpO2 100%   Gen: appears comfortable in no acute distress  CV: regular rate and rhythm without murmur  Lungs: clear to ausculation, good air movement throughout  Abdomen: Gravid, non-tender  Cervix:  deferred  Extremities: no lower extremity edema    FETAL HEART MONITORING   Category 1 strip    CONTRACTIONS  none    LAB RESULTS  Personally reviewed.  No results found for this or any previous visit (from the past 24 hours).    ASSESSMENT/PLAN:   39 year old  at 29w4d gestation presenting to labor & delivery for nausea/vomiting c/w refractory hyperemesis gravidarum. PO zofran/B6 and rectal reglan have been only partially effective. Three days now of little PO intake d/t this. Did have improvement in symptoms here w/ 1L IVFs and Zofran/B6/Vistaril. Discussed pros/cons of IV solumderol for refractory nausea/vomiting/PO intolerance. She was amenable to this. Passed 1h GTT recently, no hx of chronic or gestational diabetes.     1. 1x IV solumedrol 125 mg here tonight  2. 3d course of prednisone (20 mg daily) to taper, consider  slower/continued wean at upcoming prenatal visit 12/31  3. Work note given tonight excusing Haleigh from work through next prenatal visit 12/31  4. Pt tolerating small volume frequent PO intake prior to discharge. Comfortable w/ the above plan. Will reach out to prenatal provider Dr. Herndon to update    Options for treatment and follow-up care were reviewed with the patient and/or guardian. Pt and/or guardian engaged in the decision making process and verbalized understanding of the options discussed and agreed with the final plan.    Precepted today with: MD Santana Pepe MD PGY3  [facility]

## 2024-12-28 NOTE — PROVIDER NOTIFICATION
Dr. Santana Arreola informed of patient arrival and assessment including the following:    Reason for maternal/fetal assessment nausea and vomiting, headache , cough spots in vision. Pt reports cough and headache started 2 days ago and spots in vision occurring intermittently today. Also reports nausea and vomiting has been present throughout pregnancy. She states she hasn't been able to keep much food or liquids down. SBP's 90s-100s. Urine is dark and patient was able to void only a few ml's. Fetal status normal baseline, moderate variability, accelerations present and no decelerations. Plan per provider/orders received for zofran and IV fluid bolus. Provider to see patient.

## 2024-12-31 ENCOUNTER — TELEPHONE (OUTPATIENT)
Dept: FAMILY MEDICINE | Facility: CLINIC | Age: 39
End: 2024-12-31

## 2024-12-31 ENCOUNTER — OFFICE VISIT (OUTPATIENT)
Dept: FAMILY MEDICINE | Facility: CLINIC | Age: 39
End: 2024-12-31
Payer: COMMERCIAL

## 2024-12-31 VITALS
BODY MASS INDEX: 27.49 KG/M2 | HEIGHT: 64 IN | SYSTOLIC BLOOD PRESSURE: 102 MMHG | HEART RATE: 93 BPM | DIASTOLIC BLOOD PRESSURE: 65 MMHG | TEMPERATURE: 98.3 F | OXYGEN SATURATION: 100 % | RESPIRATION RATE: 20 BRPM | WEIGHT: 161 LBS

## 2024-12-31 DIAGNOSIS — Z34.83 ENCOUNTER FOR SUPERVISION OF OTHER NORMAL PREGNANCY IN THIRD TRIMESTER: ICD-10-CM

## 2024-12-31 DIAGNOSIS — Z12.4 CERVICAL CANCER SCREENING: Primary | ICD-10-CM

## 2024-12-31 NOTE — PROGRESS NOTES
Preceptor Attestation:   Patient seen, evaluated and discussed with the resident. I have verified the content of the note, which accurately reflects my assessment of the patient and the plan of care.  Supervising Physician:Piper Tafoya MD  [facility]

## 2024-12-31 NOTE — PROGRESS NOTES
Haleigh Caraballo is a 39 year old  female who presents to clinic for a follow up OB visit. She is currently 30w1d with an estimated date of [facility] of Mar 10, 2025 [facility]. She denies headache, chest pain, shortness of breath, vaginal bleeding, or abnormal discharge. She has felt baby move.     New concerns today: Taking multivitamin    Last day of steroid therapy.      - STEROID therapy for nausea.   Vitamin b6 twice a day    Delysm    OB History    Para Term  AB Living   5 3 2 1 1 4   SAB IAB Ectopic Multiple Live Births   1 0 0 1 4      # Outcome Date GA Lbr Ras/2nd Weight Sex Type Anes PTL Lv   5 Current            4 SAB 2024 6w0d    SAB      3 Term  40w0d   M    ELLA   2A   32w0d   M -SEC  Y ELLA      Birth Comments: PROM, Twin gestation, delivered via    2B   32w0d   M -SEC  Y ELLA   1 Term  40w0d   M   N ELLA       Physical exam:  LMP 2024     General: alert, female in no acute distress  Abdomen: gravid uterus appropriate for gestation age at 30.5 cm above pubic symphysis, non-tender, FHTs 130s with accel to 150s  Extremities: no edema    Assessment/Plan:  Patient Active Problem List   Diagnosis    Marijuana abuse    Multiple gestation with malpresentation of one fetus or more    Myopic astigmatism of both eyes     delivery    Previous  delivery affecting pregnancy    Trichomoniasis    Encounter for triage in pregnant patient       Haleigh Caraballo is a 39 year old  female at 30w1d here for initial OB visit. Prenatal course complicated by hyperemesis gravidum and lack of prenatal multivitamin use . OB history significant for  delivery of twins.     Routine prenatal care   - Weight gain and fundal heights have been appropriate  -Normal genetic screening, indicated female  -Growth ultrasound at 20 weeks consistent with dates, repeat normal  -Hyperemesis complicating pregnancy: Experiences  another pregnancies up until 6 months.  Has been to the ER a few times in the last month for this.   - S/p steroid burst therapy 12/28-12/31               -Vitamin B6 4 times a day               -Unisom at night               -Reglan 5 to 10 mg 3 times daily prn (only using 1 time daily right now).                 -Zofran as fourth agent               -Advised to eat more frequent small snacks to not exacerbate her nausea and to keep her blood sugar up  - Blood type O+. Rhogam not indicated.  - TDAP given 12/6/24  - Patient plans to breastfeed.   - Discussed post-partum contraception options.  - Discussed when to call/come in.  If you notice a decrease in your baby's movement. As a rule, if she perceives fewer than 10 movements in two hours at rest  If your begin to experience contractions that are 5 minutes or less apart and lasting for over 45 seconds, or if contractions are becoming more painful.  If you have any bleeding or leakage of fluids.   If you have a headache not resolved with tylenol, right upper abdominal pain, or sudden onset of swelling.    Follow up in 2 weeks for routine prenatal visit.     2010 c section -> 1 vaginal delivery since then    Labor Prep Discussion  Clinic/Hospital: [facility]  Partner Name: Don't know if he will be involved        Anyone else attending birth: Don't know  Ultrasound predicts sex: Female  Childrens names/ages: galdino(19), issaic and adam (14), richmond (11)   Previous labor experiences: 2 vaginal deliveries. No issues. Richmond pushed 3-4.   Pertinent History: No GDM, HTN, PreE              (GDM, HTN, PreE)  Plans for labor pain management: Wants epidural  Would like depoprovera.     Santos Herndon MD  [facility]    Precepted with: Dr. Tafoya

## 2024-12-31 NOTE — TELEPHONE ENCOUNTER
Patient called stating she received a call from clinic. Upon chart review, noted OBGYN referral was placed for central scheduling. Writer advised patient writer will fax the referral to [facility] for TOLAC consult. Patient asked for information regarding what a TOLAC is and what the appointment would cover. Writer provided education explaining reason for TOLAC consult. Haleigh verbalized understanding and is agreeable to plan. Order faxed to [facility] along with pregnancy episode, current medications, and past ultrasounds for this pregnancy.  Stuart EL

## 2024-12-31 NOTE — PATIENT INSTRUCTIONS
Preparing for the hospital  You re likely feeling anxious as your child s birth approaches. This is normal. To give yourself some peace of mind, pack a bag 3-4 weeks before your due date. Here is a list of things to remember:  Personal care items like toothbrush, hair brush, lip balm, lotion, shampoo, glasses, contacts  Nightgown, bathrobe, slippers  Several pairs of underwear  Comfortable clothes for you to wear home  Camera with new batteries  Cell phone and   Insurance information and any other paperwork needed for your hospital stay  Clothes for baby to wear home  An infant, rear-facing car seat for bringing home your baby (this is required by law)    Controlling Back Pain  As your body changes during pregnancy, your back must work in new ways. Back pain is due to many causes. Physical changes in your body can strain your back and its supporting muscles. Also, hormones (chemicals that carry messages throughout the body) increase during pregnancy. This can affect how the muscles and joints work together. All of these changes can lead to pain in the upper or lower back or pelvis. Some pregnant women have sciatica, pain caused by pressure on the sciatic nerve running down the back of the leg. Ask your healthcare provider for specific tips and exercises to help control your back pain.  Tips to Help You Rest  Good rest and sleep will help you feel better. Here are some ideas:  Ask your partner to massage your shoulders, neck, or back.  Limit the errands you do each day.  Lie down in the afternoon or after work for a few minutes.  Take a warm bath before you go to sleep.  Drink warm milk or teas without caffeine.  Avoid coffee, black tea, and cola.    Preventing Heartburn  Avoid spicy or acidic foods.   Eat small amounts more often.  Wait 2 hours after eating before lying down   Sleep with your upper body raised 6 inches.  May use Tums as needed. Talk to your doctor about other medications to try.     [facility] Information  If you have any further concerns or wish to schedule another appointment, please call our office at [phone] during normal business hours (8-5, M-F).   For uncomplicated pregnancies, you will be seeing your doctor once a month until you are 28 weeks, then you will see your doctor twice a month. You will begin weekly visits at 36 weeks until you deliver.   If you have urgent medical questions that cannot wait, you may call [phone] at any time of day.   If you have a medical emergency, please call [phone].    When to call or come in to the hospital  If you notice a decrease in your baby's movement.   If your begin to experience contractions that are 5 minutes or less apart and lasting for over 45 seconds, or if contractions are becoming more painful.  If you have any bleeding or leakage of fluids.   If you have a headache not resolved with tylenol, right upper abdominal pain, or sudden onset of swelling.  You know your body best. Never hesitate to call or go to the hospital if something doesn't feel right!  [facility]  Address: [address] [place] [address] [place] [address]  Phone: [phone]

## 2025-01-06 ENCOUNTER — NURSE TRIAGE (OUTPATIENT)
Dept: FAMILY MEDICINE | Facility: CLINIC | Age: 40
End: 2025-01-06

## 2025-01-06 DIAGNOSIS — O21.0 HYPEREMESIS GRAVIDARUM: Primary | ICD-10-CM

## 2025-01-06 RX ORDER — PROMETHAZINE HYDROCHLORIDE 12.5 MG/1
12.5-25 TABLET ORAL EVERY 6 HOURS PRN
Qty: 60 TABLET | Refills: 0 | Status: ON HOLD | OUTPATIENT
Start: 2025-01-06 | End: 2025-01-28

## 2025-01-06 NOTE — TELEPHONE ENCOUNTER
"  Reason for Disposition   Vomiting and pregnant 20 or more weeks    Protocols used: Pregnancy - Morning Sickness (Nausea and Vomiting of Pregnancy)-A-OH      Vomiting per : all day vomiting  Water: bottle of water a day, vomits a couple hours after  Last ED visit : 24  Reglan not effective, took two this AM    Contractions: have since stopped. Last contraction last night, lasted about a minute     Want to discuss induction 37w due to all the symptoms and difficulties at home with other kids and being a single mom.   Did the steroid shot and 3 day pill, helped right away but wore off and no longer effective      Writer advised will discuss with  and  for next steps. Stuart RN    Nurse Triage SBAR    Is this a 2nd Level Triage? YES, LICENSED PRACTITIONER REVIEW IS REQUIRED    Situation: Patient with ongoing hyperemsis. Had contractions last night, none currently. Contraction lasted 1 minute last night. Vomiting all day, unable to keep food or liquids down. Drinking 1 bottle of water a day    Background:  31w0d pregnant. ;  twin delivery resulting in . Patient is a single mother and going to the emergency room has been difficult. She would like to avoid the ED if possible. Previous treatment of a steroid injection and \"three day pill\" was effective but wore off quickly    Assessment: Patient needing further recommendation, likely dehydrated due to ongoing vomiting and decreased oral intake    Protocol Recommended Disposition: ADS or ED  See More Appropriate Protocol    Recommendation: Disposition and plan. Also wanting to discuss induction at 37w     Routed to provider    Does the patient meet one of the following criteria for ADS visit consideration? 16+ years old, with an [facility] PCP     TIP  Providers, please consider if this condition is appropriate for management at one of our Acute and Diagnostic Services sites.     If patient is a good candidate, please use dotphrase " <dot>triageresponse and select Refer to ADS to document.

## 2025-01-06 NOTE — TELEPHONE ENCOUNTER
Patient called in asking to speak to Dr Herndon - seeing for OB - 31 weeks-- says she is throwing up and having a small contractions-- has been to ED --they said dehydrated -- still throwing up although has been trying to drink more water... please contact her -- ph# on chart is verified

## 2025-01-06 NOTE — TELEPHONE ENCOUNTER
Nurse triage    Haleigh dealing with horrible nausea and is dehdyrated.     Stop night time unisom.     Start Phenergen up to 4 times daily as needed, 1-2 tablets.    1st line: vitamin B6 scheduled  2nd line: Reglan 5 mg TID scheduled and additional 5 mg TID prn  3rd line: Phenergen as above  4th line: Zofran prn    Recommend going to triage for fluids and IV steroids with steroid taper.

## 2025-01-06 NOTE — CONFIDENTIAL NOTE
Writer called Haleigh, informed her of recommendations given by Dr Herndon. Patient verbalized she will not start Promethazine, does not feel any further oral medication would be beneficial when shortly after taking medication orally, vomits oral intake. Currently feels somewhat better at this time.  Reiterate recommendation to go into [facility] triage if symptoms should worsen again. Haleigh is requesting to come in to have a more detailed conversation with Dr Herndon, regarding the possibility of scheduled induction at full term. An appointment has been scheduled for Wednesday 1/8/2025 at 10:00 am.  Lyubov EL

## 2025-01-08 ENCOUNTER — OFFICE VISIT (OUTPATIENT)
Dept: FAMILY MEDICINE | Facility: CLINIC | Age: 40
End: 2025-01-08
Payer: COMMERCIAL

## 2025-01-08 ENCOUNTER — ENROLLMENT (OUTPATIENT)
Dept: HOME HEALTH SERVICES | Facility: HOME HEALTH | Age: 40
End: 2025-01-08
Payer: COMMERCIAL

## 2025-01-08 VITALS
TEMPERATURE: 98.1 F | OXYGEN SATURATION: 98 % | WEIGHT: 161 LBS | BODY MASS INDEX: 27.49 KG/M2 | RESPIRATION RATE: 20 BRPM | DIASTOLIC BLOOD PRESSURE: 64 MMHG | HEIGHT: 64 IN | SYSTOLIC BLOOD PRESSURE: 105 MMHG | HEART RATE: 91 BPM

## 2025-01-08 DIAGNOSIS — O21.0 HYPEREMESIS GRAVIDARUM: ICD-10-CM

## 2025-01-08 DIAGNOSIS — Z34.83 ENCOUNTER FOR SUPERVISION OF OTHER NORMAL PREGNANCY IN THIRD TRIMESTER: ICD-10-CM

## 2025-01-08 DIAGNOSIS — Z36.89 ENCOUNTER FOR TRIAGE IN PREGNANT PATIENT: Primary | ICD-10-CM

## 2025-01-08 DIAGNOSIS — Z12.4 CERVICAL CANCER SCREENING: ICD-10-CM

## 2025-01-08 RX ORDER — ALBUTEROL SULFATE 0.83 MG/ML
2.5 SOLUTION RESPIRATORY (INHALATION)
Status: CANCELLED | OUTPATIENT
Start: 2025-01-15

## 2025-01-08 RX ORDER — PREDNISONE 5 MG/1
5 TABLET ORAL DAILY
Qty: 30 TABLET | Refills: 0 | Status: SHIPPED | OUTPATIENT
Start: 2025-01-08

## 2025-01-08 RX ORDER — MEPERIDINE HYDROCHLORIDE 25 MG/ML
25 INJECTION INTRAMUSCULAR; INTRAVENOUS; SUBCUTANEOUS
Status: CANCELLED | OUTPATIENT
Start: 2025-01-15

## 2025-01-08 RX ORDER — DIPHENHYDRAMINE HYDROCHLORIDE 50 MG/ML
25 INJECTION INTRAMUSCULAR; INTRAVENOUS
Status: CANCELLED
Start: 2025-01-15

## 2025-01-08 RX ORDER — ALBUTEROL SULFATE 90 UG/1
1-2 INHALANT RESPIRATORY (INHALATION)
Status: CANCELLED
Start: 2025-01-15

## 2025-01-08 RX ORDER — DIPHENHYDRAMINE HYDROCHLORIDE 50 MG/ML
50 INJECTION INTRAMUSCULAR; INTRAVENOUS
Status: CANCELLED
Start: 2025-01-15

## 2025-01-08 RX ORDER — PREDNISONE 20 MG/1
TABLET ORAL
Qty: 60 TABLET | Refills: 0 | Status: SHIPPED | OUTPATIENT
Start: 2025-01-08 | End: 2025-01-15

## 2025-01-08 RX ORDER — METHYLPREDNISOLONE SODIUM SUCCINATE 40 MG/ML
40 INJECTION INTRAMUSCULAR; INTRAVENOUS
Status: CANCELLED
Start: 2025-01-15

## 2025-01-08 RX ORDER — EPINEPHRINE 1 MG/ML
0.3 INJECTION, SOLUTION, CONCENTRATE INTRAVENOUS EVERY 5 MIN PRN
Status: CANCELLED | OUTPATIENT
Start: 2025-01-15

## 2025-01-08 RX ORDER — HEPARIN SODIUM,PORCINE 10 UNIT/ML
5-20 VIAL (ML) INTRAVENOUS DAILY PRN
Status: CANCELLED | OUTPATIENT
Start: 2025-01-15

## 2025-01-08 RX ORDER — HEPARIN SODIUM (PORCINE) LOCK FLUSH IV SOLN 100 UNIT/ML 100 UNIT/ML
5 SOLUTION INTRAVENOUS
Status: CANCELLED | OUTPATIENT
Start: 2025-01-15

## 2025-01-08 ASSESSMENT — PATIENT HEALTH QUESTIONNAIRE - PHQ9: SUM OF ALL RESPONSES TO PHQ QUESTIONS 1-9: 10

## 2025-01-08 NOTE — PROGRESS NOTES
Haleigh Caraballo is a 39 year old  female who presents to clinic for a follow up OB visit. She is currently 31w2d with an estimated date of delivery of Mar 10, 2025 via 1st trimester US. She denies headache, chest pain, shortness of breath, abdominal pain/contractions, vaginal bleeding, or abnormal discharge. She has felt baby move.     New concerns today: one contraction.     Phenergen seemed to work - taking 1-2 - take 2 tablets every 6 hours if needed    After intravenous therapy, we use an oral prednisone taper regimen of 40 mg oral prednisone per day for one day, followed by 20 mg per day for three days, followed by 10 mg per day for three days, and then 5 mg per day for seven days.       PATIENT HEALTH QUESTIONNAIRE-9 (PHQ - 9)    Over the last 2 weeks, how often have you been bothered by any of the following problems?    1. Little interest or pleasure in doing things -  Nearly every day   2. Feeling down, depressed, or hopeless -  More than half the days   3. Trouble falling or staying asleep, or sleeping too much - More than half the days   4. Feeling tired or having little energy -  Nearly every day   5. Poor appetite or overeating -  Not at all   6. Feeling bad about yourself - or that you are a failure or have let yourself or your family down -  Not at all   7. Trouble concentrating on things, such as reading the newspaper or watching television - Not at all   8. Moving or speaking so slowly that other people could have noticed? Or the opposite - being so fidgety or restless that you have been moving around a lot more than usual Not at all   9. Thoughts that you would be better off dead or of hurting  yourself in some way Not at all   Total Score: 10     If you checked off any problems, how difficult have these problems made it for you to do your work, take care of things at home, or get along with other people? Very difficult    Developed by Mine Ramirez, Nagi Vo  Ashley and colleagues, with an educational cassie from [employer] No permission required to reproduce, translate, display or distribute. permission required to reproduce, translate, display or distribute.      OB History    Para Term  AB Living   5 3 2 1 1 4   SAB IAB Ectopic Multiple Live Births   1 0 0 1 4      # Outcome Date GA Lbr Ras/2nd Weight Sex Type Anes PTL Lv   5 Current            4 SAB 2024 6w0d    SAB      3 Term  40w0d   M    ELLA   2A   32w0d   M -SEC  Y ELLA      Birth Comments: PROM, Twin gestation, delivered via    2B   32w0d   M -SEC  Y ELLA   1 Term  40w0d   M   N ELLA       Physical exam:  LMP 2024     General: alert, female in no acute distress  Abdomen: gravid uterus appropriate for gestation age at 32 cm above pubic symphysis, non-tender, FHTs 140s  Extremities: no edema    Assessment/Plan:  Patient Active Problem List   Diagnosis    Marijuana abuse    Multiple gestation with malpresentation of one fetus or more    Myopic astigmatism of both eyes     delivery    Previous  delivery affecting pregnancy    Trichomoniasis    Encounter for triage in pregnant patient    Hyperemesis gravidarum       Haleigh Caraballo is a 39 year old  female at 31w2d here for OB visit. Prenatal course complicated by hyperemesis gravidum and lack of prenatal multivitamin use . OB history significant for  delivery of twins.     PHQ9 elevated to 10. No self harm or SI. Does not want to speak with mental health or have medications. Is just very tired and frustrated with this preganncy.     Routine prenatal care   - Weight gain and fundal heights have been appropriate  -Normal genetic screening, indicated female  -Growth ultrasound at 20 weeks consistent with dates, repeat normal  -Hyperemesis complicating pregnancy: Experiences another pregnancies up until 6 months.  Has been to the ER a few times in the last month for  this.   - Now only on Phenergen q6h prn   - sent script for steroid therapy burst to be started with nasuea              - S/p steroid burst therapy -   -Patient no longer wants to take Vitamin B6, reglan or zofran   - setup home infusions to help patient avoid R/hospital                          -Advised to eat more frequent small snacks to not exacerbate her nausea and to keep her blood sugar up  - Blood type O+. Rhogam not indicated.  - TDAP given 24  - Patient plans to breastfeed.   - Discussed post-partum contraception options.  - Discussed when to call/come in.  - Post-partum contraception plans: wants depo shot  - Reviewed  care and baby-friendly practices done at hospital after delivery.  - Discussed pre-term labor signs and symptoms and when to call/come in.   If you notice a decrease in your baby's movement. As a rule, if she perceives fewer than 10 movements in two hours at rest  If your begin to experience contractions that are 5 minutes or less apart and lasting for over 45 seconds, or if contractions are becoming more painful.  If you have any bleeding or leakage of fluids.   If you have a headache not resolved with tylenol, right upper abdominal pain, or sudden onset of swelling.    Follow up in 2 weeks for routine prenatal visit.     Labor Prep Discussion  Clinic/Hospital: [facility]  Partner Name: Don't know if he will be involved        Anyone else attending birth: Don't know  Ultrasound predicts sex: Female  Childrens names/ages: galdino(19), issaic and adam (14), pako (11)   Previous labor experiences: 2 vaginal deliveries. No issues. Pako pushed 3-4.   Pertinent History: No GDM, HTN, PreE              (GDM, HTN, PreE)  Plans for labor pain management: Wants epidural  Would like depoprovera.     Tdap: 24  Flu: no  COVID: no  Hep B: non reactive    GBS: not obtained yet    Post Partum   PP contraception: Depoprovera and then tubal ligation  Feeding preference:  Breastfeeding    Baby  Peds provider:  Santos Herndon MD   Circumcision: Female  Baby Meds: did not discuss  Siblings required phototherapy: did not discuss      Santos Herndon MD   [facility] Resident    Precepted with: Dr. Lorenzo

## 2025-01-08 NOTE — PATIENT INSTRUCTIONS
40 mg oral prednisone per day for one day, followed by 20 mg per day for three days, followed by 10 mg per day for three days, and then 5 mg per day for seven days.     Preparing for the hospital  You re likely feeling anxious as your child s birth approaches. This is normal. To give yourself some peace of mind, pack a bag 3-4 weeks before your due date. Here is a list of things to remember:  Personal care items like toothbrush, hair brush, lip balm, lotion, shampoo, glasses, contacts  Nightgown, bathrobe, slippers  Several pairs of underwear  Comfortable clothes for you to wear home  Camera with new batteries  Cell phone and   Insurance information and any other paperwork needed for your hospital stay  Clothes for baby to wear home  An infant, rear-facing car seat for bringing home your baby (this is required by law)    Controlling Back Pain  As your body changes during pregnancy, your back must work in new ways. Back pain is due to many causes. Physical changes in your body can strain your back and its supporting muscles. Also, hormones (chemicals that carry messages throughout the body) increase during pregnancy. This can affect how the muscles and joints work together. All of these changes can lead to pain in the upper or lower back or pelvis. Some pregnant women have sciatica, pain caused by pressure on the sciatic nerve running down the back of the leg. Ask your healthcare provider for specific tips and exercises to help control your back pain.  Tips to Help You Rest  Good rest and sleep will help you feel better. Here are some ideas:  Ask your partner to massage your shoulders, neck, or back.  Limit the errands you do each day.  Lie down in the afternoon or after work for a few minutes.  Take a warm bath before you go to sleep.  Drink warm milk or teas without caffeine.  Avoid coffee, black tea, and cola.    Preventing Heartburn  Avoid spicy or acidic foods.   Eat small amounts more often.  Wait 2  hours after eating before lying down   Sleep with your upper body raised 6 inches.  May use Tums as needed. Talk to your doctor about other medications to try.     [facility] Information  If you have any further concerns or wish to schedule another appointment, please call our office at [phone] during normal business hours (8-5, M-F).   For uncomplicated pregnancies, you will be seeing your doctor once a month until you are 28 weeks, then you will see your doctor twice a month. You will begin weekly visits at 36 weeks until you deliver.   If you have urgent medical questions that cannot wait, you may call [phone] at any time of day.   If you have a medical emergency, please call [phone].    When to call or come in to the hospital  If you notice a decrease in your baby's movement.   If your begin to experience contractions that are 5 minutes or less apart and lasting for over 45 seconds, or if contractions are becoming more painful.  If you have any bleeding or leakage of fluids.   If you have a headache not resolved with tylenol, right upper abdominal pain, or sudden onset of swelling.  You know your body best. Never hesitate to call or go to the hospital if something doesn't feel right!  [facility]  Address: [address] [place] [address]. [place] [address]  Phone: [phone]

## 2025-01-08 NOTE — PROGRESS NOTES
Preceptor Attestation:  Patient's case reviewed and discussed with Santos Herndon MD resident and I evaluated the patient. I agree with written assessment and plan of care.  Supervising Physician:  Zacarias Lorenzo MD, MD [facility]

## 2025-01-09 ENCOUNTER — HOME INFUSION (OUTPATIENT)
Dept: HOME HEALTH SERVICES | Facility: HOME HEALTH | Age: 40
End: 2025-01-09
Payer: COMMERCIAL

## 2025-01-09 DIAGNOSIS — O21.0 HYPEREMESIS GRAVIDARUM: Primary | ICD-10-CM

## 2025-01-09 RX ORDER — HEPARIN SODIUM,PORCINE 10 UNIT/ML
5-20 VIAL (ML) INTRAVENOUS DAILY PRN
OUTPATIENT
Start: 2025-01-15

## 2025-01-09 RX ORDER — MEPERIDINE HYDROCHLORIDE 25 MG/ML
25 INJECTION INTRAMUSCULAR; INTRAVENOUS; SUBCUTANEOUS
OUTPATIENT
Start: 2025-01-15

## 2025-01-09 RX ORDER — ALBUTEROL SULFATE 0.83 MG/ML
2.5 SOLUTION RESPIRATORY (INHALATION)
OUTPATIENT
Start: 2025-01-15

## 2025-01-09 RX ORDER — DIPHENHYDRAMINE HYDROCHLORIDE 50 MG/ML
50 INJECTION INTRAMUSCULAR; INTRAVENOUS
Start: 2025-01-15

## 2025-01-09 RX ORDER — METHYLPREDNISOLONE SODIUM SUCCINATE 40 MG/ML
40 INJECTION INTRAMUSCULAR; INTRAVENOUS
Start: 2025-01-15

## 2025-01-09 RX ORDER — ALBUTEROL SULFATE 90 UG/1
1-2 INHALANT RESPIRATORY (INHALATION)
Start: 2025-01-15

## 2025-01-09 RX ORDER — HEPARIN SODIUM (PORCINE) LOCK FLUSH IV SOLN 100 UNIT/ML 100 UNIT/ML
5 SOLUTION INTRAVENOUS
OUTPATIENT
Start: 2025-01-15

## 2025-01-09 RX ORDER — EPINEPHRINE 1 MG/ML
0.3 INJECTION, SOLUTION, CONCENTRATE INTRAVENOUS EVERY 5 MIN PRN
OUTPATIENT
Start: 2025-01-15

## 2025-01-09 RX ORDER — DIPHENHYDRAMINE HYDROCHLORIDE 50 MG/ML
25 INJECTION INTRAMUSCULAR; INTRAVENOUS
Start: 2025-01-15

## 2025-01-10 ENCOUNTER — HOME INFUSION BILLING (OUTPATIENT)
Dept: HOME HEALTH SERVICES | Facility: HOME HEALTH | Age: 40
End: 2025-01-10
Payer: COMMERCIAL

## 2025-01-10 PROCEDURE — S9374 HIT HYDRA 1 LITER DIEM: HCPCS

## 2025-01-10 PROCEDURE — A4452 WATERPROOF TAPE: HCPCS

## 2025-01-10 PROCEDURE — A4245 ALCOHOL WIPES PER BOX: HCPCS

## 2025-01-11 ENCOUNTER — HOSPITAL ENCOUNTER (OUTPATIENT)
Facility: HOSPITAL | Age: 40
End: 2025-01-11
Admitting: FAMILY MEDICINE
Payer: COMMERCIAL

## 2025-01-11 ENCOUNTER — HOSPITAL ENCOUNTER (OUTPATIENT)
Facility: HOSPITAL | Age: 40
Discharge: HOME | End: 2025-01-11
Attending: FAMILY MEDICINE | Admitting: FAMILY MEDICINE
Payer: COMMERCIAL

## 2025-01-11 ENCOUNTER — TELEPHONE (OUTPATIENT)
Dept: HOME HEALTH SERVICES | Facility: HOME HEALTH | Age: 40
End: 2025-01-11
Payer: COMMERCIAL

## 2025-01-11 VITALS
TEMPERATURE: 98.3 F | DIASTOLIC BLOOD PRESSURE: 67 MMHG | BODY MASS INDEX: 27.49 KG/M2 | SYSTOLIC BLOOD PRESSURE: 131 MMHG | WEIGHT: 161 LBS | HEIGHT: 64 IN | RESPIRATION RATE: 18 BRPM

## 2025-01-11 DIAGNOSIS — O21.0 HYPEREMESIS GRAVIDARUM: ICD-10-CM

## 2025-01-11 PROBLEM — Z36.89 ENCOUNTER FOR TRIAGE IN PREGNANT PATIENT: Status: ACTIVE | Noted: 2024-12-27

## 2025-01-11 PROCEDURE — S9374 HIT HYDRA 1 LITER DIEM: HCPCS

## 2025-01-11 PROCEDURE — G0463 HOSPITAL OUTPT CLINIC VISIT: HCPCS

## 2025-01-11 RX ORDER — PROMETHAZINE HYDROCHLORIDE 12.5 MG/1
12.5 TABLET ORAL
Status: DISCONTINUED | OUTPATIENT
Start: 2025-01-11 | End: 2025-01-11 | Stop reason: HOSPADM

## 2025-01-11 RX ORDER — LIDOCAINE 40 MG/G
CREAM TOPICAL
Status: DISCONTINUED | OUTPATIENT
Start: 2025-01-11 | End: 2025-01-11 | Stop reason: HOSPADM

## 2025-01-11 ASSESSMENT — ACTIVITIES OF DAILY LIVING (ADL)
ADLS_ACUITY_SCORE: 41

## 2025-01-11 NOTE — TELEPHONE ENCOUNTER
Triage Note/Care Coordination    Identify the person you spoke with and their relationship to the patient: patient    Reason for the call: Other: Pt would like to reschedule SOC to 1/12/25 instead of 1/13/25      Other  Interventions/Recommendation/Comments: Funmilayo from staffing updated on day change. Epic updated to SOC 1/12/25    Outcomes:     What is the plan for ongoing care of this patient: Problem resolved, patient will remain in home    Was there harm, averted harm, or unexpected death of the patient? No :   Does the patient/caregiver verbalize agreement with the plan? YES        Follow-Up Communication    I clinical team

## 2025-01-11 NOTE — PROGRESS NOTES
SVE as noted-unchanged since last visit in hospital in December. No vaginal bleeding, discharge or leaking of fluid. Reports +fetal movement now. Repositioned to right side then started sneezing and coughing followed by a series of emesis of clear fluid and phlegm. Settled back on right side. Requesting dose of phenergan since she has not taken it at home today. Awaiting return phone call from Dr Forde for orders and visit from faculty provider, Dr Doe.

## 2025-01-11 NOTE — PROGRESS NOTES
Discharge orders received from Dr Forde. PRN dose of phenergan declined by Haleigh, stating she will take it at home since she is being discharged now. Plan to follow up in clinic this week with new provider at [facility]. Reviewed discharge AVSHaleigh verbalized understanding of instructions. Discharged to home ambulatory.

## 2025-01-11 NOTE — DISCHARGE INSTRUCTIONS
Learning About When to Call Your Doctor During Pregnancy (After 20 Weeks)  Overview  It's common to have concerns about what might be a problem when you're pregnant. Most pregnancies don't have any serious problems. But it's still important to know when to call your doctor if you have certain symptoms or signs of labor.  These are general suggestions. Your doctor may give you some more information about when to call.  When to call your doctor (after 20 weeks)  Call 911  anytime you think you may need emergency care. For example, call if:  You have severe vaginal bleeding. This means you are soaking through a pad each hour for 2 or more hours.  You have sudden, severe pain in your belly.  You have chest pain, are short of breath, or cough up blood.  You passed out (lost consciousness).  You have a seizure.  You see or feel the umbilical cord.  You think you are about to deliver your baby and can't make it safely to the hospital or birthing center.  Call your doctor now or seek immediate medical care if:  You have vaginal bleeding.  You have belly pain.  You have a fever.  You are dizzy or lightheaded, or you feel like you may faint.  You have signs of a blood clot in your leg (called a deep vein thrombosis), such as:  Pain in the calf, back of the knee, thigh, or groin.  Swelling in your leg or groin.  A color change on the leg or groin. The skin may be reddish or purplish, depending on your usual skin color.  You have symptoms of preeclampsia, such as:  Sudden swelling of your face, hands, or feet.  New vision problems (such as dimness, blurring, or seeing spots).  A severe headache.  You have a sudden release of fluid from your vagina. (You think your water broke.)  You've been having regular contractions for an hour. This means that you've had at least 6 contractions within 1 hour, even after you change your position and drink fluids.  You notice that your baby has stopped moving or is moving less than  "normal.  You have signs of heart failure, such as:  New or increased shortness of breath.  New or worse swelling in your legs, ankles, or feet.  Sudden weight gain, such as more than 2 to 3 pounds in a day or 5 pounds in a week.  Feeling so tired or weak that you cannot do your usual activities.  You have symptoms of a urinary tract infection. These may include:  Pain or burning when you urinate.  A frequent need to urinate without being able to pass much urine.  Pain in the flank, which is just below the rib cage and above the waist on either side of the back.  Blood in your urine.  Watch closely for changes in your health, and be sure to contact your doctor if:  You have vaginal discharge that smells bad.  You feel sad, anxious, or hopeless for more than a few days.  You have skin changes, such as a rash, itching, or a yellow color to your skin.  You have other concerns about your pregnancy.      Follow-up care is a key part of your treatment and safety. Be sure to make and go to all appointments, and call your doctor if you are having problems. It's also a good idea to know your test results and keep a list of the medicines you take. Follow up in clinic at [facility] this week.   Where can you learn more?  Go to https://[email or web address].net/patiented  Enter N531 in the search box to learn more about \"Learning About When to Call Your Doctor During Pregnancy (After 20 Weeks).\"  Current as of: April 30, 2024  Content Version: 14.3    2024 [employer].   Care instructions adapted under license by your healthcare professional. If you have questions about a medical condition or this instruction, always ask your healthcare professional. [employer] disclaims any warranty or liability for your use of this information.        "

## 2025-01-11 NOTE — PROGRESS NOTES
SVE unchanged from December visit, no leaking of fluid, vaginal bleeding or discharge. Awaiting faculty visit from Dr Doe. Haleigh repositioned to right side, sneezed and began coughing then felt warm and nauseous, followed by emesis of clear fluids and phlegm. Settled back on to right side and fetal monitors adjusted.

## 2025-01-11 NOTE — PROGRESS NOTES
"OB Triage Note   Haleigh Caraballo is a 39 year old  currently at 31w5d.  She is presenting for decreased fetal movement.  Also expressing frustration about ongoing hyperemesis management.     In terms of decreased fetal movement: Patient reports multiple days of decreased fetal movement.  She states that typical interventions such as walking and eating have not been working like they previously did.  She does report that the day prior to this visit she had \"rectal pressure\" without ability to have bowel movement, but this is since resolved.  She has not had any vaginal bleeding, leakage of fluids.  Patient placed on monitor.  Initially lacking excels, however repositioned to right side and resulted in category 1 strip.  Reassured patient.    In terms of hyperemesis: Patient has had notable hyperemesis throughout this pregnancy.  She has required multiple emergency department visits for IV fluids.  Per chart review primary OB provider has tried use of vitamin B6, Reglan, Zofran.  Recently prescribed a steroid course, however the patient does not feel comfortable using this and has many questions about possible side effects.  She has been primarily using Phenergan which has largely improved but not resolved symptoms.  Primary OB provider assisted in setting up home infusions which the patient plans to utilize, however did not need at home infusion most recent visit as she was not feeling ill.  Does express some frustration that this option was not present to her earlier in her prenatal course.  Reassuringly, patient tells me she is able to keep food and water down.    Other concerns: Patient is certain she would like a tubal ligation following this delivery, frustrated that she has not yet been referred to OB.  Patient would prefer a female provider - would like message sent to RN coordinators to assist in scheduling this.  Is concerned about baby's size based on fundal height reported at last clinic visit; " "would be interested in repeat growth ultrasound.  Understands these concerns can be discussed at outpatient clinic follow-up.       PAST MEDICAL HISTORY  History reviewed. No pertinent past medical history.    PAST SURGICAL HISTORY   Past Surgical History:   Procedure Laterality Date     SECTION       SECTION       MEDICATIONS  No current facility-administered medications for this encounter.    Current Outpatient Medications:     metoclopramide (REGLAN) 5 MG tablet, Take 2 tablets (10 mg) by mouth 3 times daily as needed (nausea)., Disp: 90 tablet, Rfl: 1    ondansetron (ZOFRAN) 8 MG tablet, Take 1 tablet (8 mg) by mouth every 8 hours as needed for nausea, Disp: 90 tablet, Rfl: 2    promethazine (PHENERGAN) 12.5 MG tablet, Take 1-2 tablets (12.5-25 mg) by mouth every 6 hours as needed for nausea., Disp: 60 tablet, Rfl: 0    doxylamine (UNISOM) 25 MG TABS tablet, Take 1 tablet (25 mg) by mouth at bedtime., Disp: 30 tablet, Rfl: 3    Emergency Supply Kit, PIV,, Patient use for emergency only. Contents: 3 sodium chloride 0.9% flushes, 1 IV start kit, 1 microclave ext set 14\", 1 each IV Cath 22 G/1\" and 24G/3/4\", 6 alcohol prep pads, 4 nitrile gloves (med). Call [phone] to reorder., Disp: 517504 kit, Rfl: 0    sodium chloride 0.9 % in 500 mL via gravity infusion, Infuse into the vein three times a week as needed. Infuse 1 bag(s) (500 mL). Each bag to infuse over 1-2 hours. Give at least 2 days apart., Disp: 696712 mL, Rfl: 0    sodium chloride, PF, 0.9% PF flush, Inject 10 mLs into the vein as needed for line flush. Flush IV before and after medication administration as directed and/or at least every 12 hours., Disp: 915652 mL, Rfl: 0    SOCIAL HISTORY:   Social History     Socioeconomic History    Marital status: Single     Spouse name: Not on file    Number of children: Not on file    Years of education: Not on file    Highest education level: Not on file   Occupational History    Not on file " "  Tobacco Use    Smoking status: Never     Passive exposure: Never    Smokeless tobacco: Never   Substance and Sexual Activity    Alcohol use: Not Currently    Drug use: Not Currently     Types: Marijuana     Comment: stopped taking when she learned she was pregnant    Sexual activity: Not Currently     Birth control/protection: None   Other Topics Concern    Not on file   Social History Narrative    Not on file     Social Drivers of Health     Financial Resource Strain: Not on file   Food Insecurity: Not on file   Transportation Needs: Not on file   Physical Activity: Not on file   Stress: Not on file   Social Connections: Not on file   Interpersonal Safety: Low Risk  (2024)    Interpersonal Safety     Do you feel physically and emotionally safe where you currently live?: Yes     Within the past 12 months, have you been hit, slapped, kicked or otherwise physically hurt by someone?: No     Within the past 12 months, have you been humiliated or emotionally abused in other ways by your partner or ex-partner?: No   Housing Stability: Not on file     PHYSICAL EXAMINATION   /67 (BP Location: Right arm, Patient Position: Semi-Fleming's, Cuff Size: Adult Regular)   Temp 98.3  F (36.8  C) (Oral)   Resp 18   Ht 1.626 m (5' 4\")   Wt 73 kg (161 lb)   LMP 2024   BMI 27.64 kg/m    Gen: appears comfortable in no acute distress  CV: well perfused, normotensive   Lungs: no respiratory distress   Abdomen: Gravid, non-tender  Cervix:  per nursing - 0.5/40/-3   Extremities: no lower extremity edema    FETAL HEART MONITORING   Category I strip after re-positioning     CONTRACTIONS  Mild, soft     LAB RESULTS  Personally reviewed.  No results found for this or any previous visit (from the past 24 hours).    ASSESSMENT/PLAN:   39 year old  at 31w5d gestation presenting to labor & delivery for the followin. Decreased Fetal Movement   - Reassuring category I strip  - Could consider kick counts resource " to provide to relief to patient at PCP follow up     2. Hyperemesis    - Currently using Phenergan with improvement to symptoms    - Has home infusions organized (through expressing frustration this was not offered sooner)    - Clear she would like to avoid steroids    - Continue follow up with primary OB provider     3. Fetal Growth    - US up to this point reassuring    - Patient requesting repeat growth US for reassurance    - Continue follow up with primary OB provider     4. Postpartum Contraception    - Patient would like tubal ligation, not interested in having more children    - Would like OB consult ASAP    - Agreeable to discussing at OB follow up visit     5. Prefers Female Provider    - Would like to transition to female OB provider    - Will send OB nurses message regarding this, informed patient that they will be reaching out    - Would like to reschedule upcoming 1/15 visit       Precepted today with: Dr Nader Forde MD   [facility] Residency

## 2025-01-11 NOTE — PROVIDER NOTIFICATION
"Data: Patient presented to [facility]: 2025 10:31 AM.  Reason for maternal/fetal assessment is decreased fetal movement. Patient reports, \" She has not been feeling baby move like normal.\". Patient denies leaking of vaginal fluid/rupture of membranes, vaginal bleeding, abdominal pain, headache, visual disturbances, epigastric or RUQ pain, significant edema. Patient reports fetal movement is decreased. Patient is a 31w5d .  Prenatal record reviewed. Pregnancy has been complicated by Hyperemisis .    Vital signs wnl. Support person is not present.     Action: Verbal consent for EFM. Triage assessment completed.     Response: Patient verbalized agreement with plan. Will contact Dr. Forde with update and further orders.    Dr. Forde called and updated on pt arrival and complaints about prenatal care that has been provided. RN requested MD to evaluate pt in person with faculty d/t pts complaints. MD will be down after consulting Dr. Doe.     Report given to Mary Lou CANSECO RN    "

## 2025-01-11 NOTE — PROGRESS NOTES
"Care assumed, prenatal record reviewed, physical assessment completed, discussed plan of care for continuous fetal surveillance for evaluation. Haleigh reporting concerns regarding her clinic provider and requesting to change provider in same clinic, questions from provider about prescribed medications and plan of care including PPTL. Haleigh remains comfortable and denies any pain or uterine activity at this time. Uterus palpates soft, non tender with infrequent mild uterine contraction. She reports intermittent low pelvic cramping and pressure in rectum last evening but states she did not need to have a bowel movement. Eating and drinking normally today and reports having two bagels on her way to the hospital. States nausea and vomiting \"is slowing down.\" She declined IV fluids from home care nurse that visited earlier this week. Dr Forde present in department and at bedside to evaluate patient. This writer reviewed EFM tracing since arrival with provider. Initial non reactive NST with normal baseline, moderate variability, non-recurrent, non-significant variable decelerations. Category I EFM tracing since repositioning to right side.   "

## 2025-01-12 ENCOUNTER — HOME CARE VISIT (OUTPATIENT)
Dept: HOME HEALTH SERVICES | Facility: HOME HEALTH | Age: 40
End: 2025-01-12
Payer: COMMERCIAL

## 2025-01-12 PROCEDURE — S9374 HIT HYDRA 1 LITER DIEM: HCPCS

## 2025-01-12 NOTE — PROGRESS NOTES
Nursing Visit Note:  Nurse visit today for pt cancelled for Haleigh RASHEED Caraballo.     present during visit today: Not Applicable.    may need hydration tomorrow, staffing aware to contact      Nadya Oconnor RN 1/12/2025

## 2025-01-13 ENCOUNTER — TELEPHONE (OUTPATIENT)
Dept: FAMILY MEDICINE | Facility: CLINIC | Age: 40
End: 2025-01-13
Payer: COMMERCIAL

## 2025-01-13 PROCEDURE — S9374 HIT HYDRA 1 LITER DIEM: HCPCS

## 2025-01-13 NOTE — TELEPHONE ENCOUNTER
Patient expressed wishing to change to a female physician for OB care. Writer called patient to discuss, patient would like to stay with [facility] and see a female physician. Care transferred to  for remaining OB cares.    Patient confirmed no reach out from OBGYN. Writer advised will send referral via fax again. Patient verbalized understanding. Fax sent on this date with pregnancy episode, referral, face sheet, fetal anatomy ultrasound and follow up US.     Stuart EL

## 2025-01-14 PROCEDURE — S9374 HIT HYDRA 1 LITER DIEM: HCPCS

## 2025-01-15 PROCEDURE — S9374 HIT HYDRA 1 LITER DIEM: HCPCS

## 2025-01-16 ENCOUNTER — OFFICE VISIT (OUTPATIENT)
Dept: FAMILY MEDICINE | Facility: CLINIC | Age: 40
End: 2025-01-16
Payer: COMMERCIAL

## 2025-01-16 VITALS
SYSTOLIC BLOOD PRESSURE: 101 MMHG | WEIGHT: 164 LBS | HEART RATE: 98 BPM | BODY MASS INDEX: 27.32 KG/M2 | RESPIRATION RATE: 18 BRPM | OXYGEN SATURATION: 96 % | DIASTOLIC BLOOD PRESSURE: 61 MMHG | TEMPERATURE: 98.4 F | HEIGHT: 65 IN

## 2025-01-16 DIAGNOSIS — R82.71 GBS BACTERIURIA: ICD-10-CM

## 2025-01-16 DIAGNOSIS — O21.0 HYPEREMESIS GRAVIDARUM: Primary | ICD-10-CM

## 2025-01-16 DIAGNOSIS — O99.013 ANEMIA IN PREGNANCY, THIRD TRIMESTER: ICD-10-CM

## 2025-01-16 DIAGNOSIS — Z34.83 ENCOUNTER FOR SUPERVISION OF OTHER NORMAL PREGNANCY IN THIRD TRIMESTER: Primary | ICD-10-CM

## 2025-01-16 DIAGNOSIS — Z12.4 CERVICAL CANCER SCREENING: Primary | ICD-10-CM

## 2025-01-16 DIAGNOSIS — Z30.8 ENCOUNTER FOR TUBAL LIGATION COUNSELING: ICD-10-CM

## 2025-01-16 LAB
BASOPHILS # BLD AUTO: 0 10E3/UL (ref 0–0.2)
BASOPHILS NFR BLD AUTO: 0 %
EOSINOPHIL # BLD AUTO: 0 10E3/UL (ref 0–0.7)
EOSINOPHIL NFR BLD AUTO: 1 %
ERYTHROCYTE [DISTWIDTH] IN BLOOD BY AUTOMATED COUNT: 13.4 % (ref 10–15)
HCT VFR BLD AUTO: 31.1 % (ref 35–47)
HGB BLD-MCNC: 10 G/DL (ref 11.7–15.7)
IMM GRANULOCYTES # BLD: 0.1 10E3/UL
IMM GRANULOCYTES NFR BLD: 1 %
LYMPHOCYTES # BLD AUTO: 2.1 10E3/UL (ref 0.8–5.3)
LYMPHOCYTES NFR BLD AUTO: 29 %
MCH RBC QN AUTO: 30.1 PG (ref 26.5–33)
MCHC RBC AUTO-ENTMCNC: 32.2 G/DL (ref 31.5–36.5)
MCV RBC AUTO: 94 FL (ref 78–100)
MONOCYTES # BLD AUTO: 0.5 10E3/UL (ref 0–1.3)
MONOCYTES NFR BLD AUTO: 7 %
NEUTROPHILS # BLD AUTO: 4.5 10E3/UL (ref 1.6–8.3)
NEUTROPHILS NFR BLD AUTO: 63 %
PLATELET # BLD AUTO: 231 10E3/UL (ref 150–450)
RBC # BLD AUTO: 3.32 10E6/UL (ref 3.8–5.2)
WBC # BLD AUTO: 7.2 10E3/UL (ref 4–11)

## 2025-01-16 PROCEDURE — 86780 TREPONEMA PALLIDUM: CPT

## 2025-01-16 PROCEDURE — 36415 COLL VENOUS BLD VENIPUNCTURE: CPT

## 2025-01-16 PROCEDURE — S9374 HIT HYDRA 1 LITER DIEM: HCPCS

## 2025-01-16 PROCEDURE — 85025 COMPLETE CBC W/AUTO DIFF WBC: CPT

## 2025-01-16 NOTE — PROGRESS NOTES
"Haleigh Caraballo is a 39 year old  female who presents to clinic for a follow up OB visit. History of twin delivery via c/s with successful vaginal delivery thereafter.     She is currently 32w3d with an estimated date of delivery of Mar 10, 2025.     Pregnancy complicated by the following:   # Hyperemesis   # Desire for TOLAC  # Desire for Tubal ligation   # GBS bacteruria    - OB referral    - phenergan, home fluids   - growth US   - induction at 39 weeks   - intrapartum Abx     -  OB  for OB     New concerns today: ***    OB History    Para Term  AB Living   5 3 2 1 1 4   SAB IAB Ectopic Multiple Live Births   1 0 0 1 4      # Outcome Date GA Lbr Ras/2nd Weight Sex Type Anes PTL Lv   5 Current            4 SAB 2024 6w0d    SAB      3 Term  40w0d   M    ELLA   2A   32w0d   M -SEC  Y ELLA      Birth Comments: PROM, Twin gestation, delivered via    2B   32w0d   M -SEC  Y ELLA   1 Term  40w0d   M   N ELLA       Physical exam:  /61   Pulse 98   Temp 98.4  F (36.9  C) (Oral)   Resp 18   Ht 1.655 m (5' 5.16\")   Wt 74.4 kg (164 lb)   LMP 2024   SpO2 96%   BMI 27.16 kg/m      General: alert, female in no acute distress  Abdomen: gravid uterus appropriate for gestation age at *** cm above pubic symphysis, non-tender, FHTs ***  Extremities: no edema    Assessment/Plan:  Patient Active Problem List   Diagnosis    Marijuana abuse    Multiple gestation with malpresentation of one fetus or more    Myopic astigmatism of both eyes     delivery    Previous  delivery affecting pregnancy    Trichomoniasis    Encounter for triage in pregnant patient    Hyperemesis gravidarum       Haleigh Caraballo is a 39 year old  female at 32w3d here for initial OB visit. Prenatal course complicated by ***. OB history significant for ***.     Routine prenatal care   - TDAP given ***  - Weight gain and fundal heights have been " ***  - Post-partum contraception plans: ***  - Reviewed  care and baby-friendly practices done at hospital after delivery.  - Discussed pre-term labor signs and symptoms and when to call/come in.   If you notice a decrease in your baby's movement. As a rule, if she perceives fewer than 10 movements in two hours at rest  If your begin to experience contractions that are 5 minutes or less apart and lasting for over 45 seconds, or if contractions are becoming more painful.  If you have any bleeding or leakage of fluids.   If you have a headache not resolved with tylenol, right upper abdominal pain, or sudden onset of swelling.    Follow up in 2 weeks for routine prenatal visit.     Labor Prep Discussion  Clinic/Hospital: [facility]  Partner Name: *** Anyone else attending birth: ***  Ultrasound predicts sex: ***  Childrens names/ages: ***  Previous labor experiences: ***   (Overall length, 2nd stage, medications, bleeding)   Pertinent History: ***   (GDM, HTN, PreE)  Plans for labor pain management: ***    Tdap: ***  Flu: ***  COVID: ***  Hep B: ***   GBS: ***    Post Partum   PP contraception: ***  Hx PPD/Depression/Anxiety: ***  Plans for post partum recovery/time off: ***  Feeding preference: ***  Feeding history: ***  Breast pump: ***  Car seat: ***    Baby  Peds provider:  ***  Circumcision: ***  Baby Meds: ***  Siblings required phototherapy: ***      Anu Forde MD   [facility] Resident    Precepted with: {pvpreceptors:[id number]}

## 2025-01-16 NOTE — PROGRESS NOTES
Faculty Supervision of Residents   I have examined this patient and the medical care has been evaluated and discussed with the resident. See resident note outlining our discussion. Discussed OB risks with resident. Good weight gain.     Erika Johnson MD

## 2025-01-17 LAB — T PALLIDUM AB SER QL: NONREACTIVE

## 2025-01-17 RX ORDER — FERROUS SULFATE 325(65) MG
325 TABLET ORAL
Qty: 90 TABLET | Refills: 0 | Status: SHIPPED | OUTPATIENT
Start: 2025-01-17

## 2025-01-20 ENCOUNTER — CARE COORDINATION (OUTPATIENT)
Dept: HOME HEALTH SERVICES | Facility: HOME HEALTH | Age: 40
End: 2025-01-20
Payer: COMMERCIAL

## 2025-01-20 NOTE — PROGRESS NOTES
Haleigh Caraballo, a referral for home infusion for hyperemesis was made. We saw patient on 1/10/25, at that time she declined wanting a PIV started. She stated maybe tomorrow. Declined visit on 1/11. Requested to be seen on 1/12. Canceled visit due to see Dr. Forde. No call for from patient to reschedule. Had visit scheduled for today, 1/20. We have not been able to get a hold of patient. Looks like she has upcoming visits scheduled at an infusion center for hydration. We are at our 2 weeks for hydration with PIV. I am requesting to discharge patient due to above--scheduled at infusion center and declining home care visits and PIV. Please call with any questions. Aure Pitts, [phone]     Message received from MD Kayla Alvarado to discharge patient. Hyperemesis mostly resolved.

## 2025-01-21 ENCOUNTER — TELEPHONE (OUTPATIENT)
Dept: HOME HEALTH SERVICES | Facility: HOME HEALTH | Age: 40
End: 2025-01-21
Payer: COMMERCIAL

## 2025-01-21 ENCOUNTER — PATIENT OUTREACH (OUTPATIENT)
Dept: CARE COORDINATION | Facility: CLINIC | Age: 40
End: 2025-01-21
Payer: COMMERCIAL

## 2025-01-21 NOTE — TELEPHONE ENCOUNTER
Received a call from patient today asking for a SNV for PIV and hookup.  Confirmed with manager Josee JOYA, no SNV today as pt's provider said OK to discharge.  This was communicated to patient.

## 2025-01-21 NOTE — CONFIDENTIAL NOTE
AUGUST mailed patient list of agencies who provide infant car seats and infant supplies this date.    JAN HERNANDEZ, LGSW, LADC

## 2025-01-27 ENCOUNTER — TELEPHONE (OUTPATIENT)
Dept: FAMILY MEDICINE | Facility: CLINIC | Age: 40
End: 2025-01-27
Payer: COMMERCIAL

## 2025-01-27 NOTE — TELEPHONE ENCOUNTER
Patient called back for results from U/S -- read Dr note to her -- she expressed understanding and no questions

## 2025-01-28 ENCOUNTER — HOSPITAL ENCOUNTER (EMERGENCY)
Facility: HOSPITAL | Age: 40
End: 2025-01-28
Payer: COMMERCIAL

## 2025-01-28 ENCOUNTER — TELEPHONE (OUTPATIENT)
Dept: FAMILY MEDICINE | Facility: CLINIC | Age: 40
End: 2025-01-28

## 2025-01-28 ENCOUNTER — HOSPITAL ENCOUNTER (OUTPATIENT)
Facility: HOSPITAL | Age: 40
Discharge: HOME | End: 2025-01-28
Attending: FAMILY MEDICINE | Admitting: FAMILY MEDICINE
Payer: COMMERCIAL

## 2025-01-28 VITALS
HEART RATE: 99 BPM | RESPIRATION RATE: 16 BRPM | SYSTOLIC BLOOD PRESSURE: 93 MMHG | TEMPERATURE: 98.5 F | DIASTOLIC BLOOD PRESSURE: 51 MMHG

## 2025-01-28 DIAGNOSIS — O21.0 HYPEREMESIS GRAVIDARUM: Primary | ICD-10-CM

## 2025-01-28 LAB
ALBUMIN UR-MCNC: 20 MG/DL
APPEARANCE UR: ABNORMAL
BACTERIA #/AREA URNS HPF: ABNORMAL /HPF
BILIRUB UR QL STRIP: NEGATIVE
CLUE CELLS: ABNORMAL
COLOR UR AUTO: YELLOW
GLUCOSE UR STRIP-MCNC: NEGATIVE MG/DL
HGB UR QL STRIP: NEGATIVE
KETONES UR STRIP-MCNC: NEGATIVE MG/DL
LEUKOCYTE ESTERASE UR QL STRIP: NEGATIVE
MUCOUS THREADS #/AREA URNS LPF: PRESENT /LPF
NITRATE UR QL: NEGATIVE
PH UR STRIP: 6 [PH] (ref 5–7)
RBC URINE: 1 /HPF
SP GR UR STRIP: 1.02 (ref 1–1.03)
SQUAMOUS EPITHELIAL: 9 /HPF
TRICHOMONAS, WET PREP: ABNORMAL
UROBILINOGEN UR STRIP-MCNC: <2 MG/DL
WBC URINE: 6 /HPF
WBC'S/HIGH POWER FIELD, WET PREP: ABNORMAL
YEAST, WET PREP: ABNORMAL

## 2025-01-28 PROCEDURE — 258N000003 HC RX IP 258 OP 636

## 2025-01-28 PROCEDURE — 87210 SMEAR WET MOUNT SALINE/INK: CPT

## 2025-01-28 PROCEDURE — 81003 URINALYSIS AUTO W/O SCOPE: CPT

## 2025-01-28 PROCEDURE — 250N000011 HC RX IP 250 OP 636

## 2025-01-28 PROCEDURE — G0463 HOSPITAL OUTPT CLINIC VISIT: HCPCS

## 2025-01-28 RX ORDER — METOCLOPRAMIDE HYDROCHLORIDE 5 MG/ML
10 INJECTION INTRAMUSCULAR; INTRAVENOUS EVERY 6 HOURS PRN
Status: DISCONTINUED | OUTPATIENT
Start: 2025-01-28 | End: 2025-01-28 | Stop reason: HOSPADM

## 2025-01-28 RX ORDER — LIDOCAINE 40 MG/G
CREAM TOPICAL
Status: DISCONTINUED | OUTPATIENT
Start: 2025-01-28 | End: 2025-01-28 | Stop reason: HOSPADM

## 2025-01-28 RX ORDER — PROMETHAZINE HYDROCHLORIDE 12.5 MG/1
12.5 TABLET ORAL EVERY 6 HOURS PRN
COMMUNITY

## 2025-01-28 RX ADMIN — METOCLOPRAMIDE 10 MG: 5 INJECTION, SOLUTION INTRAMUSCULAR; INTRAVENOUS at 14:30

## 2025-01-28 RX ADMIN — SODIUM CHLORIDE, POTASSIUM CHLORIDE, SODIUM LACTATE AND CALCIUM CHLORIDE 1000 ML: 600; 310; 30; 20 INJECTION, SOLUTION INTRAVENOUS at 13:58

## 2025-01-28 ASSESSMENT — ACTIVITIES OF DAILY LIVING (ADL)
ADLS_ACUITY_SCORE: 41

## 2025-01-28 NOTE — PROGRESS NOTES
OBSTETRICS TRIAGE ASSESSMENT NOTE    Haleigh Caraballo is a 39 year old  at 34w1d gestation based on  1st trimester ultrasound who has presented to maternity care for further evaluation of light-headedness. Baby is moving normally.    Patient reports light-headedness since waking up this morning. Was seen at OB/GYN clinic today for postpartum tubal ligation discussion. They recommended she be evaluated here due to reported light-headedness. Patient has history of hyperemesis this pregnancy. Reports significant improvement in symptoms but has vomited a few times this week. Reports decreased PO intake in the past 24 hours. Denies chest pain, shortness of breath, palpitations, dizziness/room-spinning sensation.    Patient also reports pelvic pressure and contractions that occurred last night. States that last night, she had a few contractions with simultaneous pelvic pressure that were spaced a few hours apart. No contractions since about midnight.     Reports significant increase in urinary frequency and urgency for past 3 days. Denies pain with urination.     Denies vaginal bleeding, vaginal discharge, leakage of fluids, fever/chills, abdominal pain, chest pain, shortness of breath, palpitations.     PRENATAL CARE  Seen by Dr. Conway at [facility].    YAMILE 3/10/25 per 1st trimester US.  Prenatal course complicated by hyperemesis gravidarum.   It's a girl!        PAST MEDICAL HISTORY  No past medical history on file.    PAST SURGICAL HISTORY   Past Surgical History:   Procedure Laterality Date     SECTION       SECTION         MEDICATIONS  No current facility-administered medications for this encounter.    SOCIAL HISTORY:   Social History     Socioeconomic History    Marital status: Single     Spouse name: Not on file    Number of children: Not on file    Years of education: Not on file    Highest education level: Not on file   Occupational History    Not on file   Tobacco Use     Smoking status: Never     Passive exposure: Never    Smokeless tobacco: Never   Substance and Sexual Activity    Alcohol use: Not Currently    Drug use: Not Currently     Types: Marijuana     Comment: stopped taking when she learned she was pregnant    Sexual activity: Not Currently     Birth control/protection: None   Other Topics Concern    Not on file   Social History Narrative    Not on file     Social Drivers of Health     Financial Resource Strain: Not on file   Food Insecurity: Not on file   Transportation Needs: Not on file   Physical Activity: Not on file   Stress: Not on file   Social Connections: Not on file   Interpersonal Safety: Low Risk  (8/14/2024)    Interpersonal Safety     Do you feel physically and emotionally safe where you currently live?: Yes     Within the past 12 months, have you been hit, slapped, kicked or otherwise physically hurt by someone?: No     Within the past 12 months, have you been humiliated or emotionally abused in other ways by your partner or ex-partner?: No   Housing Stability: Not on file       PHYSICAL EXAMINATION   BP 93/51 (BP Location: Left arm, Patient Position: Right side, Cuff Size: Adult Regular)   Pulse 99   Temp 98.5  F (36.9  C) (Oral)   Resp 16   LMP 06/03/2024   Gen: appears comfortable, no acute distress  CV: regular rate and rhythm, no murmur appreciated  Lungs: clear to ausculation, good air movement throughout  Abdomen: Gravid, non-tender fundus, does have some mild suprapubic tenderness to palpation    FHT: Category 1 tracing; baseline 135 with moderate variability and accelerations, no decelerations  Contractions: none    LAB RESULTS  Personally reviewed.  No results found for this or any previous visit (from the past 24 hours).    ASSESSMENT:  Haleigh Caraballo is a 39 year old year old at 34w1d weeks presents with several hours of orthostatic light-headedness, intermittent contractions/pelvic pressure last night that have now resolved, and three  days of increased urinary urgency/frequency. Light-headedness presumed 2/2 dehydration; resolved with IVF bolus. Wet prep and UA wnl. Patient feeling better.     PLAN:  S/p 1 L bolus LR  Follow-up in clinic as scheduled   Discussed return precautions     Elizabeth Montoya MD  [facility]       Precepted patient with Dr. Norbert Doe who agrees with the plan above.

## 2025-01-28 NOTE — TELEPHONE ENCOUNTER
FYI - Status Update    Who is Calling: patient    Update: Patient returning VM left by ASHLEY Andino, Patient Rep relayed message from Dr. Anu Forde noted 1/24/2025 from US. No further questions    Does caller want a call/response back: No

## 2025-01-28 NOTE — PROGRESS NOTES
"IV bolus finished, pt states she \"feels better\" and would like to go home. Discharged to home. Will follow up at clinic next week.  "

## 2025-01-28 NOTE — PROGRESS NOTES
Pt presents to [facility] from ER with c/o feeling lightheaded, feeling occasional pelvic pressure, felt contractions occasionally last night but not really feeling them today. Denies leaking of fluid and bleeding. +FM. Will page OB resident.

## 2025-02-03 ENCOUNTER — OFFICE VISIT (OUTPATIENT)
Dept: FAMILY MEDICINE | Facility: CLINIC | Age: 40
End: 2025-02-03
Payer: COMMERCIAL

## 2025-02-03 VITALS
OXYGEN SATURATION: 96 % | DIASTOLIC BLOOD PRESSURE: 68 MMHG | WEIGHT: 164 LBS | HEART RATE: 96 BPM | SYSTOLIC BLOOD PRESSURE: 112 MMHG | TEMPERATURE: 97.6 F | RESPIRATION RATE: 18 BRPM | HEIGHT: 61 IN | BODY MASS INDEX: 30.96 KG/M2

## 2025-02-03 DIAGNOSIS — O99.013 ANEMIA DURING PREGNANCY IN THIRD TRIMESTER: ICD-10-CM

## 2025-02-03 DIAGNOSIS — O21.0 HYPEREMESIS GRAVIDARUM: Primary | ICD-10-CM

## 2025-02-03 RX ORDER — PROMETHAZINE HYDROCHLORIDE 12.5 MG/1
12.5 TABLET ORAL EVERY 6 HOURS PRN
Qty: 60 TABLET | Refills: 1 | Status: SHIPPED | OUTPATIENT
Start: 2025-02-03

## 2025-02-03 NOTE — PROGRESS NOTES
Preceptor Attestation:  Patient's case reviewed and discussed with the resident, Omaira Conway MD, and I personally evaluated the patient. I agree with written assessment and plan of care.    Supervising Physician:  Cheyenne Soria MD   [facility]

## 2025-02-03 NOTE — PROGRESS NOTES
"SUBJECTIVE:  Haleigh Caraballo is a  at 35w0d gestation by 1st trimester US who returns today for prenatal care. Pregnancy has been complicated by hyperemesis & anemia in pregnancy. Estimated Date of Delivery: Mar 10, 2025    - Concerns today: None  - Patient reports no vaginal bleeding, no contractions/severe cramping, no leakage of fluid. Contractions absent. Fetal movement is Present.    - Reports occasional nausea/vomiting, currently controlled with promethazine q6hrs as needed . No heartburn.   - No vaginal discharge. No dysuria.   - No headache, vision changes, lower extremity swelling, upper abdominal pain, chest pain, shortness of breath.   - Mood has been stable.      OBJECTIVE:  /68   Pulse 96   Temp 97.6  F (36.4  C) (Tympanic)   Resp 18   Ht 1.56 m (5' 1.42\")   Wt 74.4 kg (164 lb)   LMP 2024   SpO2 96%   BMI 30.57 kg/m    Const: No distress  Abd: Gravid.   Fundal Height: 33 cm   FHT: 140s  Cervix: deferred    Labs today: No results found for any visits on 25.    ASSESSMENT/PLAN:  39 year old , 35w0d weeks of pregnancy with YAMILE of 3/10/2025, by Last Menstrual Period, confirmed by 1st trimester US.    Haleigh was seen today for ob follow up .    Diagnoses and all orders for this visit:    Routine Prenatal Care  -requesting elective induction at 39 weeks  -prenatal labs reviewed   -pregnancy weight gain has been 15 kg to date  -declining Covid/flu. Received Tdap 2024. Did not receive RSV immunization & has now phased out   -OB US obtained . Cephalic presentation. EFW 22% percentile. No abnormal findings.   -Follow up in 1 week     Hyperemesis gravidarum  Seen at OB triage one week ago for lightheadedness. Resolved s/p 1L IVF. Patient feels her nausea/vomiting is better controlled with promethazine 12.5 mg q6hrs PRN. Encouraged adequate PO intake.   -     promethazine (PHENERGAN) 12.5 MG tablet; Take 1 tablet (12.5 mg) by mouth every 6 hours as needed for " nausea.    Anemia in Pregnancy  Hgb 10.0 1/16/2025. Started on oral iron. Patient tolerating oral iron. Plan to recheck CBC & ferritin at next visit.     Planning for postpartum tubal ligation   Met with OB provider for consent.     GBS bacteruria   Will require intrapartum abx. Patient aware     Return to clinic in 1 week.     Options for treatment and/or follow-up care were reviewed with the patient. Haleigh RASHEED Caraballo was engaged and actively involved in the decision making process. She verbalized understanding of the options discussed and was satisfied with the final plan.    Precepted with Dr. Yang Conway MD

## 2025-02-09 ENCOUNTER — HOSPITAL ENCOUNTER (OUTPATIENT)
Facility: HOSPITAL | Age: 40
End: 2025-02-09
Admitting: FAMILY MEDICINE
Payer: COMMERCIAL

## 2025-02-09 ENCOUNTER — HOSPITAL ENCOUNTER (OUTPATIENT)
Facility: HOSPITAL | Age: 40
Discharge: HOME | End: 2025-02-09
Attending: FAMILY MEDICINE | Admitting: FAMILY MEDICINE
Payer: COMMERCIAL

## 2025-02-09 VITALS
BODY MASS INDEX: 28.34 KG/M2 | HEIGHT: 64 IN | SYSTOLIC BLOOD PRESSURE: 109 MMHG | DIASTOLIC BLOOD PRESSURE: 67 MMHG | RESPIRATION RATE: 22 BRPM | TEMPERATURE: 101.4 F | WEIGHT: 166 LBS

## 2025-02-09 DIAGNOSIS — O21.0 HYPEREMESIS GRAVIDARUM: Primary | ICD-10-CM

## 2025-02-09 LAB
ALBUMIN UR-MCNC: 70 MG/DL
ANION GAP SERPL CALCULATED.3IONS-SCNC: 9 MMOL/L (ref 7–15)
APPEARANCE UR: ABNORMAL
BACTERIA #/AREA URNS HPF: ABNORMAL /HPF
BASOPHILS # BLD AUTO: 0 10E3/UL (ref 0–0.2)
BASOPHILS NFR BLD AUTO: 0 %
BILIRUB UR QL STRIP: NEGATIVE
BUN SERPL-MCNC: 8.3 MG/DL (ref 6–20)
CALCIUM SERPL-MCNC: 9.2 MG/DL (ref 8.8–10.4)
CHLORIDE SERPL-SCNC: 103 MMOL/L (ref 98–107)
COLOR UR AUTO: YELLOW
CREAT SERPL-MCNC: 0.63 MG/DL (ref 0.51–0.95)
EGFRCR SERPLBLD CKD-EPI 2021: >90 ML/MIN/1.73M2
EOSINOPHIL # BLD AUTO: 0 10E3/UL (ref 0–0.7)
EOSINOPHIL NFR BLD AUTO: 0 %
ERYTHROCYTE [DISTWIDTH] IN BLOOD BY AUTOMATED COUNT: 13.7 % (ref 10–15)
FLUAV RNA SPEC QL NAA+PROBE: NEGATIVE
FLUBV RNA RESP QL NAA+PROBE: NEGATIVE
GLUCOSE SERPL-MCNC: 77 MG/DL (ref 70–99)
GLUCOSE UR STRIP-MCNC: NEGATIVE MG/DL
HCO3 SERPL-SCNC: 26 MMOL/L (ref 22–29)
HCT VFR BLD AUTO: 34.4 % (ref 35–47)
HGB BLD-MCNC: 11.1 G/DL (ref 11.7–15.7)
HGB UR QL STRIP: NEGATIVE
HOLD SPECIMEN: NORMAL
HOLD SPECIMEN: NORMAL
IMM GRANULOCYTES # BLD: 0.2 10E3/UL
IMM GRANULOCYTES NFR BLD: 2 %
KETONES UR STRIP-MCNC: 20 MG/DL
LEUKOCYTE ESTERASE UR QL STRIP: ABNORMAL
LYMPHOCYTES # BLD AUTO: 2 10E3/UL (ref 0.8–5.3)
LYMPHOCYTES NFR BLD AUTO: 28 %
MCH RBC QN AUTO: 29.7 PG (ref 26.5–33)
MCHC RBC AUTO-ENTMCNC: 32.3 G/DL (ref 31.5–36.5)
MCV RBC AUTO: 92 FL (ref 78–100)
MONOCYTES # BLD AUTO: 0.4 10E3/UL (ref 0–1.3)
MONOCYTES NFR BLD AUTO: 6 %
MUCOUS THREADS #/AREA URNS LPF: PRESENT /LPF
NEUTROPHILS # BLD AUTO: 4.4 10E3/UL (ref 1.6–8.3)
NEUTROPHILS NFR BLD AUTO: 63 %
NITRATE UR QL: NEGATIVE
NRBC # BLD AUTO: 0 10E3/UL
NRBC BLD AUTO-RTO: 0 /100
PH UR STRIP: 7 [PH] (ref 5–7)
PLATELET # BLD AUTO: 270 10E3/UL (ref 150–450)
POTASSIUM SERPL-SCNC: 3.7 MMOL/L (ref 3.4–5.3)
RBC # BLD AUTO: 3.74 10E6/UL (ref 3.8–5.2)
RBC URINE: 1 /HPF
RSV RNA SPEC NAA+PROBE: NEGATIVE
SARS-COV-2 RNA RESP QL NAA+PROBE: NEGATIVE
SODIUM SERPL-SCNC: 138 MMOL/L (ref 135–145)
SP GR UR STRIP: 1.03 (ref 1–1.03)
SQUAMOUS EPITHELIAL: 7 /HPF
UROBILINOGEN UR STRIP-MCNC: 3 MG/DL
WBC # BLD AUTO: 7.1 10E3/UL (ref 4–11)
WBC URINE: 4 /HPF

## 2025-02-09 PROCEDURE — 87637 SARSCOV2&INF A&B&RSV AMP PRB: CPT

## 2025-02-09 PROCEDURE — 36415 COLL VENOUS BLD VENIPUNCTURE: CPT

## 2025-02-09 PROCEDURE — 250N000011 HC RX IP 250 OP 636

## 2025-02-09 PROCEDURE — 85025 COMPLETE CBC W/AUTO DIFF WBC: CPT

## 2025-02-09 PROCEDURE — 99213 OFFICE O/P EST LOW 20 MIN: CPT | Mod: GC

## 2025-02-09 PROCEDURE — 81001 URINALYSIS AUTO W/SCOPE: CPT

## 2025-02-09 PROCEDURE — 80048 BASIC METABOLIC PNL TOTAL CA: CPT

## 2025-02-09 PROCEDURE — 87086 URINE CULTURE/COLONY COUNT: CPT

## 2025-02-09 RX ORDER — ONDANSETRON 2 MG/ML
4 INJECTION INTRAMUSCULAR; INTRAVENOUS EVERY 6 HOURS PRN
Status: DISCONTINUED | OUTPATIENT
Start: 2025-02-09 | End: 2025-02-09 | Stop reason: HOSPADM

## 2025-02-09 RX ORDER — PROCHLORPERAZINE MALEATE 10 MG
10 TABLET ORAL EVERY 6 HOURS PRN
Status: DISCONTINUED | OUTPATIENT
Start: 2025-02-09 | End: 2025-02-09 | Stop reason: HOSPADM

## 2025-02-09 RX ORDER — METOCLOPRAMIDE HYDROCHLORIDE 5 MG/ML
10 INJECTION INTRAMUSCULAR; INTRAVENOUS EVERY 6 HOURS PRN
Status: DISCONTINUED | OUTPATIENT
Start: 2025-02-09 | End: 2025-02-09 | Stop reason: HOSPADM

## 2025-02-09 RX ORDER — ONDANSETRON 4 MG/1
4 TABLET, ORALLY DISINTEGRATING ORAL EVERY 6 HOURS PRN
Status: DISCONTINUED | OUTPATIENT
Start: 2025-02-09 | End: 2025-02-09 | Stop reason: HOSPADM

## 2025-02-09 RX ADMIN — METOCLOPRAMIDE 10 MG: 5 INJECTION, SOLUTION INTRAMUSCULAR; INTRAVENOUS at 12:39

## 2025-02-09 ASSESSMENT — ACTIVITIES OF DAILY LIVING (ADL)
ADLS_ACUITY_SCORE: 15

## 2025-02-09 NOTE — PROGRESS NOTES
Data: Patient assessed in the [facility] for nausea and vomiting. Cervix (P) 0.5 (Closed at internal os) cm dilated and (P) 40-50% effaced. Fetal station (P) -3. Membranes intact. Contractions are present occasionally. Contactions are  ,   minutes apart, and last   seconds. Uterine assessment is mild during contractions and at rest. See flowsheets for fetal assessment documentation.     Action: Presumed adequate fetal oxygenation documented. Discharge instructions reviewed. Patient instructed to report change in fetal movement, vaginal leaking of fluid or bleeding, abdominal pain, or any concerns related to the pregnancy to provider/clinic.      Response: Orders to discharge home per Dr. Herndon. Patient verbalized understanding of education and agreement with plan. Discharged to home at 1345.

## 2025-02-09 NOTE — PROGRESS NOTES
Data: Patient presented to [facility]: 2025 10:32 AM.  Reason for maternal/fetal assessment is nausea and vomiting and HA/lightheadedness . Patient reports feeling ill with N/V, HA, lightheadedness, cough, and occ ctx with pelvic pressure. Patient denies leaking of vaginal fluid/rupture of membranes, vaginal bleeding, abdominal pain, visual disturbances, epigastric or RUQ pain, significant edema. Patient reports fetal movement is normal. Patient is a 35w6d .  Prenatal record reviewed. Pregnancy has been uncomplicated.    Vital signs  WNL besides elevated temp of 101.5  . Support person is not present.     Action: Verbal consent for EFM. Triage assessment completed.     Response: Patient verbalized agreement with plan. Will contact Dr. Herndon with update and further orders.

## 2025-02-09 NOTE — PROGRESS NOTES
OBSTETRICS TRIAGE ASSESSMENT NOTE    Haleigh Caraballo is a 39 year old  at 35w6d gestation based on  1st trimester US  who has presented to maternity care for further evaluation of 2 days of symptoms.    Started having a cough, headache, and runny nose yesterday.    Also started having nausea, vomiting and unable to keep anything down since last night.       No bleeding, leakage of fluids, contractions - is feeling pelvic pressure. Baby is moving normally.      PRENATAL CARE  Seen by Dr. Conway at [facility].         PAST MEDICAL HISTORY  No past medical history on file.    PAST SURGICAL HISTORY   Past Surgical History:   Procedure Laterality Date     SECTION       SECTION         MEDICATIONS    Current Facility-Administered Medications:     metoclopramide (REGLAN) injection 10 mg, 10 mg, Intravenous, Q6H PRN, Santos Herndon MD, 10 mg at 25 1239    ondansetron (ZOFRAN ODT) ODT tab 4 mg, 4 mg, Oral, Q6H PRN **OR** ondansetron (ZOFRAN) injection 4 mg, 4 mg, Intravenous, Q6H PRN, Santos Herndon MD    prochlorperazine (COMPAZINE) injection 10 mg, 10 mg, Intravenous, Q6H PRN **OR** prochlorperazine (COMPAZINE) tablet 10 mg, 10 mg, Oral, Q6H PRN, Santos Herndon MD    promethazine (PHENERGAN) 6.25 mg in sodium chloride 0.9 % 55 mL intermittent infusion, 6.25 mg, Intravenous, Q6H PRN, Santos Herndon MD    sodium chloride 0.9% BOLUS 1,000 mL, 1,000 mL, Intravenous, Once, Santos Herndon MD    SOCIAL HISTORY:   Social History     Socioeconomic History    Marital status: Single     Spouse name: Not on file    Number of children: Not on file    Years of education: Not on file    Highest education level: Not on file   Occupational History    Not on file   Tobacco Use    Smoking status: Never     Passive exposure: Never    Smokeless tobacco: Never   Substance and Sexual Activity    Alcohol use: Not Currently    Drug use: Not Currently     Types: Marijuana     Comment: stopped taking when she  "learned she was pregnant    Sexual activity: Not Currently     Birth control/protection: None   Other Topics Concern    Not on file   Social History Narrative    Not on file     Social Drivers of Health     Financial Resource Strain: Not on file   Food Insecurity: Not on file   Transportation Needs: Not on file   Physical Activity: Not on file   Stress: Not on file   Social Connections: Not on file   Interpersonal Safety: Low Risk  (8/14/2024)    Interpersonal Safety     Do you feel physically and emotionally safe where you currently live?: Yes     Within the past 12 months, have you been hit, slapped, kicked or otherwise physically hurt by someone?: No     Within the past 12 months, have you been humiliated or emotionally abused in other ways by your partner or ex-partner?: No   Housing Stability: Not on file       PHYSICAL EXAMINATION   /67 (BP Location: Right arm, Cuff Size: Adult Regular)   Temp (!) 101.4  F (38.6  C) (Oral)   Resp 22   Ht 1.626 m (5' 4\")   Wt 75.3 kg (166 lb)   LMP 06/03/2024   BMI 28.49 kg/m    Gen: appears comfortable, no acute distress  CV: regular rate and rhythm, no murmur appreciated  Lungs: clear to ausculation, good air movement throughout  Abdomen: Gravid, non-tender fundus  Extremities: no lower extremity edema  Cervix: not checked  FHT: Category 1 tracing; baseline 140s with moderate variability and accelerations, no decelerations  Contractions: none    LAB RESULTS  Personally reviewed.  Recent Results (from the past 24 hours)   Basic metabolic panel    Collection Time: 02/09/25 11:35 AM   Result Value Ref Range    Sodium 138 135 - 145 mmol/L    Potassium 3.7 3.4 - 5.3 mmol/L    Chloride 103 98 - 107 mmol/L    Carbon Dioxide (CO2) 26 22 - 29 mmol/L    Anion Gap 9 7 - 15 mmol/L    Urea Nitrogen 8.3 6.0 - 20.0 mg/dL    Creatinine 0.63 0.51 - 0.95 mg/dL    GFR Estimate >90 >60 mL/min/1.73m2    Calcium 9.2 8.8 - 10.4 mg/dL    Glucose 77 70 - 99 mg/dL   CBC with platelets and " differential    Collection Time: 02/09/25 11:35 AM   Result Value Ref Range    WBC Count 7.1 4.0 - 11.0 10e3/uL    RBC Count 3.74 (L) 3.80 - 5.20 10e6/uL    Hemoglobin 11.1 (L) 11.7 - 15.7 g/dL    Hematocrit 34.4 (L) 35.0 - 47.0 %    MCV 92 78 - 100 fL    MCH 29.7 26.5 - 33.0 pg    MCHC 32.3 31.5 - 36.5 g/dL    RDW 13.7 10.0 - 15.0 %    Platelet Count 270 150 - 450 10e3/uL    % Neutrophils 63 %    % Lymphocytes 28 %    % Monocytes 6 %    % Eosinophils 0 %    % Basophils 0 %    % Immature Granulocytes 2 %    NRBCs per 100 WBC 0 <1 /100    Absolute Neutrophils 4.4 1.6 - 8.3 10e3/uL    Absolute Lymphocytes 2.0 0.8 - 5.3 10e3/uL    Absolute Monocytes 0.4 0.0 - 1.3 10e3/uL    Absolute Eosinophils 0.0 0.0 - 0.7 10e3/uL    Absolute Basophils 0.0 0.0 - 0.2 10e3/uL    Absolute Immature Granulocytes 0.2 <=0.4 10e3/uL    Absolute NRBCs 0.0 10e3/uL   Extra Purple Top EDTA (LAB USE ONLY)    Collection Time: 02/09/25 11:37 AM   Result Value Ref Range    Hold Specimen JIC    Extra Red Top Tube (LAB USE ONLY)    Collection Time: 02/09/25 11:37 AM   Result Value Ref Range    Hold Specimen JIC    Influenza A/B, RSV and SARS-CoV2 PCR (COVID-19) Nasopharyngeal    Collection Time: 02/09/25 11:40 AM    Specimen: Nasopharyngeal; Swab   Result Value Ref Range    Influenza A PCR Negative Negative    Influenza B PCR Negative Negative    RSV PCR Negative Negative    SARS CoV2 PCR Negative Negative   UA with Microscopic reflex to Culture    Collection Time: 02/09/25 11:41 AM    Specimen: Urine, Clean Catch   Result Value Ref Range    Color Urine Yellow Colorless, Straw, Light Yellow, Yellow    Appearance Urine Turbid (A) Clear    Glucose Urine Negative Negative mg/dL    Bilirubin Urine Negative Negative    Ketones Urine 20 (A) Negative mg/dL    Specific Gravity Urine 1.033 (H) 1.001 - 1.030    Blood Urine Negative Negative    pH Urine 7.0 5.0 - 7.0    Protein Albumin Urine 70 (A) Negative mg/dL    Urobilinogen Urine 3.0 (A) <2.0 mg/dL     Nitrite Urine Negative Negative    Leukocyte Esterase Urine 75 Jeremie/uL (A) Negative    Bacteria Urine Moderate (A) None Seen /HPF    Mucus Urine Present (A) None Seen /LPF    RBC Urine 1 <=2 /HPF    WBC Urine 4 <=5 /HPF    Squamous Epithelials Urine 7 (H) <=1 /HPF       ASSESSMENT  Haleigh Caraballo is a 39 year old year old at 35w6d weeks in not in labor, presenting with cough, runny nose, fever, nausea, and vomiting concerning of for URI, hyperemesis of pregnancy, or another infection (potentially pyelo)    PLAN:  1 L NS bolus and prn nausea medications  Will consider steroid treatment but wish to wait on testing before that  Patient has required steroids for nausea in the past  CBC, BMP, triple swab covid/rsv/influenza, urine culture  BMP normal, CBC with slight anemia  UA with some infectious signs but will await culture to decide on treatment  Patient has close follow up with Dr. Conway in clinic tomorrow at which visit they can decide on UTI treatment if culture back   Will be okay to discharge if tolerating PO at lunch  Fetal heart tones Cat I during stay    Santos Herndon MD  [facility]       Precepted patient with Dr. Manzo who agrees with the plan above.

## 2025-02-10 ENCOUNTER — OFFICE VISIT (OUTPATIENT)
Dept: FAMILY MEDICINE | Facility: CLINIC | Age: 40
End: 2025-02-10
Payer: COMMERCIAL

## 2025-02-10 VITALS
OXYGEN SATURATION: 98 % | TEMPERATURE: 98.4 F | WEIGHT: 165 LBS | SYSTOLIC BLOOD PRESSURE: 104 MMHG | HEART RATE: 92 BPM | HEIGHT: 64 IN | RESPIRATION RATE: 20 BRPM | DIASTOLIC BLOOD PRESSURE: 72 MMHG | BODY MASS INDEX: 28.17 KG/M2

## 2025-02-10 DIAGNOSIS — Z34.83 ENCOUNTER FOR SUPERVISION OF OTHER NORMAL PREGNANCY IN THIRD TRIMESTER: Primary | ICD-10-CM

## 2025-02-10 DIAGNOSIS — O21.0 HYPEREMESIS GRAVIDARUM: ICD-10-CM

## 2025-02-10 LAB — BACTERIA UR CULT: NO GROWTH

## 2025-02-10 PROCEDURE — 99207 PR PRENATAL VISIT: CPT | Mod: GC

## 2025-02-10 RX ORDER — METHYLPREDNISOLONE 16 MG/1
16 TABLET ORAL EVERY 8 HOURS
Qty: 10 TABLET | Refills: 0 | Status: SHIPPED | OUTPATIENT
Start: 2025-02-11 | End: 2025-02-14

## 2025-02-10 NOTE — PROGRESS NOTES
"SUBJECTIVE:  Haleigh Caraballo is a  at 36w0d gestation by 1st trimester US who returns today for prenatal care. Estimated Date of Delivery: Mar 10, 2025    - Concerns today: worsening N/V, was seen in triage yesterday. Received IVF. UC negative, respiratory viral panel negative. Patient still feeling nauseous this AM. Was able to tolerate some PO intake prior to visit.   - Patient reports no vaginal bleeding, no contractions/severe cramping, no leakage of fluid. Contractions absent. Fetal movement is Present.   - No heartburn.   - No vaginal discharge. No dysuria.   - No headache, vision changes, lower extremity swelling, upper abdominal pain, chest pain, shortness of breath.   - Mood has been stable.    Haleigh Caraballo speaks English so an  was not used today.    OBJECTIVE:  /72   Pulse 92   Temp 98.4  F (36.9  C) (Tympanic)   Resp 20   Ht 1.626 m (5' 4\")   Wt 74.8 kg (165 lb)   LMP 2024   SpO2 98%   BMI 28.32 kg/m    Const: No distress  Abd: Gravid.   See flowsheet for fundal height, FHTs, edema, cervical exam.    Labs today: No results found for any visits on 02/10/25.    ASSESSMENT/PLAN:  39 year old , 36w0d weeks of pregnancy with YAMILE of 3/10/2025, by Last Menstrual Period, confirmed by 1st trimester US.    Encounter for supervision of other normal pregnancy in third trimester  Routine Prenatal Care  -requesting elective induction at 39 weeks  -prenatal labs reviewed   -pregnancy weight gain has been 15.4 kg to date  -declining Covid/flu. Received Tdap 2024. Did not receive RSV immunization & has now phased out   -OB US obtained . Cephalic presentation. EFW 22% percentile. No abnormal findings.   -cephalic presentation confirmed by bedside ultrasound 2/10/2024  -Follow up in 1 week      Hyperemesis gravidarum  Seen at OB triage yesterday for worsening N/V. Found to have fever. UC and respiratory viral panel negative. No other signs of infection. " Resolved s/p 1L IVF. Patient still feeling nauseous this AM. Tolerated PO intake prior to visit. Patient still taking promethazine 12.5 mg q6hrs PRN. Per ACOG guidelines, patient now meeting criteria for methylprednisolone. Start with 16 mg q8hrs for 3 days. Will follow up with patient to assess improvement.    -     methylPREDNISolone (MEDROL) 16 MG tablet; Take 1 tablet (16 mg) by mouth every 8 hours for 3 days.     Anemia in Pregnancy  Hgb 10.0 1/16/2025. Started on oral iron. Patient tolerating oral iron. Hgb now 11.1. Continue with oral iron supplementation.      Planning for postpartum tubal ligation   Met with OB provider for consent.      GBS bacteruria   Will require intrapartum abx. Patient aware      Return to clinic in 1 week.       Options for treatment and/or follow-up care were reviewed with the patient. Haleigh IQBAL Washington was engaged and actively involved in the decision making process. She verbalized understanding of the options discussed and was satisfied with the final plan.    Precepted with Dr. Yang Conway MD

## 2025-02-17 ENCOUNTER — OFFICE VISIT (OUTPATIENT)
Dept: FAMILY MEDICINE | Facility: CLINIC | Age: 40
End: 2025-02-17
Payer: COMMERCIAL

## 2025-02-17 VITALS
WEIGHT: 175 LBS | BODY MASS INDEX: 29.16 KG/M2 | HEIGHT: 65 IN | DIASTOLIC BLOOD PRESSURE: 74 MMHG | SYSTOLIC BLOOD PRESSURE: 109 MMHG | HEART RATE: 108 BPM | OXYGEN SATURATION: 100 % | RESPIRATION RATE: 22 BRPM | TEMPERATURE: 97.9 F

## 2025-02-17 DIAGNOSIS — O21.0 HYPEREMESIS GRAVIDARUM: Primary | ICD-10-CM

## 2025-02-17 DIAGNOSIS — Z34.83 ENCOUNTER FOR SUPERVISION OF OTHER NORMAL PREGNANCY IN THIRD TRIMESTER: ICD-10-CM

## 2025-02-17 NOTE — PROGRESS NOTES
"SUBJECTIVE:  Haleigh Caraballo is a  at 36w0d gestation by 1st trimester US who returns today for prenatal care. Estimated Date of Delivery: Mar 10, 2025     - Concerns today: none  - Patient reports no vaginal bleeding, no contractions/severe cramping, no leakage of fluid. Contractions absent. Fetal movement is Present.   - No nausea/vomiting. No heartburn.   - No vaginal discharge. No dysuria.   - No headache, vision changes, lower extremity swelling, upper abdominal pain, chest pain, shortness of breath.   - Mood has been stable.      OBJECTIVE:  /74 (BP Location: Right arm, Patient Position: Sitting)   Pulse 108   Temp 97.9  F (36.6  C) (Oral)   Resp 22   Ht 1.64 m (5' 4.57\")   Wt 79.4 kg (175 lb)   LMP 2024   SpO2 100%   BMI 29.51 kg/m    Const: No distress  Abd: Gravid.   Cervix: 1.5/40%/-3  FHT: 150s  Fundal Height: 36cm    Labs today: No results found for any visits on 25.    ASSESSMENT/PLAN:  39 year old , 36w0d weeks of pregnancy with YAMILE of 3/10/2025, by Last Menstrual Period, confirmed by 1st trimester US.     Encounter for supervision of other normal pregnancy in third trimester  Routine Prenatal Care  -requesting elective induction at 39 weeks  -prenatal labs reviewed   -pregnancy weight gain has been 15.4 kg to date  -declining Covid/flu. Received Tdap 2024. Did not receive RSV immunization & has now phased out   -Rubella immune  -OB US obtained . Cephalic presentation. EFW 22% percentile. No abnormal findings.   -cephalic presentation confirmed by bedside ultrasound 2024  -Follow up in 1 week   -delivery plan completed      Hyperemesis gravidarum  Seen at OB triage yesterday for worsening N/V. Found to have fever. UC and respiratory viral panel negative. No other signs of infection. Resolved s/p 1L IVF. Patient still feeling nauseous this AM. Tolerated PO intake prior to visit. Patient still taking promethazine 12.5 mg q6hrs PRN. Per ACOG " guidelines, patient now meeting criteria for methylprednisolone. Started methylpred 16 mg q8hrs for 3 days at last visit. Patient reports she just started taking it this AM. Will follow up with patient to assess improvement.      Anemia in Pregnancy  Hgb 10.0 1/16/2025. Started on oral iron. Patient tolerating oral iron. Hgb now 11.1. Continue with oral iron supplementation.      Planning for postpartum tubal ligation   Met with OB provider for consent.      GBS bacteruria   Will require intrapartum abx. Patient aware     Options for treatment and/or follow-up care were reviewed with the patient. Haleigh RASHEED Caraballo was engaged and actively involved in the decision making process. She verbalized understanding of the options discussed and was satisfied with the final plan.    Precepted with Dr. Jamaica Conway MD

## 2025-02-17 NOTE — PATIENT INSTRUCTIONS
"Delivery Plan     Take me to: [facility] Phone number: [phone]    My name is: Haleigh Caraballo  : 1985    My Doctor is: Omaira Conway MD     Prenatal care was at [facility] [phone].    39 year old         -para:   Estimated Date of Delivery: Mar 10, 2025  At 37w0d on 2025  Delivery type: TOLAC/     Patient Active Problem List   Diagnosis    Marijuana abuse    Multiple gestation with malpresentation of one fetus or more    Myopic astigmatism of both eyes     delivery    Previous  delivery affecting pregnancy    Trichomoniasis    Encounter for triage in pregnant patient    Hyperemesis gravidarum     Allergies:No Known Allergies    Lab Results:  Blood Type: O POS  GBS: Requires abx for GBS bacteruria   Obtain cord gases and send placenta for pathology due to: N/A  No results found for: \"N9LK\"  No results found for: \"LCN7\"  No results found for: \"N5UV\"  No results found for: \"N9LF\"  Hemoglobin   Date Value Ref Range Status   2025 11.1 (L) 11.7 - 15.7 g/dL Final   2025 10.0 (L) 11.7 - 15.7 g/dL Final       Last cervical check: 2025 Cervical Dilation: 2, Effacement:  40%    Immunization History   Administered Date(s) Administered    HPV Quadrivalent 2006    HPV9 2016    Hepatitis B, Adult 2013, 2016    Hepatitis B, Peds 1998, 1999    Historical Hepb 1998, 1999    Influenza (IIV3) PF 2006, 2009, 10/22/2012    Influenza (prior to ) 2009, 2012    Influenza, Split Virus, Trivalent, Pf (Fluzone\Fluarix) 2009, 2012    MMR 1998    TDAP (Adacel,Boostrix) 2006, 2013, 2024    Td (Adult), Adsorbed 1998, 2008         Immunizations needed postpartum: NONE/Up to date    Who will be present at the delivery? Unsure    Do you have a ?No If no, would you like one? No    What are your " plans for pain control? Epidural    Who will cut the umbilical cord? Unsure    Do you plan to breastfeed? Yes    If your baby is a boy, would you like him circumcised? N/A    Name of baby's clinic: [facility] [phone]    Would you like your baby to have the recommended vitamin K shot to prevent bleeding, Hepatitis B shot, and eye ointment to prevent eye infections? Yes    Next Appointment is: 1 week

## 2025-02-24 ENCOUNTER — OFFICE VISIT (OUTPATIENT)
Dept: FAMILY MEDICINE | Facility: CLINIC | Age: 40
End: 2025-02-24
Payer: COMMERCIAL

## 2025-02-24 VITALS
HEART RATE: 115 BPM | BODY MASS INDEX: 28.28 KG/M2 | HEIGHT: 66 IN | WEIGHT: 176 LBS | DIASTOLIC BLOOD PRESSURE: 61 MMHG | OXYGEN SATURATION: 100 % | SYSTOLIC BLOOD PRESSURE: 104 MMHG | RESPIRATION RATE: 20 BRPM | TEMPERATURE: 98.5 F

## 2025-02-24 DIAGNOSIS — O21.0 HYPEREMESIS GRAVIDARUM: Primary | ICD-10-CM

## 2025-02-24 DIAGNOSIS — Z34.83 ENCOUNTER FOR SUPERVISION OF OTHER NORMAL PREGNANCY IN THIRD TRIMESTER: ICD-10-CM

## 2025-02-24 RX ORDER — PROMETHAZINE HYDROCHLORIDE 12.5 MG/1
12.5 TABLET ORAL EVERY 6 HOURS PRN
Qty: 30 TABLET | Refills: 0 | Status: SHIPPED | OUTPATIENT
Start: 2025-02-24

## 2025-02-24 NOTE — PROGRESS NOTES
"SUBJECTIVE:  Haleigh Caraballo is a  at 36w0d gestation by 1st trimester US who returns today for prenatal care. Estimated Date of Delivery: Mar 10, 2025    - Concerns today: None  - Patient reports no vaginal bleeding, no contractions/severe cramping, no leakage of fluid. Contractions occasional. Fetal movement is Present.   - No nausea/vomiting. No heartburn.   - No vaginal discharge. No dysuria.   - No headache, vision changes, lower extremity swelling, upper abdominal pain, chest pain, shortness of breath.   - Mood has been stable.    OBJECTIVE:  /61 (BP Location: Right arm, Patient Position: Sitting)   Pulse 115   Temp 98.5  F (36.9  C) (Tympanic)   Resp 20   Ht 1.665 m (5' 5.55\")   Wt 79.8 kg (176 lb)   LMP 2024   SpO2 100%   BMI 28.80 kg/m    Const: No distress  Abd: Gravid.   Fundal Height: 38 cm  FHT: 140s  No lower extremity edema     Labs today: No results found for any visits on 25.    ASSESSMENT/PLAN:  39 year old , 36w0d weeks of pregnancy with YAMILE of 3/10/2025, by Last Menstrual Period, confirmed by 1st trimester US.     Encounter for supervision of other normal pregnancy in third trimester  Routine Prenatal Care  -requesting elective induction at 39 weeks  -prenatal labs reviewed   -pregnancy weight gain has been 20.4 kg to date  -declining Covid/flu. Received Tdap 2024. Did not receive RSV immunization & has now phased out   -Rubella immune  -OB US obtained . Cephalic presentation. EFW 22% percentile. No abnormal findings.   -cephalic presentation confirmed by bedside ultrasound 2025  -scheduled for elective IOL on 3/3/2025 at 7:30AM     Hyperemesis gravidarum  Much improved following completion of steroid course.   -     promethazine (PHENERGAN) 12.5 MG tablet; Take 1 tablet (12.5 mg) by mouth every 6 hours as needed for nausea.     Anemia in Pregnancy  Hgb 10.0 2025. Started on oral iron. Patient tolerating oral iron. Hgb now 11.1. " Continue with oral iron supplementation.      Planning for postpartum tubal ligation   Met with OB provider for consent.      GBS bacteruria   Will require intrapartum abx. Patient aware     Options for treatment and/or follow-up care were reviewed with the patient. Haleigh IQBAL Washington was engaged and actively involved in the decision making process. She verbalized understanding of the options discussed and was satisfied with the final plan.    Precepted with Dr. Cheyenne Conway MD

## 2025-03-03 ENCOUNTER — HOSPITAL ENCOUNTER (OUTPATIENT)
Facility: CLINIC | Age: 40
Discharge: HOME | End: 2025-03-03
Attending: STUDENT IN AN ORGANIZED HEALTH CARE EDUCATION/TRAINING PROGRAM | Admitting: STUDENT IN AN ORGANIZED HEALTH CARE EDUCATION/TRAINING PROGRAM
Payer: COMMERCIAL

## 2025-03-03 ENCOUNTER — HOSPITAL ENCOUNTER (INPATIENT)
Facility: CLINIC | Age: 40
LOS: 2 days | Discharge: HOME | End: 2025-03-05
Attending: FAMILY MEDICINE
Payer: COMMERCIAL

## 2025-03-03 VITALS
HEIGHT: 64 IN | BODY MASS INDEX: 30.73 KG/M2 | SYSTOLIC BLOOD PRESSURE: 123 MMHG | DIASTOLIC BLOOD PRESSURE: 73 MMHG | TEMPERATURE: 98.4 F | RESPIRATION RATE: 16 BRPM | WEIGHT: 180 LBS

## 2025-03-03 DIAGNOSIS — O21.0 HYPEREMESIS GRAVIDARUM: Primary | ICD-10-CM

## 2025-03-03 PROBLEM — Z34.90 ENCOUNTER FOR INDUCTION OF LABOR: Status: ACTIVE | Noted: 2025-03-03

## 2025-03-03 LAB
ABO + RH BLD: NORMAL
BASOPHILS # BLD AUTO: 0 10E3/UL (ref 0–0.2)
BASOPHILS NFR BLD AUTO: 0 %
BLD GP AB SCN SERPL QL: NEGATIVE
EOSINOPHIL # BLD AUTO: 0 10E3/UL (ref 0–0.7)
EOSINOPHIL NFR BLD AUTO: 0 %
ERYTHROCYTE [DISTWIDTH] IN BLOOD BY AUTOMATED COUNT: 14 % (ref 10–15)
HCT VFR BLD AUTO: 33.5 % (ref 35–47)
HGB BLD-MCNC: 11.7 G/DL (ref 11.7–15.7)
HOLD SPECIMEN: NORMAL
IMM GRANULOCYTES # BLD: 0.2 10E3/UL
IMM GRANULOCYTES NFR BLD: 3 %
LYMPHOCYTES # BLD AUTO: 2 10E3/UL (ref 0.8–5.3)
LYMPHOCYTES NFR BLD AUTO: 23 %
MCH RBC QN AUTO: 31.1 PG (ref 26.5–33)
MCHC RBC AUTO-ENTMCNC: 34.9 G/DL (ref 31.5–36.5)
MCV RBC AUTO: 89 FL (ref 78–100)
MONOCYTES # BLD AUTO: 0.5 10E3/UL (ref 0–1.3)
MONOCYTES NFR BLD AUTO: 6 %
NEUTROPHILS # BLD AUTO: 5.7 10E3/UL (ref 1.6–8.3)
NEUTROPHILS NFR BLD AUTO: 67 %
NRBC # BLD AUTO: 0 10E3/UL
NRBC BLD AUTO-RTO: 0 /100
PLATELET # BLD AUTO: 271 10E3/UL (ref 150–450)
RBC # BLD AUTO: 3.76 10E6/UL (ref 3.8–5.2)
SPECIMEN EXP DATE BLD: NORMAL
T PALLIDUM AB SER QL: NONREACTIVE
WBC # BLD AUTO: 8.4 10E3/UL (ref 4–11)

## 2025-03-03 PROCEDURE — 85018 HEMOGLOBIN: CPT

## 2025-03-03 PROCEDURE — 36415 COLL VENOUS BLD VENIPUNCTURE: CPT

## 2025-03-03 PROCEDURE — 85004 AUTOMATED DIFF WBC COUNT: CPT

## 2025-03-03 PROCEDURE — 258N000003 HC RX IP 258 OP 636

## 2025-03-03 PROCEDURE — 250N000011 HC RX IP 250 OP 636

## 2025-03-03 PROCEDURE — 86780 TREPONEMA PALLIDUM: CPT

## 2025-03-03 PROCEDURE — 120N000001 HC R&B MED SURG/OB

## 2025-03-03 PROCEDURE — 86900 BLOOD TYPING SEROLOGIC ABO: CPT

## 2025-03-03 PROCEDURE — 250N000013 HC RX MED GY IP 250 OP 250 PS 637

## 2025-03-03 PROCEDURE — G0463 HOSPITAL OUTPT CLINIC VISIT: HCPCS

## 2025-03-03 PROCEDURE — 86850 RBC ANTIBODY SCREEN: CPT

## 2025-03-03 RX ORDER — MORPHINE SULFATE 10 MG/ML
10 INJECTION, SOLUTION INTRAMUSCULAR; INTRAVENOUS
Status: DISCONTINUED | OUTPATIENT
Start: 2025-03-03 | End: 2025-03-04 | Stop reason: HOSPADM

## 2025-03-03 RX ORDER — OXYTOCIN 10 [USP'U]/ML
10 INJECTION, SOLUTION INTRAMUSCULAR; INTRAVENOUS
Status: DISCONTINUED | OUTPATIENT
Start: 2025-03-03 | End: 2025-03-04 | Stop reason: HOSPADM

## 2025-03-03 RX ORDER — PENICILLIN G 3000000 [IU]/50ML
3 INJECTION, SOLUTION INTRAVENOUS EVERY 4 HOURS
Status: DISCONTINUED | OUTPATIENT
Start: 2025-03-03 | End: 2025-03-04 | Stop reason: HOSPADM

## 2025-03-03 RX ORDER — METOCLOPRAMIDE HYDROCHLORIDE 5 MG/ML
10 INJECTION INTRAMUSCULAR; INTRAVENOUS EVERY 6 HOURS
Status: DISCONTINUED | OUTPATIENT
Start: 2025-03-03 | End: 2025-03-05 | Stop reason: HOSPADM

## 2025-03-03 RX ORDER — HYDROXYZINE HYDROCHLORIDE 25 MG/1
50 TABLET, FILM COATED ORAL
Status: DISCONTINUED | OUTPATIENT
Start: 2025-03-03 | End: 2025-03-04 | Stop reason: HOSPADM

## 2025-03-03 RX ORDER — TRANEXAMIC ACID 10 MG/ML
1 INJECTION, SOLUTION INTRAVENOUS EVERY 30 MIN PRN
Status: DISCONTINUED | OUTPATIENT
Start: 2025-03-03 | End: 2025-03-04 | Stop reason: HOSPADM

## 2025-03-03 RX ORDER — LOPERAMIDE HYDROCHLORIDE 2 MG/1
4 CAPSULE ORAL
Status: DISCONTINUED | OUTPATIENT
Start: 2025-03-03 | End: 2025-03-04 | Stop reason: HOSPADM

## 2025-03-03 RX ORDER — ACETAMINOPHEN 325 MG/1
650 TABLET ORAL EVERY 4 HOURS PRN
Status: DISCONTINUED | OUTPATIENT
Start: 2025-03-03 | End: 2025-03-04 | Stop reason: HOSPADM

## 2025-03-03 RX ORDER — LIDOCAINE 40 MG/G
CREAM TOPICAL
Status: DISCONTINUED | OUTPATIENT
Start: 2025-03-03 | End: 2025-03-03 | Stop reason: HOSPADM

## 2025-03-03 RX ORDER — ONDANSETRON 2 MG/ML
4 INJECTION INTRAMUSCULAR; INTRAVENOUS EVERY 6 HOURS PRN
Status: DISCONTINUED | OUTPATIENT
Start: 2025-03-03 | End: 2025-03-04 | Stop reason: HOSPADM

## 2025-03-03 RX ORDER — PROCHLORPERAZINE MALEATE 10 MG
10 TABLET ORAL EVERY 6 HOURS PRN
Status: DISCONTINUED | OUTPATIENT
Start: 2025-03-03 | End: 2025-03-04 | Stop reason: HOSPADM

## 2025-03-03 RX ORDER — METOCLOPRAMIDE HYDROCHLORIDE 5 MG/ML
10 INJECTION INTRAMUSCULAR; INTRAVENOUS EVERY 6 HOURS PRN
Status: DISCONTINUED | OUTPATIENT
Start: 2025-03-03 | End: 2025-03-04 | Stop reason: HOSPADM

## 2025-03-03 RX ORDER — OXYTOCIN 10 [USP'U]/ML
10 INJECTION, SOLUTION INTRAMUSCULAR; INTRAVENOUS
Status: DISCONTINUED | OUTPATIENT
Start: 2025-03-03 | End: 2025-03-05 | Stop reason: HOSPADM

## 2025-03-03 RX ORDER — MISOPROSTOL 200 UG/1
400 TABLET ORAL
Status: DISCONTINUED | OUTPATIENT
Start: 2025-03-03 | End: 2025-03-04 | Stop reason: HOSPADM

## 2025-03-03 RX ORDER — METOCLOPRAMIDE HYDROCHLORIDE 5 MG/ML
10 INJECTION INTRAMUSCULAR; INTRAVENOUS ONCE
Status: COMPLETED | OUTPATIENT
Start: 2025-03-03 | End: 2025-03-03

## 2025-03-03 RX ORDER — CITRIC ACID/SODIUM CITRATE 334-500MG
30 SOLUTION, ORAL ORAL
Status: DISCONTINUED | OUTPATIENT
Start: 2025-03-03 | End: 2025-03-04 | Stop reason: HOSPADM

## 2025-03-03 RX ORDER — METHYLERGONOVINE MALEATE 0.2 MG/ML
200 INJECTION INTRAVENOUS
Status: DISCONTINUED | OUTPATIENT
Start: 2025-03-03 | End: 2025-03-04 | Stop reason: HOSPADM

## 2025-03-03 RX ORDER — ONDANSETRON 4 MG/1
4 TABLET, ORALLY DISINTEGRATING ORAL EVERY 6 HOURS PRN
Status: DISCONTINUED | OUTPATIENT
Start: 2025-03-03 | End: 2025-03-04 | Stop reason: HOSPADM

## 2025-03-03 RX ORDER — LIDOCAINE 40 MG/G
CREAM TOPICAL
Status: DISCONTINUED | OUTPATIENT
Start: 2025-03-03 | End: 2025-03-05 | Stop reason: HOSPADM

## 2025-03-03 RX ORDER — KETOROLAC TROMETHAMINE 15 MG/ML
15 INJECTION, SOLUTION INTRAMUSCULAR; INTRAVENOUS
Status: DISCONTINUED | OUTPATIENT
Start: 2025-03-03 | End: 2025-03-04 | Stop reason: HOSPADM

## 2025-03-03 RX ORDER — CARBOPROST TROMETHAMINE 250 UG/ML
250 INJECTION, SOLUTION INTRAMUSCULAR
Status: DISCONTINUED | OUTPATIENT
Start: 2025-03-03 | End: 2025-03-04 | Stop reason: HOSPADM

## 2025-03-03 RX ORDER — MISOPROSTOL 200 UG/1
800 TABLET ORAL
Status: DISCONTINUED | OUTPATIENT
Start: 2025-03-03 | End: 2025-03-04 | Stop reason: HOSPADM

## 2025-03-03 RX ORDER — OXYTOCIN/0.9 % SODIUM CHLORIDE 30/500 ML
340 PLASTIC BAG, INJECTION (ML) INTRAVENOUS CONTINUOUS PRN
Status: DISCONTINUED | OUTPATIENT
Start: 2025-03-03 | End: 2025-03-04 | Stop reason: HOSPADM

## 2025-03-03 RX ORDER — LOPERAMIDE HYDROCHLORIDE 2 MG/1
2 CAPSULE ORAL
Status: DISCONTINUED | OUTPATIENT
Start: 2025-03-03 | End: 2025-03-04 | Stop reason: HOSPADM

## 2025-03-03 RX ORDER — PENICILLIN G POTASSIUM 5000000 [IU]/1
5 INJECTION, POWDER, FOR SOLUTION INTRAMUSCULAR; INTRAVENOUS ONCE
Status: COMPLETED | OUTPATIENT
Start: 2025-03-03 | End: 2025-03-04

## 2025-03-03 RX ORDER — IBUPROFEN 800 MG/1
800 TABLET, FILM COATED ORAL
Status: DISCONTINUED | OUTPATIENT
Start: 2025-03-03 | End: 2025-03-04 | Stop reason: HOSPADM

## 2025-03-03 RX ORDER — SODIUM CHLORIDE, SODIUM LACTATE, POTASSIUM CHLORIDE, CALCIUM CHLORIDE 600; 310; 30; 20 MG/100ML; MG/100ML; MG/100ML; MG/100ML
INJECTION, SOLUTION INTRAVENOUS CONTINUOUS
Status: DISCONTINUED | OUTPATIENT
Start: 2025-03-03 | End: 2025-03-03

## 2025-03-03 RX ORDER — OXYTOCIN/0.9 % SODIUM CHLORIDE 30/500 ML
100-340 PLASTIC BAG, INJECTION (ML) INTRAVENOUS CONTINUOUS PRN
Status: DISCONTINUED | OUTPATIENT
Start: 2025-03-03 | End: 2025-03-05 | Stop reason: HOSPADM

## 2025-03-03 RX ORDER — SODIUM CHLORIDE, SODIUM LACTATE, POTASSIUM CHLORIDE, CALCIUM CHLORIDE 600; 310; 30; 20 MG/100ML; MG/100ML; MG/100ML; MG/100ML
INJECTION, SOLUTION INTRAVENOUS CONTINUOUS
Status: DISCONTINUED | OUTPATIENT
Start: 2025-03-03 | End: 2025-03-04 | Stop reason: HOSPADM

## 2025-03-03 RX ORDER — METOCLOPRAMIDE 10 MG/1
10 TABLET ORAL EVERY 6 HOURS PRN
Status: DISCONTINUED | OUTPATIENT
Start: 2025-03-03 | End: 2025-03-04 | Stop reason: HOSPADM

## 2025-03-03 RX ADMIN — SODIUM CHLORIDE, SODIUM LACTATE, POTASSIUM CHLORIDE, AND CALCIUM CHLORIDE: .6; .31; .03; .02 INJECTION, SOLUTION INTRAVENOUS at 19:43

## 2025-03-03 RX ADMIN — METOCLOPRAMIDE HYDROCHLORIDE 10 MG: 5 INJECTION INTRAMUSCULAR; INTRAVENOUS at 14:33

## 2025-03-03 RX ADMIN — SODIUM CHLORIDE, SODIUM LACTATE, POTASSIUM CHLORIDE, AND CALCIUM CHLORIDE 1000 ML: .6; .31; .03; .02 INJECTION, SOLUTION INTRAVENOUS at 10:49

## 2025-03-03 RX ADMIN — HYDROXYZINE HYDROCHLORIDE 50 MG: 25 TABLET, FILM COATED ORAL at 18:45

## 2025-03-03 RX ADMIN — METOCLOPRAMIDE HYDROCHLORIDE 10 MG: 5 INJECTION INTRAMUSCULAR; INTRAVENOUS at 21:41

## 2025-03-03 ASSESSMENT — ACTIVITIES OF DAILY LIVING (ADL)
ADLS_ACUITY_SCORE: 15
ADLS_ACUITY_SCORE: 41
ADLS_ACUITY_SCORE: 15
ADLS_ACUITY_SCORE: 41
ADLS_ACUITY_SCORE: 15
ADLS_ACUITY_SCORE: 41
ADLS_ACUITY_SCORE: 41

## 2025-03-03 NOTE — PLAN OF CARE
Problem: Adult Inpatient Plan of Care  Goal: Optimal Comfort and Wellbeing  Outcome: Progressing  Goal: Readiness for Transition of Care  Outcome: Progressing  Intervention: Mutually Develop Transition Plan  Recent Flowsheet Documentation  Taken 3/3/2025 1121 by Abena Herndon RN  Equipment Currently Used at Home: none   Goal Outcome Evaluation:[facility] resident discussed plan for mechanical ripening. Patient agreeable to plan.

## 2025-03-03 NOTE — H&P
OBSTETRICS ADMISSION HISTORY & PHYSICAL  DATE OF ADMISSION: 3/3/2025  9:28 AM        Assessment and Plan:   Assessment:  Haleigh Caraballo is a 39 year old  at 39w0d not in labor. Membranes are intact. GBS status is positive.     Patient Active Problem List   Diagnosis    Marijuana abuse    Multiple gestation with malpresentation of one fetus or more    Myopic astigmatism of both eyes     delivery    Previous  delivery affecting pregnancy    Trichomoniasis    Encounter for triage in pregnant patient    Hyperemesis gravidarum    Encounter for induction of labor      PLAN:   Admit - see IP orders  IV fluids per orders  cervical ripening with cook catheter  Therapeutic sleep: morphine + hydroxyzine  Likely labor induction with low dose Pitocin  Prophylactic antibiotic for + GBS status - penicillin G ordered  Patient has completed Ob/Gyn consult for TOLAC. Will notify [facility] of admission.  Anticipate vaginal delivery  Comfort plan: wants epidural  Tubal ligation or IUD after delivery; patient still considering options  Will monitor labor progress along with RN and update attending physician  Notified PCP Dr. Omaira Conway at [facility]    Patient discussed with attending physician, Dr. Liss Mi, who agrees with the plan.     Henri Swenson MD PGY-2,  3/3/2025  [facility] Res[facility]         Chief Complaint:     Encounter for elective induction of labor       History of Present Illness:     Haleigh Caraballo is a  39 year old female at 39w0d with a current prenatal history significant for hyperemesis gravidarum and anemia who presents to OB triage with hyperemesis.  Haleigh Caraballo is a patient of Santos Harper from [facility].     Patient has had difficult to manage vomiting throughout much of her pregnancy.  She reports modest to minimal relief from ondansetron, prochlorperazine, metoclopramide,  and antihistamines.  Her PCP put her on a course of methylprednisolone which seemed quite effective but the nausea and vomiting returned after its completion.  She reports 5-6 episodes of emesis in the last 48 hours and says she is having trouble keeping down liquids.  She denies cannabis or illicit drug use.     She denies regular contractions, fluid leakage, bleeding per vagina, right upper quadrant pain, pedal edema, new headaches, persistent blurred vision, or shortness of breath. Fetal movement is normal.  Estimated Date of Delivery: Mar 10, 2025 Patient's last menstrual period was 2024.     She has not received the RSV vaccine.     Prenatal labs   Lab Results   Component Value Date    AS Negative 2024    HEPBANG Nonreactive 2024    CHPCRT Negative 2024    GCPCRT Negative 2024    HGB 11.1 (L) 2025     GBS bacteriuria was identified in early pregnancy.    Weight gain during pregnancy: 22.2 kg (49 lb)         Obstetrical History:     OB History    Para Term  AB Living   5 3 2 1 1 4   SAB IAB Ectopic Multiple Live Births   1 0 0 1 4      # Outcome Date GA Lbr Ras/2nd Weight Sex Type Anes PTL Lv   5 Current            4 SAB 2024 6w0d    SAB      3 Term  40w0d   M    ELLA   2A   32w0d   M -SEC  Y ELLA      Birth Comments: PROM, Twin gestation, delivered via    2B   32w0d   M -SEC  Y ELLA   1 Term  40w0d   M   N ELLA          Immunzations:     Most Recent Immunizations   Administered Date(s) Administered    HPV Quadrivalent 2006    HPV9 2016    Hepatitis B, Adult 2016    Hepatitis B, Peds 1999    Historical Hepb 1999    Influenza (IIV3) PF 10/22/2012    Influenza (prior to ) 2012    Influenza, Split Virus, Trivalent, Pf (Fluzone\Fluarix) 2012    MMR 1998    TDAP (Adacel,Boostrix) 2024    Td (Adult), Adsorbed 2008     Tdap this pregnancy?  YES - Date:  24  Flu shot this pregnancy?NO  COVID vaccine? NO  RSV vaccine? NO         Past Medical History:   No past medical history on file.         Past Surgical History:     Past Surgical History:   Procedure Laterality Date     SECTION       SECTION            Family History:   No family history on file.         Social History:     Tobacco Use    Smoking status: Never     Passive exposure: Never    Smokeless tobacco: Never   Substance and Sexual Activity    Alcohol use: Not Currently    Drug use: Not Currently     Types: Marijuana     Comment: stopped taking when she learned she was pregnant    Sexual activity: Not Currently     Birth control/protection: None           Medications:     Current Facility-Administered Medications   Medication Dose Route Frequency Provider Last Rate Last Admin    acetaminophen (TYLENOL) tablet 650 mg  650 mg Oral Q4H PRN Henri Swenson MD        carboprost (HEMABATE) injection 250 mcg  250 mcg Intramuscular Q15 Min PRN Henri Swenson MD        And    loperamide (IMODIUM) capsule 4 mg  4 mg Oral Once PRN Henri Swenson MD        ketorolac (TORADOL) injection 15 mg  15 mg Intravenous Once PRN Henri Swenson MD        Or    ketorolac (TORADOL) injection 15 mg  15 mg Intramuscular Once PRN Henri Swenson MD        Or    ibuprofen (ADVIL/MOTRIN) tablet 800 mg  800 mg Oral Once PRN Henri Swenson MD        lactated ringers BOLUS 500 mL  500 mL Intravenous Once PRN Henri Swenson MD        lactated ringers infusion   Intravenous Continuous Henri Swenson MD        lidocaine (LMX4) cream   Topical Q1H PRN Henri Swenson MD        lidocaine 1 % 0.1-1 mL  0.1-1 mL Other Q1H PRN Henri Swenson MD        lidocaine 1 % 0.1-20 mL  0.1-20 mL Subcutaneous Once PRN Henri Swenson MD        loperamide (IMODIUM) capsule 2 mg  2 mg Oral Q2H PRN  Henri Swenson MD        methylergonovine (METHERGINE) injection 200 mcg  200 mcg Intramuscular Q2H PRN Henri Swenson MD        metoclopramide (REGLAN) injection 10 mg  10 mg Intravenous Q6H Henri Swenson MD        metoclopramide (REGLAN) tablet 10 mg  10 mg Oral Q6H PRN Henri Swenson MD        Or    metoclopramide (REGLAN) injection 10 mg  10 mg Intravenous Q6H PRN Henri Swenson MD        misoprostol (CYTOTEC) tablet 400 mcg  400 mcg Oral ONCE PRN REPEAT PER INSTRUCTIONS Henri Swenson MD        Or    misoprostol (CYTOTEC) tablet 800 mcg  800 mcg Rectal ONCE PRN REPEAT PER INSTRUCTIONS Henri Swenson MD        ondansetron (ZOFRAN ODT) ODT tab 4 mg  4 mg Oral Q6H PRN Henri Swenson MD        Or    ondansetron (ZOFRAN) injection 4 mg  4 mg Intravenous Q6H PRN Henri Swenson MD        oxytocin (PITOCIN) 30 units in 500 mL 0.9% NaCl infusion  100-340 mL/hr Intravenous Continuous PRN Henri Swenson MD        oxytocin (PITOCIN) 30 units in 500 mL 0.9% NaCl infusion  340 mL/hr Intravenous Continuous PRN Henri Swenson MD        oxytocin (PITOCIN) injection 10 Units  10 Units Intramuscular Once PRN Henri Swenson MD        oxytocin (PITOCIN) injection 10 Units  10 Units Intramuscular Once PRN Henri Swenson MD        penicillin G potassium 5 million units vial to attach to  mL bag  5 Million Units Intravenous Once Henri Swenson MD        Followed by    penicillin G potassium 3 Million Units in D5W 50 mL intermittent infusion  3 Million Units Intravenous Q4H Henri Swenson MD        prochlorperazine (COMPAZINE) tablet 10 mg  10 mg Oral Q6H PRN Henri Swenson MD        Or    prochlorperazine (COMPAZINE) injection 10 mg  10 mg Intravenous Q6H PRN Henri Swenson MD        sodium chloride (PF) 0.9%  "PF flush 3 mL  3 mL Intracatheter Q8H Henri Swenson MD        sodium chloride (PF) 0.9% PF flush 3 mL  3 mL Intracatheter q1 min prn Henri Swenson MD        sodium citrate-citric acid (BICITRA) solution 30 mL  30 mL Oral Once PRN Henri Swenson MD        tranexamic acid 1 g in 100 mL NS IV bag (premix)  1 g Intravenous Q30 Min PRN Henri Swenson MD              Allergies:   Patient has no known allergies.         Physical Exam:   Vitals:   /68   Temp 98.8  F (37.1  C) (Oral)   Resp 18   LMP 06/03/2024   SpO2 100%   0 lbs 0 oz  Estimated body mass index is 30.9 kg/m  as calculated from the following:    Height as of an earlier encounter on 3/3/25: 1.626 m (5' 4\").    Weight as of an earlier encounter on 3/3/25: 81.6 kg (180 lb).    GEN: Awake, alert in no apparent distress   HEENT: grossly normal  RESPIRATORY: clear to auscultation bilaterally, no increased work of breathing  CARDIOVASCULAR: RRR, no murmur  ABDOMEN: gravid, nontender  Cervix: 1/50/-2 posterior and soft   EXT:  no edema or calf tenderness    Confirmed VTX by Ultrasound? yes    Electronic Fetal Monitoring:  Baseline rate normal, 140  Variability moderate  Accelerations present  Decelerations not present    Assessment: Category I EFM interpretation suggests absence of concern for fetal metabolic acidemia at this time due to accelerations present, decelerations absent, heart rate: normal baseline, and variability: moderate    Uterine Activity irregular.    Strip reviewed on unit  "

## 2025-03-03 NOTE — PROGRESS NOTES
OB Triage Note      Assessment and Plan:     Haleigh Caraballo is a 39 year old  at 39w0d not in labor. Membranes are intact. GBS status is positive.   Patient Active Problem List   Diagnosis    Marijuana abuse    Multiple gestation with malpresentation of one fetus or more    Myopic astigmatism of both eyes     delivery    Previous  delivery affecting pregnancy    Trichomoniasis    Encounter for triage in pregnant patient    Hyperemesis gravidarum     Note: Plan of care below was discussed with patient who voiced understanding and agreement, however shortly after orders were entered, the patient left.    Observation  IV metoclopramide  1 L bolus NS  BMP  Plan for IOL at [facility] later today if possible, alternatively [facility] if possible, or discharge home if indicated  TOLAC, OB consult completed, also wants tubal ligation  Prophylactic antibiotic for + GBS status     Patient discussed with attending physician, Dr. Liss Mi , who agrees with the plan.      Henri Swenson MD PGY2 3/3/2025  [facility] Residency [facility]       Subjective:     Haleigh Caraballo is a  39 year old female at 39w0d with a current prenatal history significant for hyperemesis gravidarum and anemia who presents to OB triage with hyperemesis.  Haleigh Caraballo is a patient of Santos Harper from [facility].  She was scheduled for elective IOL at [facility] today but this was postponed due to inadequate staffing.    Patient has had difficult to manage vomiting throughout much of her pregnancy.  She reports modest to minimal relief from ondansetron, prochlorperazine, metoclopramide, and antihistamines.  Her PCP put her on a course of methylprednisolone which seemed quite effective but the nausea and vomiting returned after its completion.  She reports 5-6 episodes of emesis in the last 48 hours and says she is having trouble keeping  "down liquids.  She denies cannabis or illicit drug use.    She denies regular contractions, fluid leakage, bleeding per vagina, right upper quadrant pain, pedal edema, new headaches, persistent blurred vision, or shortness of breath. Fetal movement is normal.  Estimated Date of Delivery: Mar 10, 2025 Patient's last menstrual period was 06/03/2024.     She has not received the RSV vaccine.     Prenatal labs:   Lab Results   Component Value Date    AS Negative 08/14/2024    HEPBANG Nonreactive 08/14/2024    CHPCRT Negative 12/04/2024    GCPCRT Negative 12/04/2024    HGB 11.1 (L) 02/09/2025          Physical Exam:   Vitals:   Temp 98.4  F (36.9  C) (Oral)   Ht 1.626 m (5' 4\")   Wt 81.6 kg (180 lb)   LMP 06/03/2024   BMI 30.90 kg/m    180 lbs 0 oz  Estimated body mass index is 30.9 kg/m  as calculated from the following:    Height as of this encounter: 1.626 m (5' 4\").    Weight as of this encounter: 81.6 kg (180 lb).    GEN: Awake, alert in no apparent distress   HEENT: grossly normal  RESPIRATORY: clear to auscultation bilaterally, no increased work of breathing  CARDIOVASCULAR: RRR, no murmur  ABDOMEN: gravid, nontender  Cervix: 1/50/-2 posterior and soft   EXT:  no edema or calf tenderness    FHT interpretation:  Baseline rate 145, normal  Accelerations present  Decelerations not present  Interpretation: Category I, reassuring    Labs today:  No results found for any visits on 03/03/25.    "

## 2025-03-03 NOTE — PROGRESS NOTES
"Pt has N&V.  Had 100 ml of clear mucous emesis.  States she wants to leave because \"you aren't going to do anything\".  Pt isn't maverick and desires elective induction.  Pt has hx of C/S.  SVE 1/50/-2 posterior and soft.  Pt has plans to have elective induction started at [facility] later today when they have nursing staff.  Awaiting lab and pt has orders to start IV.  Pt declines both these items and starts \"I just want to go home, I'll get induced later today at [facility]\".  When asked if she wanted to see doctor before leaving pt declined.  "

## 2025-03-03 NOTE — PROGRESS NOTES
Data: Patient presented to [facility]: 3/3/2025  6:43 AM.  Reason for maternal/fetal assessment is uterine contractions and nausea and vomiting. Patient reports nausea, this is not abnormal for this pregnancy, pt was also scheduled for induction at [facility], was told they were not able to get her in for induction this morning, then stated she has been feeling contractions and [facility] sent her over here for evaluation. Patient denies leaking of vaginal fluid/rupture of membranes, vaginal bleeding, headache, visual disturbances, epigastric or RUQ pain, significant edema. Patient reports fetal movement is normal. Patient is a 39w0d . Prenatal record reviewed. Pregnancy has been uncomplicated. Support person is not present.       Vital signs wnl. Patient reports pain and is coping.     Action: Verbal consent for EFM. Triage assessment completed. Patient may meet criteria for early labor discharge.     Response: Patient verbalized understanding of triage assessment. Will contact Dr Fisher with assessment and consideration of early labor discharge vs admission.

## 2025-03-03 NOTE — PROGRESS NOTES
"Patient returned to hospital after leaving triage this morning stating \"My Doctor said I need to come back to the hospital for fluids.\" Patient brought to triage. BFM resident notified of patients arrival.     "

## 2025-03-04 ENCOUNTER — ANESTHESIA (OUTPATIENT)
Dept: OBGYN | Facility: CLINIC | Age: 40
End: 2025-03-04
Payer: COMMERCIAL

## 2025-03-04 ENCOUNTER — ANESTHESIA EVENT (OUTPATIENT)
Dept: OBGYN | Facility: CLINIC | Age: 40
End: 2025-03-04
Payer: COMMERCIAL

## 2025-03-04 PROCEDURE — 722N000001 HC LABOR CARE VAGINAL DELIVERY SINGLE

## 2025-03-04 PROCEDURE — 250N000011 HC RX IP 250 OP 636

## 2025-03-04 PROCEDURE — 10907ZC DRAINAGE OF AMNIOTIC FLUID, THERAPEUTIC FROM PRODUCTS OF CONCEPTION, VIA NATURAL OR ARTIFICIAL OPENING: ICD-10-PCS | Performed by: STUDENT IN AN ORGANIZED HEALTH CARE EDUCATION/TRAINING PROGRAM

## 2025-03-04 PROCEDURE — 250N000011 HC RX IP 250 OP 636: Mod: JW | Performed by: ANESTHESIOLOGY

## 2025-03-04 PROCEDURE — 3E0R3BZ INTRODUCTION OF ANESTHETIC AGENT INTO SPINAL CANAL, PERCUTANEOUS APPROACH: ICD-10-PCS | Performed by: ANESTHESIOLOGY

## 2025-03-04 PROCEDURE — 250N000011 HC RX IP 250 OP 636: Performed by: ANESTHESIOLOGY

## 2025-03-04 PROCEDURE — 999N000016 HC STATISTIC ATTENDANCE AT DELIVERY

## 2025-03-04 PROCEDURE — 250N000009 HC RX 250

## 2025-03-04 PROCEDURE — 120N000001 HC R&B MED SURG/OB

## 2025-03-04 PROCEDURE — 370N000003 HC ANESTHESIA WARD SERVICE: Performed by: ANESTHESIOLOGY

## 2025-03-04 PROCEDURE — 250N000013 HC RX MED GY IP 250 OP 250 PS 637: Performed by: DIETITIAN, REGISTERED

## 2025-03-04 PROCEDURE — 00HU33Z INSERTION OF INFUSION DEVICE INTO SPINAL CANAL, PERCUTANEOUS APPROACH: ICD-10-PCS | Performed by: ANESTHESIOLOGY

## 2025-03-04 RX ORDER — MISOPROSTOL 200 UG/1
800 TABLET ORAL
Status: DISCONTINUED | OUTPATIENT
Start: 2025-03-04 | End: 2025-03-05 | Stop reason: HOSPADM

## 2025-03-04 RX ORDER — NALOXONE HYDROCHLORIDE 0.4 MG/ML
0.2 INJECTION, SOLUTION INTRAMUSCULAR; INTRAVENOUS; SUBCUTANEOUS
Status: DISCONTINUED | OUTPATIENT
Start: 2025-03-04 | End: 2025-03-05 | Stop reason: HOSPADM

## 2025-03-04 RX ORDER — LOPERAMIDE HYDROCHLORIDE 2 MG/1
2 CAPSULE ORAL
Status: DISCONTINUED | OUTPATIENT
Start: 2025-03-04 | End: 2025-03-05 | Stop reason: HOSPADM

## 2025-03-04 RX ORDER — NALOXONE HYDROCHLORIDE 0.4 MG/ML
0.4 INJECTION, SOLUTION INTRAMUSCULAR; INTRAVENOUS; SUBCUTANEOUS
Status: DISCONTINUED | OUTPATIENT
Start: 2025-03-04 | End: 2025-03-05 | Stop reason: HOSPADM

## 2025-03-04 RX ORDER — IBUPROFEN 800 MG/1
800 TABLET, FILM COATED ORAL EVERY 6 HOURS PRN
Status: DISCONTINUED | OUTPATIENT
Start: 2025-03-04 | End: 2025-03-05 | Stop reason: HOSPADM

## 2025-03-04 RX ORDER — TRANEXAMIC ACID 10 MG/ML
1 INJECTION, SOLUTION INTRAVENOUS EVERY 30 MIN PRN
Status: DISCONTINUED | OUTPATIENT
Start: 2025-03-04 | End: 2025-03-05 | Stop reason: HOSPADM

## 2025-03-04 RX ORDER — OXYTOCIN 10 [USP'U]/ML
10 INJECTION, SOLUTION INTRAMUSCULAR; INTRAVENOUS
Status: DISCONTINUED | OUTPATIENT
Start: 2025-03-04 | End: 2025-03-05 | Stop reason: HOSPADM

## 2025-03-04 RX ORDER — ACETAMINOPHEN 325 MG/1
650 TABLET ORAL EVERY 4 HOURS PRN
Status: DISCONTINUED | OUTPATIENT
Start: 2025-03-04 | End: 2025-03-05 | Stop reason: HOSPADM

## 2025-03-04 RX ORDER — EPHEDRINE SULFATE 50 MG/ML
5 INJECTION, SOLUTION INTRAMUSCULAR; INTRAVENOUS; SUBCUTANEOUS
Status: DISCONTINUED | OUTPATIENT
Start: 2025-03-04 | End: 2025-03-04 | Stop reason: HOSPADM

## 2025-03-04 RX ORDER — CARBOPROST TROMETHAMINE 250 UG/ML
250 INJECTION, SOLUTION INTRAMUSCULAR
Status: DISCONTINUED | OUTPATIENT
Start: 2025-03-04 | End: 2025-03-05 | Stop reason: HOSPADM

## 2025-03-04 RX ORDER — MISOPROSTOL 200 UG/1
400 TABLET ORAL
Status: DISCONTINUED | OUTPATIENT
Start: 2025-03-04 | End: 2025-03-05 | Stop reason: HOSPADM

## 2025-03-04 RX ORDER — AMOXICILLIN 250 MG
2 CAPSULE ORAL
Status: DISCONTINUED | OUTPATIENT
Start: 2025-03-04 | End: 2025-03-05 | Stop reason: HOSPADM

## 2025-03-04 RX ORDER — OXYTOCIN/0.9 % SODIUM CHLORIDE 30/500 ML
340 PLASTIC BAG, INJECTION (ML) INTRAVENOUS CONTINUOUS PRN
Status: DISCONTINUED | OUTPATIENT
Start: 2025-03-04 | End: 2025-03-05 | Stop reason: HOSPADM

## 2025-03-04 RX ORDER — FENTANYL/ROPIVACAINE/NS/PF 2MCG/ML-.1
PLASTIC BAG, INJECTION (ML) EPIDURAL
Status: DISCONTINUED | OUTPATIENT
Start: 2025-03-04 | End: 2025-03-04 | Stop reason: HOSPADM

## 2025-03-04 RX ORDER — OXYTOCIN/0.9 % SODIUM CHLORIDE 30/500 ML
1-24 PLASTIC BAG, INJECTION (ML) INTRAVENOUS CONTINUOUS
Status: DISCONTINUED | OUTPATIENT
Start: 2025-03-04 | End: 2025-03-04 | Stop reason: HOSPADM

## 2025-03-04 RX ORDER — NALBUPHINE HYDROCHLORIDE 10 MG/ML
2.5-5 INJECTION INTRAMUSCULAR; INTRAVENOUS; SUBCUTANEOUS EVERY 6 HOURS PRN
Status: DISCONTINUED | OUTPATIENT
Start: 2025-03-04 | End: 2025-03-05 | Stop reason: HOSPADM

## 2025-03-04 RX ORDER — LOPERAMIDE HYDROCHLORIDE 2 MG/1
4 CAPSULE ORAL
Status: DISCONTINUED | OUTPATIENT
Start: 2025-03-04 | End: 2025-03-05 | Stop reason: HOSPADM

## 2025-03-04 RX ORDER — POLYETHYLENE GLYCOL 3350 17 G/17G
17 POWDER, FOR SOLUTION ORAL DAILY PRN
Status: DISCONTINUED | OUTPATIENT
Start: 2025-03-04 | End: 2025-03-05 | Stop reason: HOSPADM

## 2025-03-04 RX ORDER — BISACODYL 10 MG
10 SUPPOSITORY, RECTAL RECTAL DAILY PRN
Status: DISCONTINUED | OUTPATIENT
Start: 2025-03-04 | End: 2025-03-05 | Stop reason: HOSPADM

## 2025-03-04 RX ORDER — METHYLERGONOVINE MALEATE 0.2 MG/ML
200 INJECTION INTRAVENOUS
Status: DISCONTINUED | OUTPATIENT
Start: 2025-03-04 | End: 2025-03-05 | Stop reason: HOSPADM

## 2025-03-04 RX ORDER — HYDROCORTISONE 25 MG/G
CREAM TOPICAL 3 TIMES DAILY PRN
Status: DISCONTINUED | OUTPATIENT
Start: 2025-03-04 | End: 2025-03-05 | Stop reason: HOSPADM

## 2025-03-04 RX ORDER — BUPIVACAINE HYDROCHLORIDE 2.5 MG/ML
INJECTION, SOLUTION EPIDURAL; INFILTRATION; INTRACAUDAL; PERINEURAL
Status: COMPLETED | OUTPATIENT
Start: 2025-03-04 | End: 2025-03-04

## 2025-03-04 RX ADMIN — METOCLOPRAMIDE HYDROCHLORIDE 10 MG: 5 INJECTION INTRAMUSCULAR; INTRAVENOUS at 04:26

## 2025-03-04 RX ADMIN — PENICILLIN G POTASSIUM 5 MILLION UNITS: 5000000 POWDER, FOR SOLUTION INTRAMUSCULAR; INTRAPLEURAL; INTRATHECAL; INTRAVENOUS at 11:08

## 2025-03-04 RX ADMIN — METOCLOPRAMIDE 10 MG: 5 INJECTION, SOLUTION INTRAMUSCULAR; INTRAVENOUS at 12:30

## 2025-03-04 RX ADMIN — PENICILLIN G 3 MILLION UNITS: 3000000 INJECTION, SOLUTION INTRAVENOUS at 15:22

## 2025-03-04 RX ADMIN — Medication 2 MILLI-UNITS/MIN: at 11:06

## 2025-03-04 RX ADMIN — IBUPROFEN 800 MG: 800 TABLET ORAL at 20:34

## 2025-03-04 RX ADMIN — BUPIVACAINE HYDROCHLORIDE 5 ML: 2.5 INJECTION, SOLUTION EPIDURAL; INFILTRATION; INTRACAUDAL at 15:05

## 2025-03-04 RX ADMIN — BUPIVACAINE HYDROCHLORIDE 8 ML: 2.5 INJECTION, SOLUTION EPIDURAL; INFILTRATION; INTRACAUDAL at 16:11

## 2025-03-04 RX ADMIN — Medication: at 15:05

## 2025-03-04 ASSESSMENT — ACTIVITIES OF DAILY LIVING (ADL)
ADLS_ACUITY_SCORE: 15

## 2025-03-04 NOTE — PLAN OF CARE
Problem: Labor  Goal: Acceptable Pain Control  Outcome: Progressing     Problem: Labor  Goal: Normal Uterine Contraction Pattern  Outcome: Progressing     Problem: Labor  Goal: Stable Fetal Wellbeing  Outcome: Progressing   Goal Outcome Evaluation:       Patient progressing in labor. Patient slept on and off throughout shift. Cervical ripening balloon placed at 1335 on 3/3, remains in place. IV infusing LR at 50 ml/hr. Continuous EFM in place. [place] in place, ctx 7-10  Labor education on-going.    Vivian Lundberg RN

## 2025-03-04 NOTE — PROGRESS NOTES
Labor Progress Note    Assessment/Plan  39 year old  at 39w1d gestational age admitted for IoL TOLAC.   - Remove Cook catheter  - Start low dose pitocin  - Continue to monitor FHR - continuous  - Anticipate     Subjective  Doing well overall. Slept well overnight. Removed Cook which was well tolerated. Discussed postpartum contraception options again.    Objective  Vital signs in last 24 hours  Temp:  [98.2  F (36.8  C)-98.8  F (37.1  C)] 98.2  F (36.8  C)  Resp:  [16-18] 17  BP: (102-126)/(55-70) 111/60  SpO2:  [97 %-100 %] 97 %      Physical Exam  General:   Abd: Gravid, soft, nontender  Cervix: 3.5cm, 60% effaced, -2 station, soft, mid position (per RN)  FHR: Baseline 140bpm/moderate variability/ accels present / no decels; Category I tracing  [place]: no regular contractions  Extremities: no peripheral edema    Pertinent Labs   Lab Results   Component Value Date    ABORH O POS 2025    HGB 11.7 2025      Patient discussed with attending physician, Dr. Liss Mi , who agrees with the plan.     Henri Swenson MD PGY2 3/4/2025  [facility]

## 2025-03-04 NOTE — PROGRESS NOTES
Haleigh Caraballo is a 39 year old  at 39w1d presenting for elective induction of labor secondary to hyperemesis.  She was started with mechanical ripening with a cook bulb (single) overnight, and progressed from 1cm to 3.5cm. Pitocin was started in the morning and titrated up to 8.  At 14:30, AROM was completed with mec present.  Patient then progressed to complete an +2 station over the next 2 hours.  Strip was category 2 with recurrent variable and late decels.  Baby delivered in ASHISH position, with tight nuchal, delivered through.  Cord had a true knot.  Cord was clamped and cut and baby brought to the warmer, with spontaneous cry and suctioned.  Placent delivered spontaneously, intact, with 3 vessel cord.  EBL was 150mL.  Pitocin given.  Uterus was firm.  Mom and baby are doing well.      I was present throughout the delivery and the 3rd stage of labor as completed by Dr. Swenson.      Liss Mi MD

## 2025-03-04 NOTE — ANESTHESIA PROCEDURE NOTES
"Epidural catheter Procedure Note    Pre-Procedure   Staff -        Anesthesiologist:  Siva San MD       Performed By: anesthesiologist       Location: OB       Procedure Start/Stop Times: 3/4/2025 2:51 PM and 3/4/2025 3:07 PM       Pre-Anesthestic Checklist: patient identified, IV checked, risks and benefits discussed, informed consent and monitors and equipment checked  Timeout:       Correct Patient: Yes        Correct Procedure: Yes        Correct Site: Yes        Correct Position: Yes   Procedure Documentation  Procedure: epidural catheter         Patient Position: sitting       Skin prep: Chloraprep       Local skin infiltrated with mL of 1% lidocaine.        Insertion Site: L3-4. (midline approach).       Technique: LORT saline        DARLYN at 5 cm.       Needle Type: ToTrendBenty needle       Needle Gauge: 18.        Needle Length (Inches): 3.5        Catheter: 20 G.          Catheter threaded easily.           Threaded 9 cm at skin.         # of attempts: 1 and  # of redirects:     Assessment/Narrative         Paresthesias: Resolved.       Test dose of 3 mL lidocaine 1.5% w/ 1:200,000 epinephrine at 15:01 CST.         Test dose negative, 3 minutes after injection, for signs of intravascular, subdural, or intrathecal injection.       Insertion/Infusion Method: LORT saline       Aspiration negative for Heme or CSF via Epidural Catheter.    Medication(s) Administered   0.25% Bupivacaine PF (Epidural) - EPIDURAL   5 mL - 3/4/2025 3:05:00 PM  Medication Administration Time: 3/4/2025 2:51 PM      FOR [facility] ([facility]) ONLY:   Pain Team Contact information: please page the Pain Team Via [employer]. Search \"Pain\". During daytime hours, please page the attending first. At night please page the resident first.      "

## 2025-03-04 NOTE — PROGRESS NOTES
Labor Progress Note    Assessment/Plan  39 year old  at 39w1d gestational age admitted for IoL TOLAC. AROM at 1430 with return of dark vitreous-appearing membranes vs. meconium followed by clear amniotic fluid.  - Continue pitocin  - Epidural; fluid bolus running now, may help with early decels  - Continue to monitor FHR  - Anticipate     Subjective  Doing well. Breathing through contractions now.     Objective  Vital signs in last 24 hours  Temp:  [98.2  F (36.8  C)-98.7  F (37.1  C)] 98.2  F (36.8  C)  Resp:  [16-18] 17  BP: (107-126)/(55-70) 109/68  SpO2:  [97 %] 97 %    Physical Exam  General:   Abd: Gravid, soft, nontender  Cervix: 5cm, 80% effaced, -1 station  FHR: Baseline 145/moderate variability/+ accels/early decels; Category II tracing.  [place]: Contractions Q 3-4 min  Extremities: no peripheral edema    Pertinent Labs   Lab Results   Component Value Date    ABORH O POS 2025    HGB 11.7 2025      Patient discussed with attending physician, Dr. Liss Mi , who agrees with the plan.     Henri Swenson MD PGY2 3/4/2025  [facility]idency [facility]

## 2025-03-04 NOTE — ANESTHESIA PREPROCEDURE EVALUATION
Anesthesia Pre-Procedure Evaluation    Patient: Haleigh Caraballo   MRN: [id number] : 1985        Procedure : * No procedures listed *          History reviewed. No pertinent past medical history.   Past Surgical History:   Procedure Laterality Date     SECTION       SECTION        No Known Allergies   Social History     Tobacco Use    Smoking status: Never     Passive exposure: Never    Smokeless tobacco: Never   Substance Use Topics    Alcohol use: Not Currently      Wt Readings from Last 1 Encounters:   25 81.6 kg (180 lb)        Anesthesia Evaluation   Pt has had prior anesthetic.     No history of anesthetic complications       ROS/MED HX  ENT/Pulmonary:  - neg pulmonary ROS     Neurologic:  - neg neurologic ROS     Cardiovascular:  - neg cardiovascular ROS     METS/Exercise Tolerance:     Hematologic:  - neg hematologic  ROS     Musculoskeletal:       GI/Hepatic:  - neg GI/hepatic ROS     Renal/Genitourinary:       Endo:  - neg endo ROS     Psychiatric/Substance Use:       Infectious Disease:       Malignancy:       Other:            Physical Exam    Airway        Mallampati: II       Respiratory Devices and Support         Dental           Cardiovascular   cardiovascular exam normal          Pulmonary   pulmonary exam normal                OUTSIDE LABS:  CBC:   Lab Results   Component Value Date    WBC 8.4 2025    WBC 7.1 2025    HGB 11.7 2025    HGB 11.1 (L) 2025    HCT 33.5 (L) 2025    HCT 34.4 (L) 2025     2025     2025     BMP:   Lab Results   Component Value Date     2025     2024    POTASSIUM 3.7 2025    POTASSIUM 3.6 2024    CHLORIDE 103 2025    CHLORIDE 102 2024    CO2 26 2025    CO2 20 (L) 2024    BUN 8.3 2025    BUN 8.8 2024    CR 0.63 2025    CR 0.62 2024    GLC 77 2025    GLC 85 2024     COAGS: No results found  "for: \"PTT\", \"INR\", \"FIBR\"  POC:   Lab Results   Component Value Date    HCG Positive (A) 01/25/2024     HEPATIC: No results found for: \"ALBUMIN\", \"PROTTOTAL\", \"ALT\", \"AST\", \"GGT\", \"ALKPHOS\", \"BILITOTAL\", \"BILIDIRECT\", \"BRIGETTE\"  OTHER:   Lab Results   Component Value Date    LACT 1.3 12/04/2024    TRACI 9.2 02/09/2025    MAG 1.7 11/18/2024    LIPASE 18 12/04/2024       Anesthesia Plan    ASA Status:  2       Anesthesia Type: Epidural.              Consents            Postoperative Care            Comments:           neg OB ROS.      ANNEMARIE US MD    I have reviewed the pertinent notes and labs in the chart from the past 30 days and (re)examined the patient.  Any updates or changes from those notes are reflected in this note.    Clinically Significant Risk Factors                                           "

## 2025-03-04 NOTE — PROGRESS NOTES
Labor Progress Note    Assessment/Plan  39 year old  at 39w0d gestational age admitted for elective IOL 2/2 continued nausea/vomiting in pregnancy. TOLAC with 3 successful vaginal deliveries. GBS positive. SVE 1/50/posterior/high on admission. FHR cat 1. [place] q12 min. Cook catheter placed at ~1300 on 3/3.    - Continue to monitor FHR  - Cook catheter, remove in AM  - Anticipate     Subjective  Lying in bed, comfortable with balloon catheter. Understands plan to keep Cook in place until tomorrow morning unless it becomes too uncomfortable overnight or falls out on its own.      Objective  Vital signs in last 24 hours  Temp:  [98.4  F (36.9  C)-98.8  F (37.1  C)] 98.6  F (37  C)  Resp:  [16-18] 17  BP: (102-126)/(64-73) 126/70  SpO2:  [97 %-100 %] 97 %      Physical Exam  General: NAD  Abd: Gravid  Cervix: 1/50/posterior/soft/high  FHR: Baseline 139/mod variability/+ accels/- decels; Category 1 tracing  [place]: Contractions Q 5-8 min    Pertinent Labs   Lab Results   Component Value Date    ABORH O POS 2025    HGB 11.7 2025        Patient discussed with attending physician, Dr. Mcmullen , who agrees with the plan.     Peyton Fisher MD PGY-1 3/3/2025  [facility]

## 2025-03-04 NOTE — PLAN OF CARE
Problem: Labor  Goal: Acceptable Pain Control  3/4/2025 0640 by Vivian Lundberg, RN  Outcome: Progressing  3/4/2025 0047 by Vivian Lundberg RN  Outcome: Progressing     Problem: Labor  Goal: Stable Fetal Wellbeing  3/4/2025 0640 by Vivian Lundberg, RN  Outcome: Progressing  3/4/2025 0047 by Vivian Lundberg, RN  Outcome: Progressing   Goal Outcome Evaluation:       Patient progressing in labor. Patient slept throughout night. Cervical ripening balloon placed at 1335 on 3/3, remains in place. IV infusing LR at 50 ml/hr. Continuous EFM in place. Ctx irregular from 8-10 all the way up to every 20 min ctx. Labor education on-going.    Plan to reassess this AM and possibly take cook balloon out this AM around 7.   Vivian Lundberg, RN

## 2025-03-04 NOTE — L&D DELIVERY NOTE
[facility] Delivery Summary  [facility]  Date of Service: 3/4/2025    Name      Haleigh Caraballo         1985  MRN       [id number]  PCP        [facility]     DELIVERY NARRATIVE    On 3/4/2025 Haleigh Caraballo delivered a viable female infant at 39w1d with apgars of 8 and 9 via , SpontaneousDelivery.  Prenatal course was notable for hyperemesis gravidarum.    Stage 1: Haleigh presented to [facility] on 3/3/2025 for induction of labor. Her group B Strep (GBS) carrier status was positive. She received 2 intrapartum doses of IV penicillin.. She had Cook catheter, low dose pitocin, and AROM for induction/augmentation. Meconium was noted following AROM. Labor course was otherwise uneventful.    Stage 2: Duration:  30 minutes.  Delivery was via , spontaneous to a sterile field under epidural anesthesia. Infant delivered in vertex left occiput anterior position. Shoulders delivered without difficulty. The baby was placed on the patient's abdomen and after brief stimulation demonstrated a spontaneous cry and vigorous tone.  No resuscitation was needed.  Cord complications: nuchal;knot. Immediate cord clamping was performed.  The cord was doubly clamped and cut 3 vessels were noted.     Stage 3: Duration: 6 minutes.  Placenta delivered intact at 3/4/2025  5:12 PM. Placental disposition was Hospital disposal. Fundal massage performed and fundus found to be firm. The following uterotonics were given: Pitocin (IV). Perineum, vagina, cervix were inspected, and the following lacerations were noted: small, bilateral vaginal. Repair was not needed. QBL: 150 mL.    Excellent hemostasis was noted. Needle and sponge count correct. Infant and patient in delivery room in good and stable condition.   _________________    GA: 39w1d  GP:   Labor Complications:  none  Additional Complications:  none  QBL:  150mL  Delivery Type: , Spontaneous  Duration  of Ruptured Membranes: 2h 43m  GBS Status: Positive   Weight: 2.75 kg (6 lb 1 oz)  Apgar scores: 8 , 9         [place], Female-Haleigh [[id number]]      Rupture date/time: 3/4/25 1423   Rupture type: Artificial Rupture of Membranes  Fluid color: Meconium  Fluid odor: Normal     Induction: Cervical Ripening Balloon, Oxytocin  Induction date/time:      Cervical ripening date/time:      Indications for induction: Elective       Delivery/Placenta Date and Time      Delivery Date: 3/4/25 Delivery Time:  5:06 PM   Placenta Date/Time: 3/4/2025  5:12 PM  Oxytocin given at the time of delivery: after delivery of baby  Delivering clinician: Henri Swenson MA   Other personnel present at delivery:  Provider Role   Liss Mi MD Bergstrom, Natalie C, RN Delivery Nurse   Mitesh Baird RN Delivery Assist             Vaginal Counts       Initial count performed by 2 team members:  Two Team Members   Leticia GARCIA MD         Needles Suture Needles Sponges (RETIRED) Instruments   Initial counts 0 0 0    Added to count       Relief counts       Final counts 0 0 0            Placed during labor Accounted for at the end of labor   FSE No NA   IUPC No NA   Cervidil No NA                  Final count performed by 2 team members:  Two Team Members   Leticia GARCIA MD      Final count correct?: Yes  Pre-Birth Team Brief: Complete  Post-Birth Team Debrief: Complete       Apgars       1 Minute 5 Minute 10 Minute 15 Minute 20 Minute   Skin color: 1  1       Heart rate: 2  2       Reflex irritability: 2  2       Muscle tone: 2  2       Respiratory effort: 1  2       Total: 8  9       Apgars assigned by: NORM ANTUNEZ APRN NNP       Cord      Vessels: 3 Vessels    Cord Complications: Nuchal, Knot   Nuchal Intervention: delivered through         Nuchal cord description: tight nuchal cord         Cord Blood Disposition: Lab    Gases Sent?: No    Delayed cord clamping?: Yes    Cord Clamping Delay (seconds):  " seconds    Stem cell collection?: No            Resuscitation    Methods: None   Care at Delivery: Requested by Dr. Mi to attend the  delivery of this term, female infant with a gestational age of 39 1/7 weeks secondary to meconium stained fluid.      Infant delivered at 1706 hours on 2025. Infant had spontaneous respirations at birth. She was placed on a warmer, dried, stimulated, and orally suction via bulb syringe for thick meconium stained fluid. Infant maintained vigorous cry and required no further resuscitation.  Apgars were 8 at one minute and 9 at five minutes of age.  Infant was placed on  mothers chest to continue to transition.   and will be transferred to the NBN for routine  care.    This resuscitation and all procedures were performed by this author.    Krista Avalos, APRN, NNP-BC     3/4/2025 5:13 PM   Advanced Practice Providers    Output in Delivery Room: Voided, Stool       [place] Measurements      Weight: 6 lb 1 oz Length: 1' 7\"     Head circumference: 32 cm    Output in delivery room: Voided, Stool       Labor Events and Shoulder Dystocia    Fetal Tracing Prior to Delivery: Category 2  Shoulder dystocia present?: Neg       Delivery (Maternal) (Provider to Complete) ([id number])    Episiotomy: None  Perineal lacerations: None      Vaginal laceration?: Yes Repaired?: No   Repair suture: None  Genital tract inspection done: Pos       Blood Loss  Mother: Haleigh Caraballo #[id number]     Start of Mother's Information      Delivery Blood Loss   Intrapartum & Postpartum: 25 0506 - 25 1734    Delivery Admission: 25 0928 - 25 1734         Intrapartum & Postpartum Delivery Admission    None                  End of Mother's Information  Mother: Haleigh Caraballo T #[id number]                Delivery - Provider to Complete ([id number])    Delivering clinician: Henri Swenson MA  Delivery Type (Choose the 1 that will go " to the Birth History): , Spontaneous                         Other personnel:  Provider Role   Liss Mi MD Bergstrom, Natalie C, RN Delivery Nurse   Mitesh Baird RN Delivery Assist                    Placenta    Date/Time: 3/4/2025  5:12 PM  Removal: Spontaneous  Disposition: Hospital disposal             Presentation and Position    Presentation: Vertex    Position: Left Occiput Anterior                Delivery was supervised by attending physician Dr. Liss Swenson MD PGY2 3/4/2025  [facility] Res[facility] Program

## 2025-03-05 VITALS
TEMPERATURE: 98.3 F | OXYGEN SATURATION: 98 % | DIASTOLIC BLOOD PRESSURE: 65 MMHG | WEIGHT: 166.8 LBS | HEIGHT: 64 IN | HEART RATE: 89 BPM | RESPIRATION RATE: 16 BRPM | SYSTOLIC BLOOD PRESSURE: 116 MMHG | BODY MASS INDEX: 28.48 KG/M2

## 2025-03-05 PROCEDURE — 250N000013 HC RX MED GY IP 250 OP 250 PS 637: Performed by: DIETITIAN, REGISTERED

## 2025-03-05 RX ORDER — ACETAMINOPHEN 500 MG
500-1000 TABLET ORAL EVERY 6 HOURS PRN
Qty: 30 TABLET | Refills: 0 | Status: SHIPPED | OUTPATIENT
Start: 2025-03-05

## 2025-03-05 RX ORDER — IBUPROFEN 600 MG/1
600 TABLET, FILM COATED ORAL EVERY 6 HOURS PRN
Qty: 30 TABLET | Refills: 0 | Status: SHIPPED | OUTPATIENT
Start: 2025-03-05

## 2025-03-05 RX ORDER — POLYETHYLENE GLYCOL 3350 17 G/17G
17 POWDER, FOR SOLUTION ORAL DAILY
Qty: 510 G | Refills: 0 | Status: SHIPPED | OUTPATIENT
Start: 2025-03-05

## 2025-03-05 RX ADMIN — PSYLLIUM HUSK 1 PACKET: 3.4 POWDER ORAL at 08:12

## 2025-03-05 RX ADMIN — IBUPROFEN 800 MG: 800 TABLET ORAL at 01:30

## 2025-03-05 RX ADMIN — IBUPROFEN 800 MG: 800 TABLET ORAL at 10:39

## 2025-03-05 RX ADMIN — ACETAMINOPHEN 650 MG: 325 TABLET, FILM COATED ORAL at 00:33

## 2025-03-05 ASSESSMENT — ACTIVITIES OF DAILY LIVING (ADL)
ADLS_ACUITY_SCORE: 15
ADLS_ACUITY_SCORE: 20
ADLS_ACUITY_SCORE: 15
ADLS_ACUITY_SCORE: 20
ADLS_ACUITY_SCORE: 15
ADLS_ACUITY_SCORE: 20
ADLS_ACUITY_SCORE: 20
ADLS_ACUITY_SCORE: 15
ADLS_ACUITY_SCORE: 20
ADLS_ACUITY_SCORE: 15
ADLS_ACUITY_SCORE: 15
ADLS_ACUITY_SCORE: 20

## 2025-03-05 NOTE — PROGRESS NOTES
"POST-PARTUM PROGRESS NOTE    Assessment and Plan: Patient is a 39 year old year old  admitted on 3/3/2025. She delivered a healthy infant via , Spontaneous on 3/4/2025 at 5:06 PM. Patient is doing well overall and has no concerns.   Postpartum day 1  Normal postpartum course.  Continue to encourage and assist her with breastfeeding and routine  cares  Continue ibuprofen and Tylenol for pain control  Encourage ambulation  Anticipate discharge within 24 hours.       Patient discussed with attending physician, Dr. Lucio Mckeon, who agrees with the plan.    Sofi Henning DO, PGY-1,  3/5/2025  [facility]    Subjective:  The patient feels well. She is bonding with the infant. Voiding without difficulty, lochia normal, tolerating normal diet, and passing flatus. Pain is mostly located near her vagina and it is well controlled with current medications. Patient notes of improving vaginal bleeding. The patient has no emotional concerns to report at this time. The baby is well and being fed by bottle.    Objective:  /70 (BP Location: Left arm, Patient Position: Semi-Fleming's, Cuff Size: Adult Regular)   Pulse 79   Temp 97.8  F (36.6  C) (Oral)   Resp 18   Ht 1.626 m (5' 4\")   Wt 81.6 kg (180 lb)   LMP 2024   SpO2 98%   Breastfeeding Unknown   BMI 30.90 kg/m    GEN: NAD  CV: RRR, no murmurs  PULM: CTAB  ABD: Fundus firm and 2 cm below umbilicus, NTTP  EXT: Warm, dry and well perfused. No edema. Calves NTTP and equal.     Prenatal Labs:   Lab Results   Component Value Date    AS Negative 2025    HEPBANG Nonreactive 2024    CHPCRT Negative 2024    GCPCRT Negative 2024    HGB 11.7 2025     Hemoglobin   Date Value Ref Range Status   2025 11.7 11.7 - 15.7 g/dL Final   2025 11.1 (L) 11.7 - 15.7 g/dL Final        Immunization History   Administered Date(s) Administered    HPV Quadrivalent 2006 "    HPV9 04/27/2016    Hepatitis B, Adult 12/18/2013, 04/27/2016    Hepatitis B, Peds 08/19/1998, 09/01/1999    Historical Hepb 08/19/1998, 09/01/1999    Influenza (IIV3) PF 11/07/2006, 12/12/2009, 10/22/2012    Influenza (prior to 2024) 12/12/2009, 12/11/2012    Influenza, Split Virus, Trivalent, Pf (Fluzone\Fluarix) 12/12/2009, 12/11/2012    MMR 08/19/1998    TDAP (Adacel,Boostrix) 11/07/2006, 05/07/2013, 12/06/2024    Td (Adult), Adsorbed 08/19/1998, 02/24/2008

## 2025-03-05 NOTE — PLAN OF CARE
Problem: Postpartum (Vaginal Delivery)  Goal: Optimal Pain Control and Function  Outcome: Progressing     Problem: Postpartum (Vaginal Delivery)  Goal: Absence of Infection Signs and Symptoms  Outcome: Progressing     Problem: Postpartum (Vaginal Delivery)  Goal: Successful Parent Role Transition  Outcome: Progressing     Problem: Postpartum (Vaginal Delivery)  Goal: Effective Urinary Elimination  Outcome: Progressing   Goal Outcome Evaluation:      Plan of Care Reviewed With: patient      VSS. Pt reported 6/10 pain and received PRN ibuprofen and tylenol. Fundus firm, no clots noted or reported. Pt formula feeding infant. Pt fed infant x1 this shift. Pt encouraged to be more independent in infant cares.

## 2025-03-05 NOTE — PLAN OF CARE
Problem: Postpartum (Vaginal Delivery)  Goal: Optimal Pain Control and Function  Outcome: Progressing     Problem: Postpartum (Vaginal Delivery)  Goal: Hemostasis  Outcome: Progressing     Problem: Postpartum (Vaginal Delivery)  Goal: Absence of Infection Signs and Symptoms  Outcome: Progressing   Goal Outcome Evaluation:

## 2025-03-05 NOTE — DISCHARGE INSTRUCTIONS
Warning Signs after Having a Baby    Keep this paper on your fridge or somewhere else where you can see it.    Call your provider if you have any of these symptoms up to 12 weeks after having your baby.    Thoughts of hurting yourself or your baby  Pain in your chest or trouble breathing  Severe headache not helped by pain medicine  Eyesight concerns (blurry vision, seeing spots or flashes of light, other changes to eyesight)  Fainting, shaking or other signs of a seizure    Call 9-1-1 if you feel that it is an emergency.     The symptoms below can happen to anyone after giving birth. They can be very serious. Call your provider if you have any of these warning signs.    My provider s phone number: _______________________    Losing too much blood (hemorrhage)    Call your provider if you soak through a pad in less than an hour or pass blood clots bigger than a golf ball. These may be signs that you are bleeding too much.    Blood clots in the legs or lungs    After you give birth, your body naturally clots its blood to help prevent blood loss. Sometimes this increased clotting can happen in other areas of the body, like the legs or lungs. This can block your blood flow and be very dangerous.     Call your provider if you:  Have a red, swollen spot on the back of your leg that is warm or painful when you touch it.   Are coughing up blood.     Infection    Call your provider if you have any of these symptoms:  Fever of 100.4 F (38 C) or higher.  Pain or redness around your stitches if you had an incision.   Any yellow, white, or green fluid coming from places where you had stitches or surgery.    Mood Problems (postpartum depression)    Many people feel sad or have mood changes after having a baby. But for some people, these mood swings are worse.     Call your provider right away if you feel so anxious or nervous that you can't care for yourself or your baby.    Preeclampsia (high blood pressure)    Even if you  didn't have high blood pressure when you were pregnant, you are at risk for the high blood pressure disease called preeclampsia. This risk can last up to 12 weeks after giving birth.     Call your provider if you have:   Pain on your right side under your rib cage  Sudden swelling in the hands and face    Remember: You know your body. If something doesn't feel right, get medical help.     For informational purposes only. Not to replace the advice of your health care provider. Copyright 2020 [facility]. All rights reserved. Clinically reviewed by Nicole Nam, RNC-OB, MSN. [employer] 860674 - Rev 02/23.            Engorgement Plan of Care When Mother Does Not Wish to Breastfeed    Wear a snug bra 24 hours a day.  Ice may be applied to breasts for comfort, as often as desired. Use a light layer of cloth between skin and ice pack.  Stimulate your breasts as little as possible (more stimulation of breasts tells your body to make more milk).  You may hand express or pump briefly if very uncomfortable.  Express only enough milk to help you feel more comfortable. The more milk you remove, the more milk your breasts will make.  Use of an anti-inflammatory over the counter medication, or as prescribed by your provider, may help to decrease your swelling.      Cabbage Leaves and Compresses    Cabbage leaves may also be used to help decrease engorgement.      Use green cabbage   Crush the cabbage leaves with a rolling pin or the flat bottom of a saucepan (just smash the leaf so it becomes flexible enough to bend around your breast).   Wrap the cabbage leaves around the breast and leave on for at least 20 minutes as often as desired.     Some women get a large lump in the armpit about 3 or 4 days after the baby s birth. Cabbage leaves may be used in that area as well.       After Your Delivery (the Postpartum Period): Care Instructions  Overview     After childbirth (postpartum period), your body goes through  "many changes as you recover. In these weeks after delivery, try to take good care of yourself. Get rest whenever you can and accept help from others.  It may take 4 to 6 weeks to feel like yourself again, and possibly longer if you had a  birth. You may feel sore or very tired as you recover. After delivery, you may continue to have contractions as the uterus returns to the size it was before your pregnancy. You will also have some vaginal bleeding. And you may have pain around the vagina as you heal. Several days after delivery you may also have pain and swelling in your breasts as they fill with milk. There are things you can do at home to help ease these discomforts.  After childbirth, it's common to feel emotional. You may feel irritable, cry easily, and feel happy one minute and sad the next. This is called the \"baby blues.\" Hormone changes are one cause of these emotional changes. These feelings usually get better within a couple of weeks. If they don't, talk to your doctor or midwife.  In the first couple of weeks after you give birth, your doctor or midwife may want to check in with you and make a plan for follow-up care. You will likely have a complete postpartum visit in the first 3 months after delivery. At that time, your doctor or midwife will check on your recovery and see how you're doing. But if you have questions or concerns before then, you can always call your doctor or midwife.  Follow-up care is a key part of your treatment and safety. Be sure to make and go to all appointments, and call your doctor if you are having problems. It's also a good idea to know your test results and keep a list of the medicines you take.  How can you care for yourself at home?  Taking care of your body  Use pads instead of tampons for bleeding. After birth, you will have bloody vaginal discharge. You may also pass some blood clots that shouldn't be bigger than an egg. Over the next 6 weeks or so, your " bleeding should decrease a little every day and slowly change to a pinkish and then whitish discharge.  For cramps or mild pain, try an over-the-counter pain medicine, such as acetaminophen (Tylenol) or ibuprofen (Advil, Motrin). Read and follow all instructions on the label.  To ease pain around the vagina or from hemorrhoids:  Put ice or a cold pack on the area for 10 to 20 minutes at a time. Put a thin cloth between the ice and your skin.  Try sitting in a few inches of warm water (sitz bath) when you can or after bowel movements.  Clean yourself with a gentle squeeze of warm water from a bottle instead of wiping with toilet paper.  Use witch hazel or hemorrhoid pads (such as Tucks).  Try using a cold compress for sore and swollen breasts. And wear a supportive bra that fits.  Ease constipation by drinking plenty of fluids and eating high-fiber foods. Ask your doctor or midwife about over-the-counter stool softeners.  Activity  Rest when you can.  Ask for help from family or friends when you need it.  If you can, have another adult in your home for at least 2 or 3 days after birth.  When you feel ready, try to get some exercise every day. For many people, walking is a good choice. Don't do any heavy exercise until your doctor or midwife says it's okay.  Ask your doctor or midwife when it is okay to have vaginal sex.  If you don't want to get pregnant, talk to your doctor or midwife about birth control options. You can get pregnant even before your period returns. Also, you can get pregnant while you are breastfeeding.  Talk to your doctor or midwife if you want to get pregnant again. They can talk to you about when it is safe.  Emotional health  It's normal to have some sadness, anxiety, and mood swings after delivery. It may help to talk with a trusted friend or family member. You can also call the Maternal Mental Health Hotline at 9-756-[facility] (1-739.557.9216) for support. If these mood changes last more than  a couple of weeks, talk to your doctor or midwife.  When should you call for help?  Share this information with your partner, family, or a friend. They can help you watch for warning signs.  Call 911  anytime you think you may need emergency care. For example, call if:    You feel you cannot stop from hurting yourself, your baby, or someone else.     You passed out (lost consciousness).     You have chest pain, are short of breath, or cough up blood.     You have a seizure.   Where to get help 24 hours a day, 7 days a week   If you or someone you know talks about suicide, self-harm, a mental health crisis, a substance use crisis, or any other kind of emotional distress, get help right away. You can:    Call the Suicide and Crisis Lifeline at 988.     Call 6-655-677-TALK (1-679.529.9173).     Text HOME to 826527 to access the Crisis Text Line.   Consider saving these numbers in your phone.  Go to [employer] for more information or to chat online.  Call your doctor or midwife now or seek immediate medical care if:    You have signs of hemorrhage (too much bleeding), such as:  Heavy vaginal bleeding. This means that you are soaking through one or more pads in an hour. Or you pass blood clots bigger than an egg.  Feeling dizzy or lightheaded, or you feel like you may faint.  Feeling so tired or weak that you cannot do your usual activities.  A fast or irregular heartbeat.  New or worse belly pain.     You have signs of infection, such as:  A fever.  Increased pain, swelling, warmth, or redness from an incision or wound.  Frequent or painful urination or blood in your urine.  Vaginal discharge that smells bad.  New or worse belly pain.     You have symptoms of a blood clot in your leg (called a deep vein thrombosis), such as:  Pain in the calf, back of the knee, thigh, or groin.  Swelling in the leg or groin.  A color change on the leg or groin. The skin may be reddish or purplish, depending on your usual skin  "color.     You have signs of preeclampsia, such as:  Sudden swelling of your face, hands, or feet.  New vision problems (such as dimness, blurring, or seeing spots).  A severe headache.     You have signs of heart failure, such as:  New or increased shortness of breath.  New or worse swelling in your legs, ankles, or feet.  Sudden weight gain, such as more than 2 to 3 pounds in a day or 5 pounds in a week.  Feeling so tired or weak that you cannot do your usual activities.     You had spinal or epidural pain relief and have:  New or worse back pain.  Increased pain, swelling, warmth, or redness at the injection site.  Tingling, weakness, or numbness in your legs or groin.   Watch closely for changes in your health, and be sure to contact your doctor or midwife if:    Your vaginal bleeding isn't decreasing.     You feel sad, anxious, or hopeless for more than a few days.     You are having problems with your breasts or breastfeeding.   Where can you learn more?  Go to https://www.[employer].net/patiented  Enter A461 in the search box to learn more about \"After Your Delivery (the Postpartum Period): Care Instructions.\"  Current as of: April 30, 2024  Content Version: 14.3    2024 [facility].   Care instructions adapted under license by your healthcare professional. If you have questions about a medical condition or this instruction, always ask your healthcare professional. [facility] disclaims any warranty or liability for your use of this information.    "

## 2025-03-05 NOTE — ANESTHESIA POSTPROCEDURE EVALUATION
Patient: Haleigh Caraballo    Procedure: * No procedures listed *       Anesthesia Type:  Epidural    Note:  Disposition: Inpatient   Postop Pain Control: Uneventful            Sign Out: Well controlled pain   PONV: No   Neuro/Psych: Uneventful            Sign Out: Acceptable/Baseline neuro status   Airway/Respiratory: Uneventful            Sign Out: Acceptable/Baseline resp. status   CV/Hemodynamics: Uneventful            Sign Out: Acceptable CV status; No obvious hypovolemia; No obvious fluid overload   Other NRE: NONE   DID A NON-ROUTINE EVENT OCCUR? No           Last vitals:  Vitals:    03/04/25 1851 03/04/25 1906 03/04/25 1921   BP: 120/62 119/61 112/66   Resp:      Temp:      SpO2:          Electronically Signed By: Kenny Hughes MD  March 4, 2025  8:51 PM

## 2025-03-06 NOTE — DISCHARGE SUMMARY
Post-partum Discharge Summary    Haleigh Caraballo MRN# [id number]   Age: 39 year old YOB: 1985     Date of Admission:  3/3/2025  Date of Discharge::  3/5/2025  Admitting Physician:  Henri Swenson MD  Discharge Physician:  Dr. Peyton Fisher     Home clinic: [facility]            Admission Diagnoses:   Encounter for induction of labor [Z34.90]          Discharge Diagnosis:     Normal spontaneous vaginal delivery  Vaginal delivery after   Patient Active Problem List   Diagnosis    Marijuana abuse    Multiple gestation with malpresentation of one fetus or more    Myopic astigmatism of both eyes     delivery    Previous  delivery affecting pregnancy    Trichomoniasis    Encounter for triage in pregnant patient    Hyperemesis gravidarum    Encounter for induction of labor          Procedures:     Procedure(s): No additional procedures performed              Medications Prior to Admission:     Medications Prior to Admission   Medication Sig Dispense Refill Last Dose/Taking    ferrous sulfate (FEROSUL) 325 (65 Fe) MG tablet Take 1 tablet (325 mg) by mouth daily (with breakfast). 90 tablet 0 Taking    [DISCONTINUED] promethazine (PHENERGAN) 12.5 MG tablet Take 1 tablet (12.5 mg) by mouth every 6 hours as needed for nausea. 30 tablet 0              Discharge Medications:     Current Discharge Medication List        CONTINUE these medications which have NOT CHANGED    Details   ferrous sulfate (FEROSUL) 325 (65 Fe) MG tablet Take 1 tablet (325 mg) by mouth daily (with breakfast).  Qty: 90 tablet, Refills: 0    Associated Diagnoses: Anemia in pregnancy, third trimester      promethazine (PHENERGAN) 12.5 MG tablet Take 1 tablet (12.5 mg) by mouth every 6 hours as needed for nausea.  Qty: 30 tablet, Refills: 0    Associated Diagnoses: Hyperemesis gravidarum                   Consultations:   No consultations were requested during this admission           Brief History of Labor:   On 3/4/2025 at 5:06 PM, this 39 year old year old  under Epidural analgesia delivered a viable female infant weighing 6 lbs 1 oz with APGAR scores below:  APGARs 1 Min 5Min 10Min   Totals: 8   9                 Hospital Course (HPI):   The patient's hospital course was unremarkable. She was induced due to ongoing nausea 2/2 hyperemesis gravidarum. On discharge, her pain was well controlled. Vaginal bleeding is decreased.  Voiding without difficulty.  Ambulating well and tolerating a normal diet.  No fever.     Infant is stable. Feeding Method: Formula    She was discharged on post-partum day #1.   Contraception plan: Depo Provera  Post partum depression education was provided.         D/C Physical Exam:     Vitals:    25 0345 25 0809 25 1243 25 1649   BP: 127/70 123/72 111/63 116/65   BP Location: Left arm Right arm Right arm Right arm   Patient Position: Semi-Fleming's Semi-Fleming's Semi-Fleming's Semi-Fleming's   Cuff Size: Adult Regular Adult Regular Adult Regular Adult Regular   Pulse:  76 71 89   Resp:  16 16 16   Temp: 97.8  F (36.6  C) 97.7  F (36.5  C) 97.6  F (36.4  C) 98.3  F (36.8  C)   TempSrc: Oral Oral Oral Oral   SpO2: 98%      Weight:   75.7 kg (166 lb 12.8 oz)    Height:         GENERAL:         healthy, alert, and no distress  RESPIRATORY:   No increased work of breathing, clear to auscultation bilaterally   CARDIOVASCULAR:   Normal regular rate and rhythm, normal S1 and S2, no S3 or S4, and no murmur noted   ABDOMEN:   Fundal height 2cm below umbilicus.  Firm and non tender.   EXTREMITIES:          No edema, non-tender     Post-partum hemoglobin:   Hemoglobin   Date Value Ref Range Status   2025 11.7 11.7 - 15.7 g/dL Final           Discharge Instructions and Follow-Up:     Discharge diet: Regular   Discharge activity: Activity as tolerated   Discharge follow-up: Follow up with primary care provider in 2 and 6 weeks   Wound care: Drink  plenty of fluids          Discharge Disposition:     Discharged to home        Patient discussed with attending physician, Dr. Liss Mi , who agrees with the plan.    Peyton Fisher MD PGY-1  3/5/2025  [facility]    Name:  Haleigh Caraballo  :  1985  MRN:  [id number]

## 2025-03-06 NOTE — PLAN OF CARE
Goal Outcome Evaluation:       Pt bonding with baby and vitals stable. Her fundus is midline and firm without massage and bleeding is scant. Her pain is well managed with prn tylenol and ibuprofen. She is formula feeding baby.     Discharge education given to pt and questions answered. Pt confirms understanding of discharge teaching. Pt was walked to front door by nurse and went home with baby.     Problem: Adult Inpatient Plan of Care  Goal: Optimal Comfort and Wellbeing  3/5/2025 1817 by Any Rudd RN  Outcome: Adequate for Care Transition  3/5/2025 1749 by Any Rudd RN  Outcome: Progressing  3/5/2025 0828 by Any Rudd RN  Outcome: Progressing  Intervention: Monitor Pain and Promote Comfort  Recent Flowsheet Documentation  Taken 3/5/2025 0809 by Any Rudd RN  Pain Management Interventions:   repositioned   tub bath  Intervention: Provide Person-Centered Care  Recent Flowsheet Documentation  Taken 3/5/2025 1649 by Any Rudd RN  Trust Relationship/Rapport:   care explained   choices provided   emotional support provided   empathic listening provided   questions answered   questions encouraged   reassurance provided   thoughts/feelings acknowledged  Taken 3/5/2025 0809 by Any Rudd RN  Trust Relationship/Rapport:   care explained   choices provided   emotional support provided   empathic listening provided   questions answered   questions encouraged   reassurance provided   thoughts/feelings acknowledged     Problem: Postpartum (Vaginal Delivery)  Goal: Successful Parent Role Transition  3/5/2025 1817 by Any Rudd, RN  Outcome: Adequate for Care Transition  3/5/2025 1749 by Any Rudd, RN  Outcome: Progressing  3/5/2025 0828 by Any Rudd RN  Outcome: Progressing     Problem: Postpartum (Vaginal Delivery)  Goal: Optimal Pain Control and Function  3/5/2025 1817 by Any Rudd, RN  Outcome: Adequate for Care Transition  3/5/2025 1749 by Any Rudd, RN  Outcome:  Progressing  3/5/2025 0828 by Any Rudd, RN  Outcome: Progressing  Intervention: Prevent or Manage Pain  Recent Flowsheet Documentation  Taken 3/5/2025 1649 by Any Rudd, RN  Perineal Care: sitz bath taken  Taken 3/5/2025 0809 by Any Rudd, RN  Pain Management Interventions:   repositioned   tub bath  Perineal Care: sitz bath taken     Problem: Postpartum (Vaginal Delivery)  Goal: Effective Urinary Elimination  3/5/2025 1817 by Any Rudd, RN  Outcome: Adequate for Care Transition  3/5/2025 1749 by Any Rudd, RN  Outcome: Progressing  3/5/2025 0828 by Any Rudd, RN  Outcome: Progressing

## 2025-04-02 ENCOUNTER — MEDICAL CORRESPONDENCE (OUTPATIENT)
Dept: HEALTH INFORMATION MANAGEMENT | Facility: CLINIC | Age: 40
End: 2025-04-02
Payer: COMMERCIAL

## 2025-04-08 ENCOUNTER — OFFICE VISIT (OUTPATIENT)
Dept: FAMILY MEDICINE | Facility: CLINIC | Age: 40
End: 2025-04-08
Payer: COMMERCIAL

## 2025-04-08 VITALS
RESPIRATION RATE: 18 BRPM | BODY MASS INDEX: 26.51 KG/M2 | TEMPERATURE: 98.4 F | OXYGEN SATURATION: 100 % | HEIGHT: 65 IN | SYSTOLIC BLOOD PRESSURE: 130 MMHG | HEART RATE: 90 BPM | DIASTOLIC BLOOD PRESSURE: 82 MMHG | WEIGHT: 159.08 LBS

## 2025-04-08 DIAGNOSIS — Z30.09 BIRTH CONTROL COUNSELING: ICD-10-CM

## 2025-04-08 PROCEDURE — 96372 THER/PROPH/DIAG INJ SC/IM: CPT

## 2025-04-08 PROCEDURE — 99207 PR POST PARTUM EXAM: CPT | Mod: 25

## 2025-04-08 RX ORDER — MEDROXYPROGESTERONE ACETATE 150 MG/ML
150 INJECTION, SUSPENSION INTRAMUSCULAR
Status: ACTIVE | OUTPATIENT
Start: 2025-04-08 | End: 2026-04-03

## 2025-04-08 RX ADMIN — MEDROXYPROGESTERONE ACETATE 150 MG: 150 INJECTION, SUSPENSION INTRAMUSCULAR at 12:47

## 2025-04-08 NOTE — NURSING NOTE
Clinic Administered Medication Documentation        Patient was given Depo Provera. Prior to medication administration, verified patient's identity using patient s name and date of birth. Please see MAR and medication order for additional information. Patient instructed to remain in clinic for 15 minutes and report any adverse reaction to staff immediately.    Vial/Syringe: Single dose vial. Was entire vial of medication used? Yes      Name of provider who requested the medication administration: Bernardo  Name of provider on site (faculty or community preceptor) at the time of performing the medication administration: Radha    Date of next administration: NEXT INJECTION DUE: 6/24/25 - 7/22/25  Date of next office visit with provider to renew medication plan (must be seen annually): 4/8/26

## 2025-04-08 NOTE — PROGRESS NOTES
"  Assessment & Plan     (Z39.2) Encounter for postpartum visit  (primary encounter diagnosis)  Comment: feels overall well and is one month postpartum. Scoring negative on edinburgh screening. Lives alone with her 4 kids and has no support here as family lives out of town. No hx of postpartum depression. Discussed continuing prenatal vitamins.   Plan: continue prenatal vitamins    (Z30.09) Birth control counseling  Comment: patient is interested in Depo injection. LMP 4/7 yesterday. Patient reports trying IUD in the past and not interested, considered tubal ligation but no longer interested. Today she is considering nexplanon in the future after we went over options. Discussed side effects of Depo including weight gain and bone loss with long term use.  Plan: medroxyPROGESTERone (DEPO-PROVERA) injection         150 mg             Subjective   Haleigh is a 39 year old, presenting for the following health issues:  Contraception (Pt is here to discuss about birth control and post partum. No concern. )      4/8/2025    11:23 AM   Additional Questions   Roomed by Hser   Accompanied by Daughter         4/8/2025    Information    services provided? No     HPI    Postpartum visit one month  Overall feeling good  No family/partner support  Lives alone with 4 kids 15 yo twins,11 year old and baby  Wants Depo   LMP: 4/7  Tried IUD in the past --not interested    Review of Systems  Constitutional, HEENT, cardiovascular, pulmonary, gi and gu systems are negative, except as otherwise noted.      Objective    /82   Pulse 90   Temp 98.4  F (36.9  C) (Oral)   Resp 18   Ht 1.64 m (5' 4.57\")   Wt 72.2 kg (159 lb 1.3 oz)   LMP 06/03/2024   SpO2 100%   Breastfeeding No   BMI 26.83 kg/m    Body mass index is 26.83 kg/m .  Physical Exam   GENERAL: alert and no distress  RESP: lungs clear to auscultation - no rales, rhonchi or wheezes  CV: regular rate and rhythm, normal S1 S2, no murmur  MS: no gross " musculoskeletal defects noted, no edema        Signed Electronically by: BASSEM Lind  {Email feedback regarding this note to [email or web address]   :[id number]}

## 2025-04-18 ENCOUNTER — OFFICE VISIT (OUTPATIENT)
Dept: FAMILY MEDICINE | Facility: CLINIC | Age: 40
End: 2025-04-18
Payer: COMMERCIAL

## 2025-04-18 VITALS
WEIGHT: 160.12 LBS | HEIGHT: 65 IN | BODY MASS INDEX: 26.68 KG/M2 | TEMPERATURE: 99.1 F | RESPIRATION RATE: 20 BRPM | SYSTOLIC BLOOD PRESSURE: 113 MMHG | DIASTOLIC BLOOD PRESSURE: 77 MMHG | OXYGEN SATURATION: 100 % | HEART RATE: 74 BPM

## 2025-04-18 DIAGNOSIS — Z30.09 ENCOUNTER FOR COUNSELING REGARDING CONTRACEPTION: Primary | ICD-10-CM

## 2025-04-18 PROCEDURE — 3078F DIAST BP <80 MM HG: CPT

## 2025-04-18 PROCEDURE — 99213 OFFICE O/P EST LOW 20 MIN: CPT | Mod: GC

## 2025-04-18 PROCEDURE — 3074F SYST BP LT 130 MM HG: CPT

## 2025-04-18 NOTE — PROGRESS NOTES
"  Assessment & Plan     Encounter for counseling regarding contraception  Patient currently 7 weeks postpartum presents to the clinic for intermittent vaginal bleeding in the setting of recent Depo-provera injection on 4/8/2025. Reassurance provided. Discussed alternative methods of contraception if patient unhappy with depo-provera injection. Patient is not planning on having more children, is not interested in tubal ligation due to concern about requiring sedation. Recommended either another depo injection when due vs. Mirena IUD. Patient voiced understanding of options. Patient will wait until she is due for next depo-provera injection and decide at that point. Patient is due for pap smear, so will obtain that at time of IUD if that is what patient chooses.     BMI  Estimated body mass index is 26.36 kg/m  as calculated from the following:    Height as of this encounter: 1.66 m (5' 5.35\").    Weight as of this encounter: 72.6 kg (160 lb 1.9 oz).       Sharon Ricardo is a 39 year old, presenting for the following health issues:  Contraception (Bleeding)    -7 weeks postpartum  -had depo shot 4/8  -now having intermittent bleeding  -bleeding worse after  intercourse   -waited 6 weeks before having intercourse   -had bleeding with depo shot previously   -has previously used Nexplanon, contraceptive patch, and IUD    -no vaginal discharge, pain with intercourse        Objective    /77 (BP Location: Right arm, Patient Position: Sitting, Cuff Size: Adult Regular)   Pulse 74   Temp 99.1  F (37.3  C) (Oral)   Resp 20   Ht 1.66 m (5' 5.35\")   Wt 72.6 kg (160 lb 1.9 oz)   LMP 06/03/2024   SpO2 100%   Breastfeeding No   BMI 26.36 kg/m    Body mass index is 26.36 kg/m .  Physical Exam   GENERAL: healthy, alert and no distress  RESP: speaking in full sentences, normal work of breathing   CV: extremities well perfused  ABDOMEN: soft, nontender  MS: no gross musculoskeletal defects noted, no edema  PSYCH: " mentation appears normal, affect normal/bright             Signed Electronically by: Omaira Conway MD

## 2025-04-22 ENCOUNTER — TELEPHONE (OUTPATIENT)
Dept: FAMILY MEDICINE | Facility: CLINIC | Age: 40
End: 2025-04-22
Payer: COMMERCIAL

## 2025-04-22 NOTE — TELEPHONE ENCOUNTER
New Medication Request        What medication are you requesting?: Birth Control Pills    Reason for medication request: Patient mentioned ws discussed at last visit with provider Omaira Conway. Please place a prescription for patient, thank you!

## 2025-05-09 ENCOUNTER — MEDICAL CORRESPONDENCE (OUTPATIENT)
Dept: HEALTH INFORMATION MANAGEMENT | Facility: CLINIC | Age: 40
End: 2025-05-09
Payer: COMMERCIAL

## 2025-05-29 ENCOUNTER — TELEPHONE (OUTPATIENT)
Dept: FAMILY MEDICINE | Facility: CLINIC | Age: 40
End: 2025-05-29
Payer: COMMERCIAL

## 2025-05-29 NOTE — TELEPHONE ENCOUNTER
----- Message from Jacqueline CONTRERAS sent at 5/28/2025  3:31 PM CDT -----  Regarding: RE: Orders  Hello,    Thank you very much for your response!     I am still seeing the IARs, so I am adding in my nurse manager for how to proceed.    Thank you,    Jacqueline LAUGHLIN  Clinic Coordinator  [facility] - [address] [place] [address] Scheduling   [phone] (Option 3, Option 2)  ----- Message -----  From: Santos Herndon MD  Sent: 5/28/2025   3:21 PM CDT  To: Jacqueline Howell  Subject: RE: Orders                                       Hello!    I went back to the encounter and canceled the original order. I could not figure out how to do it through the therapy plan. Did that work on your end?    If not can you take a verbal to discontinue these treatments under my name?    Thanks,  Santos Herndon MD  ----- Message -----  From: Jacqueline Howell  Sent: 5/15/2025   1:44 PM CDT  To: Santos Herndon MD; Nikkie Fisher RN; Rob  Subject: Orders                                           Good afternoon,    We have orders to schedule this patient for fluids appointments in our workqueue.     The start date on the orders is 1/12/25, but it looks like treatment has not yet started.    Given the time between then and now, just wanted to confirm these orders were still needed for the patient?    If not, could you take the Infusion Appointment Request out of the therapy plan please, so the orders will not continue to fall into the infusion workqueue?    Thank you very much for your time,    Jacqueline LAUGHLIN  Clinic Coordinator  [facility] - [address] [place] [address] Scheduling   [phone] (Option 3, Option 2)

## 2025-06-27 ENCOUNTER — OFFICE VISIT (OUTPATIENT)
Dept: FAMILY MEDICINE | Facility: CLINIC | Age: 40
End: 2025-06-27
Payer: COMMERCIAL

## 2025-06-27 VITALS
SYSTOLIC BLOOD PRESSURE: 111 MMHG | WEIGHT: 164.5 LBS | HEIGHT: 64 IN | OXYGEN SATURATION: 100 % | BODY MASS INDEX: 28.09 KG/M2 | RESPIRATION RATE: 18 BRPM | DIASTOLIC BLOOD PRESSURE: 76 MMHG | TEMPERATURE: 98.7 F | HEART RATE: 68 BPM

## 2025-06-27 DIAGNOSIS — L08.9 SKIN PUSTULE: Primary | ICD-10-CM

## 2025-06-27 DIAGNOSIS — Z11.3 SCREENING EXAMINATION FOR VENEREAL DISEASE: ICD-10-CM

## 2025-06-27 LAB
C TRACH DNA SPEC QL PROBE+SIG AMP: NEGATIVE
HIV 1+2 AB+HIV1 P24 AG SERPL QL IA: NONREACTIVE
N GONORRHOEA DNA SPEC QL NAA+PROBE: NEGATIVE
SPECIMEN TYPE: NORMAL
T PALLIDUM AB SER QL: NONREACTIVE

## 2025-06-27 PROCEDURE — 87491 CHLMYD TRACH DNA AMP PROBE: CPT

## 2025-06-27 PROCEDURE — 87591 N.GONORRHOEAE DNA AMP PROB: CPT

## 2025-06-27 PROCEDURE — 3078F DIAST BP <80 MM HG: CPT

## 2025-06-27 PROCEDURE — 87389 HIV-1 AG W/HIV-1&-2 AB AG IA: CPT

## 2025-06-27 PROCEDURE — 36415 COLL VENOUS BLD VENIPUNCTURE: CPT

## 2025-06-27 PROCEDURE — 86780 TREPONEMA PALLIDUM: CPT

## 2025-06-27 PROCEDURE — 3074F SYST BP LT 130 MM HG: CPT

## 2025-06-27 PROCEDURE — 99214 OFFICE O/P EST MOD 30 MIN: CPT | Mod: GC

## 2025-06-27 NOTE — PROGRESS NOTES
"  Assessment & Plan     Skin pustule  Screening examination for venereal disease  Patient w/ two days of raised bump just below the lower lip, concerned this may represent STI. Has been sexually active w/ one long term male partner only. No other symptoms today such as fever, chills, pelvic pain, vaginal discharge, bleeding, or dysuria. Overall very reassured by exam consistent w/ small pimple just below the L margin of the lower lip. No ulceration to suggest herpes infection. Will go ahead and order standard STI panel out of abundance of caution. Counseled pt to trial warm compresses the next few days and we will call w/ any positive infectious testing.  - Chlamydia trachomatis/Neisseria gonorrhoeae by PCR (first-voided urine)  - HIV Antigen Antibody Combo Cascade  - Treponema Abs w Reflex to RPR and Titer  - Chlamydia trachomatis/Neisseria gonorrhoeae by PCR (first-voided urine)  - HIV Antigen Antibody Combo Cascade  - Treponema Abs w Reflex to RPR and Titer    BMI  Estimated body mass index is 28.24 kg/m  as calculated from the following:    Height as of this encounter: 1.626 m (5' 4\").    Weight as of this encounter: 74.6 kg (164 lb 8 oz).     Subjective   Haleigh is a 39 year old, presenting for the following health issues:  Mass (On lip, couple days. ) and STD    HPI      Lip bump  STI testing  - Raised bump over lower lip for the last several days  - Worried this may be STI. One long term male partner. Participates in penetrative sex as receiving partner only.  - No fevers, chills, abdominal/pelvic pain, vaginal bleeding, discharge, or urinary symptoms. No rashes.     Review of Systems  Constitutional, HEENT, cardiovascular, pulmonary, gi and gu systems are negative, except as otherwise noted.        Objective    /76 (BP Location: Right arm, Patient Position: Sitting)   Pulse 68   Temp 98.7  F (37.1  C) (Oral)   Resp 18   Ht 1.626 m (5' 4\")   Wt 74.6 kg (164 lb 8 oz)   SpO2 100%   BMI 28.24 kg/m  "   Body mass index is 28.24 kg/m .  Physical Exam   GENERAL: alert and no distress  NECK: no adenopathy, no asymmetry, masses, or scars  RESP: lungs clear to auscultation - no rales, rhonchi or wheezes  CV: regular rate and rhythm, normal S1 S2, no S3 or S4, no murmur, click or rub, no peripheral edema  ABDOMEN: soft, nontender, no hepatosplenomegaly, no masses and bowel sounds normal  MS: no gross musculoskeletal defects noted, no edema  Skin: Small pustule just under the left margin of the lower lip, no surrounding erythema, no ulceration or drainage.    No results found for this or any previous visit (from the past 24 hours).        Signed Electronically by: Santana Arreola MD

## 2025-06-27 NOTE — PATIENT INSTRUCTIONS
Safer Sex: Care Instructions  Overview  Safer sex is a way to reduce your risk of getting a sexually transmitted infection (STI). It can also help prevent pregnancy.  Several products can help you practice safer sex and reduce your chance of STIs. One of the best is a condom. There are internal and external condoms. You can use a special rubber sheet (dental dam) for protection during oral sex. Disposable gloves can keep your hands from touching blood, semen, or other body fluids that can carry infections.  Remember that birth control methods such as diaphragms, IUDs, foams, and birth control pills do not stop you from getting STIs.  Follow-up care is a key part of your treatment and safety. Be sure to make and go to all appointments, and call your doctor if you are having problems. It's also a good idea to know your test results and keep a list of the medicines you take.  How can you care for yourself at home?  Think about getting vaccinated to help prevent hepatitis A, hepatitis B, and human papillomavirus (HPV). They can be spread through sex.  Use a condom every time you have sex. Use an external condom, which goes on the penis. Or use an internal condom, which goes into the vagina or anus.  Make sure you use the right size external condom. A condom that's too small can break easily. A condom that's too big can slip off during sex.  Use a new condom each time you have sex. Be careful not to poke a hole in the condom when you open the wrapper.  Don't use an internal condom and an external condom at the same time.  Never use petroleum jelly (such as Vaseline), grease, hand lotion, baby oil, or anything with oil in it. These products can make holes in the condom.  After intercourse, hold the edge of the condom as you remove it. This will help keep semen from spilling out of the condom.  Do not have sex with anyone who has symptoms of an STI, such as sores on the genitals or mouth.  Do not drink a lot of alcohol or  "use drugs before sex.  Limit your sex partners. Sex with one partner who has sex only with you can reduce your risk of getting an STI.  Don't share sex toys. But if you do share them, use a condom and clean the sex toys between each use.  Talk to any partners before you have sex. Talk about what you feel comfortable with and whether you have any boundaries with sex. And find out if your partner or partners may be at risk for any STI. Keep in mind that a person may be able to spread an STI even if they do not have symptoms. You and any partners may want to get tested for STIs.  Where can you learn more?  Go to https://www.[employer].net/patiented  Enter B608 in the search box to learn more about \"Safer Sex: Care Instructions.\"  Current as of: April 30, 2024  Content Version: 14.5 2024-2025 [employer]   Care instructions adapted under license by your healthcare professional. If you have questions about a medical condition or this instruction, always ask your healthcare professional. [employer] disclaims any warranty or liability for your use of this information.    "

## 2025-06-27 NOTE — PROGRESS NOTES
I have personally reviewed the history and examination as documented by Dr. Arreola.  I was present during key portions of the visit and agree with the assessment and plan as documented for 39 yr old female here for STI testing, pustule on lip. Precautions given. Anticipatory guidance given.     Meir Phelps MD  June 27, 2025  8:44 AM

## 2025-06-30 ENCOUNTER — RESULTS FOLLOW-UP (OUTPATIENT)
Dept: FAMILY MEDICINE | Facility: CLINIC | Age: 40
End: 2025-06-30

## 2025-07-21 ENCOUNTER — ALLIED HEALTH/NURSE VISIT (OUTPATIENT)
Dept: FAMILY MEDICINE | Facility: CLINIC | Age: 40
End: 2025-07-21
Payer: COMMERCIAL

## 2025-07-21 DIAGNOSIS — Z30.42 DEPO-PROVERA CONTRACEPTIVE STATUS: Primary | ICD-10-CM

## 2025-07-21 RX ADMIN — MEDROXYPROGESTERONE ACETATE 150 MG: 150 INJECTION, SUSPENSION INTRAMUSCULAR at 16:17

## 2025-07-21 NOTE — PROGRESS NOTES
Clinic Administered Medication Documentation      Depo Provera Documentation    Depo-Provera Standing Order inclusion/exclusion criteria reviewed.     Is this the initial or subsequent dose of Depo Provera? Subsequent dose - patient is within the acceptable window of time (11-15 weeks) for subsequent injection. Pregnancy test not indicated.    Patient meets: inclusion criteria     Is there an active order (written within the past 365 days, with administrations remaining, not ) in the chart? Yes.     Prior to injection, verified patient identity using patient's name and date of birth. Medication was administered. Please see MAR and medication order for additional information.     Vial/Syringe: Single dose vial. Was entire vial of medication used? Yes    Patient instructed to remain in clinic for 15 minutes.  NEXT INJECTION DUE: 10/6/25 - 11/3/25    Verified that the patient has refills remaining in their prescription.    Name of provider who requested the medication administration:    Name of provider on site (faculty or community preceptor) at the time of performing the medication administration:      Date of next administration: 10/6/2025-10/20/2025  Date of next office visit with provider to renew medication plan (must be seen annually)

## 2025-08-13 ENCOUNTER — OFFICE VISIT (OUTPATIENT)
Dept: FAMILY MEDICINE | Facility: CLINIC | Age: 40
End: 2025-08-13
Payer: COMMERCIAL

## 2025-08-13 VITALS
SYSTOLIC BLOOD PRESSURE: 119 MMHG | WEIGHT: 154.08 LBS | TEMPERATURE: 98.7 F | DIASTOLIC BLOOD PRESSURE: 78 MMHG | BODY MASS INDEX: 26.31 KG/M2 | HEIGHT: 64 IN | HEART RATE: 92 BPM | OXYGEN SATURATION: 97 % | RESPIRATION RATE: 18 BRPM

## 2025-08-13 DIAGNOSIS — R35.0 URINARY FREQUENCY: Primary | ICD-10-CM

## 2025-08-13 DIAGNOSIS — Z71.89 ENCOUNTER FOR HERB AND VITAMIN SUPPLEMENT MANAGEMENT: ICD-10-CM

## 2025-08-13 DIAGNOSIS — Z11.3 SCREENING EXAMINATION FOR VENEREAL DISEASE: ICD-10-CM

## 2025-08-13 LAB
ALBUMIN UR-MCNC: ABNORMAL MG/DL
APPEARANCE UR: CLEAR
BACTERIA #/AREA URNS HPF: ABNORMAL /HPF
BILIRUB UR QL STRIP: ABNORMAL
COLOR UR AUTO: YELLOW
GLUCOSE UR STRIP-MCNC: NEGATIVE MG/DL
HCG UR QL: NEGATIVE
HGB UR QL STRIP: NEGATIVE
KETONES UR STRIP-MCNC: ABNORMAL MG/DL
LEUKOCYTE ESTERASE UR QL STRIP: ABNORMAL
NITRATE UR QL: NEGATIVE
PH UR STRIP: 6 [PH] (ref 5–7)
RBC #/AREA URNS AUTO: ABNORMAL /HPF
SP GR UR STRIP: 1.02 (ref 1–1.03)
SQUAMOUS #/AREA URNS AUTO: ABNORMAL /LPF
UROBILINOGEN UR STRIP-ACNC: 1 E.U./DL
WBC #/AREA URNS AUTO: ABNORMAL /HPF

## 2025-08-13 RX ORDER — PRENATAL VIT/IRON FUM/FOLIC AC 27MG-0.8MG
1 TABLET ORAL DAILY
COMMUNITY
End: 2025-08-13

## 2025-08-13 RX ORDER — PRENATAL VIT/IRON FUM/FOLIC AC 27MG-0.8MG
1 TABLET ORAL DAILY
Qty: 90 TABLET | Refills: 1 | Status: SHIPPED | OUTPATIENT
Start: 2025-08-13

## 2025-08-14 LAB
BACTERIA UR CULT: NORMAL
C TRACH DNA SPEC QL PROBE+SIG AMP: NEGATIVE
N GONORRHOEA DNA SPEC QL NAA+PROBE: NEGATIVE
SPECIMEN TYPE: NORMAL